# Patient Record
Sex: FEMALE | Race: WHITE | NOT HISPANIC OR LATINO | Employment: OTHER | URBAN - METROPOLITAN AREA
[De-identification: names, ages, dates, MRNs, and addresses within clinical notes are randomized per-mention and may not be internally consistent; named-entity substitution may affect disease eponyms.]

---

## 2017-07-06 RX ORDER — AZELASTINE 1 MG/ML
SPRAY, METERED NASAL
COMMUNITY
Start: 2017-02-17 | End: 2019-04-29 | Stop reason: SDUPTHER

## 2017-07-06 RX ORDER — ASCORBIC ACID 500 MG
TABLET ORAL
COMMUNITY
End: 2019-05-20

## 2017-07-06 RX ORDER — ALBUTEROL SULFATE 90 UG/1
2 AEROSOL, METERED RESPIRATORY (INHALATION)
COMMUNITY
Start: 2017-03-14 | End: 2019-04-29 | Stop reason: SDUPTHER

## 2017-07-06 RX ORDER — MULTIVITAMIN
TABLET ORAL
COMMUNITY
End: 2019-05-20

## 2017-07-06 RX ORDER — ELECTROLYTES/DEXTROSE
SOLUTION, ORAL ORAL
COMMUNITY
End: 2020-01-13

## 2017-07-06 RX ORDER — MONTELUKAST SODIUM 10 MG/1
TABLET ORAL
COMMUNITY
Start: 2017-03-14 | End: 2017-07-12 | Stop reason: SDUPTHER

## 2017-07-07 ENCOUNTER — OFFICE VISIT (OUTPATIENT)
Dept: ALLERGY | Facility: CLINIC | Age: 57
End: 2017-07-07
Payer: COMMERCIAL

## 2017-07-07 VITALS — HEIGHT: 65 IN | WEIGHT: 192 LBS | BODY MASS INDEX: 31.99 KG/M2 | OXYGEN SATURATION: 98 %

## 2017-07-07 DIAGNOSIS — J98.4 SMALL AIRWAYS DISEASE: ICD-10-CM

## 2017-07-07 DIAGNOSIS — J30.1 NON-SEASONAL ALLERGIC RHINITIS DUE TO POLLEN: ICD-10-CM

## 2017-07-07 DIAGNOSIS — R05.3 CHRONIC COUGH: Primary | ICD-10-CM

## 2017-07-07 PROCEDURE — S8110 PEAK EXPIRATORY FLOW RATE (P: HCPCS | Mod: ,,, | Performed by: ALLERGY & IMMUNOLOGY

## 2017-07-07 PROCEDURE — 99213 OFFICE O/P EST LOW 20 MIN: CPT | Mod: ,,, | Performed by: ALLERGY & IMMUNOLOGY

## 2017-07-07 NOTE — PROGRESS NOTES
"Subjective:       Patient ID: Kaia Nelson is a 56 y.o. female.    Chief Complaint: Allergic Rhinitis  (3 month follow up on allergic rhinitis and chronic cough)    HPI     Pt presents from March 28th for chronic cough.   Since her last visit, she has not required her rescue inhaler.  Nocturnal cough: none  Sx: cough has resolved.  Tx: asmanex 2 puffs BID, montelukast 10 mg po daily.     Rhintiis:  Condition: controlled  Sx: none   Tx: fluticasone- 1 SEN BID  Testing: allergic     Review of Systems      General: neg unexpected weight changes, fevers, chills, night sweats, malaise  HEENT: see hpi, Neg eye pain, vision changes, ear drainage, nose bleeds, throat tightness, sores in the mouth  CV: Neg chest pain, palpitations, swelling  Resp: see hpi, neg shortness of breath, hemoptysis, cough  GI: see hpi, neg dysphagia, night abdominal pain, reflux, chronic diarrhea, chronic constipation  Derm: See Hpi, neg new rash, neg flushing  Mu/sk: Neg joint pain, joint swelling   Psych: Neg anxiety  neuro: neg chronic headaches, muscle weakness  Endo: neg heat/cold intolerance, chronic fatigue    Objective:       Vitals:    07/07/17 0936   SpO2: 98%   Weight: 87.1 kg (192 lb)   Height: 5' 5" (1.651 m)       Physical Exam      General: no acute distress, well developed well nourished   HEENT:   Head:normocephalic atraumatic  Eyes: RAYSHAWN, EOMI, Neg injection, scleral icterus, or conjunctival papillary hypertrophy.  Ears: tm clear bilaterally, normal canal  Nose: 2+ inferior turbinates pink, neg nasal polyps            Mucosa: mild dryness            Septal irritation: distal tip on septum.   OP: mucus membranes moist, - cobblestoning, - PND, neg erythema or lesions  Neck: supple, Full range of motion, neg lymphadenopathy  Chest: full respiratory excursion no abnormal chest abnormality  Resp: clear to ascultation bilaterally  CV: RRR, neg MRG, brisk capillary refill  Abdomen: BS+, non tender, non distended.  Ext:  Neg clubbing, " cyanosis, pitting edema  Skin: Neg rashes or lesions  Lymph: neg supraclavicular, axillary     Assessment:     Chronic cough - resolved    Small airway obstruction- controlled  - step down 1 puffs Twice per day.   - after 3 months if still controlled, stop asmanex.  - continue montelukast.     Allergic rhinitis-  - continue fluticasone and titrate to lowest effective dose.   - continue saline  - start vaseline on the septum to help with irritation.     Follow up in 6 months.     Diana Corado M.D.  Allergy/Immunology  [facility]   [phone] phone  [phone] fax

## 2017-07-12 RX ORDER — MONTELUKAST SODIUM 10 MG/1
TABLET ORAL
Qty: 30 TABLET | Refills: 6 | Status: SHIPPED | OUTPATIENT
Start: 2017-07-12 | End: 2019-01-09 | Stop reason: SDUPTHER

## 2018-01-09 ENCOUNTER — OFFICE VISIT (OUTPATIENT)
Dept: ALLERGY | Facility: CLINIC | Age: 58
End: 2018-01-09
Payer: COMMERCIAL

## 2018-01-09 VITALS
DIASTOLIC BLOOD PRESSURE: 78 MMHG | BODY MASS INDEX: 32.26 KG/M2 | WEIGHT: 193.63 LBS | SYSTOLIC BLOOD PRESSURE: 130 MMHG | HEIGHT: 65 IN

## 2018-01-09 DIAGNOSIS — R05.3 CHRONIC COUGH: ICD-10-CM

## 2018-01-09 DIAGNOSIS — J30.89 CHRONIC NONSEASONAL ALLERGIC RHINITIS DUE TO POLLEN: ICD-10-CM

## 2018-01-09 DIAGNOSIS — J98.4 SMALL AIRWAYS DISEASE: Primary | ICD-10-CM

## 2018-01-09 PROCEDURE — 99214 OFFICE O/P EST MOD 30 MIN: CPT | Mod: ,,, | Performed by: ALLERGY & IMMUNOLOGY

## 2018-01-09 NOTE — PATIENT INSTRUCTIONS
Nose:  When having increased symptoms:    Increase flonase to 2 sprays twice per day for 2 weeks.   Then slowly go back to to your current dose.     Azelastine 1 spray twice per day- may use as needed. Max dose is 2 sprays twice per day.     May take montelukast for bronchospasm and congestion in the sinuses.     The inhaler is for inflammation in the lower airways or the lungs. Will take about 2 weeks before fully in effect.     May wean off when better at any time.     December around Christmas, start flonase 1 spray twice per day- if symptoms are getting worse increase. Start azelastine 1 spray twice per day as these two both work better together.  If the lungs are affected start and stop after 3 months.   Increase saline.   Montelukast may take about a week before fully effective. May stop this at any time.

## 2018-01-09 NOTE — PROGRESS NOTES
"Subjective:       Patient ID: Kaia Nelson is a 57 y.o. female.    Chief Complaint: Follow-up (F/U for cough - has gotten better since last visit )    HPI     Pt presents from July of 2017.  Condition: improved  Sx: fluid in the ears.   Tx: she was stepped down from asmanex to 1 puff BID.   We discussed completely stopping asmanex if she was controlled.   She was able to wean herself off the asmanex without issue.   She is also off her montelukast.   She has had some cough in the recent past but now resolved.   She is still taking flonase- 1 sen once daily.  Saline: once per day.   Off azelastine.         Review of Systems      General: neg unexpected weight changes, fevers, chills, night sweats, malaise  HEENT: see hpi, Neg eye pain, vision changes, ear drainage, nose bleeds, throat tightness, sores in the mouth  CV: Neg chest pain, palpitations, swelling  Resp: see hpi, neg shortness of breath, hemoptysis, cough  GI: see hpi, neg dysphagia, night abdominal pain, reflux, chronic diarrhea, chronic constipation  Derm: See Hpi, neg new rash, neg flushing  Mu/sk: Neg joint pain, joint swelling   Psych: Neg anxiety  neuro: neg chronic headaches, muscle weakness  Endo: neg heat/cold intolerance, chronic fatigue    Objective:       Vitals:    01/09/18 0845   BP: 130/78   Weight: 87.8 kg (193 lb 9.6 oz)   Height: 5' 5" (1.651 m)   PF: 390 L/min       Physical Exam      General: no acute distress, well developed well nourished   HEENT:   Head:normocephalic atraumatic  Eyes: RAYSHAWN, EOMI, Neg injection, scleral icterus, or conjunctival papillary hypertrophy.  Ears: tm clear bilaterally, normal canal  Nose: 2-3+ inferior turbinates pink, neg nasal polyps            Mucosa: mild dryness            Septal irritation: left and infr turb  OP: mucus membranes moist, - cobblestoning, - PND, neg erythema or lesions  Neck: supple, Full range of motion, neg lymphadenopathy  Chest: full respiratory excursion no abnormal chest " abnormality  Resp: clear to ascultation bilaterally  CV: RRR, neg MRG, brisk capillary refill  Abdomen: BS+, non tender, non distended  Ext:  Neg clubbing, cyanosis, pitting edema  Skin: Neg rashes or lesions  Lymph: neg supraclavicular, axillary     Assessment:       1. Small airways disease    2. Chronic nonseasonal allergic rhinitis due to pollen    3. Chronic cough        Plan:       Small airways disease    Chronic nonseasonal allergic rhinitis due to pollen    Chronic cough      Pt and I discuss face to face > 35 mins > 50% about prophylactic regimen for January in future regimens in the event of increasing symptoms.        Follow up in 12 months.    Pt to continue saline, for irritation stop flonase for 3 days then resume.   Plan given to patient written. See AVS.             Diana Corado M.D.  Allergy/Immunology  [facility]   [phone] phone  [phone] fax

## 2019-01-09 ENCOUNTER — OFFICE VISIT (OUTPATIENT)
Dept: ALLERGY | Facility: CLINIC | Age: 59
End: 2019-01-09
Payer: COMMERCIAL

## 2019-01-09 VITALS — BODY MASS INDEX: 33.32 KG/M2 | HEART RATE: 82 BPM | OXYGEN SATURATION: 97 % | HEIGHT: 65 IN | WEIGHT: 200 LBS

## 2019-01-09 DIAGNOSIS — J98.4 SMALL AIRWAYS DISEASE: ICD-10-CM

## 2019-01-09 DIAGNOSIS — J30.1 NON-SEASONAL ALLERGIC RHINITIS DUE TO POLLEN: Primary | ICD-10-CM

## 2019-01-09 PROCEDURE — 99213 OFFICE O/P EST LOW 20 MIN: CPT | Mod: ,,, | Performed by: ALLERGY & IMMUNOLOGY

## 2019-01-09 PROCEDURE — 3008F PR BODY MASS INDEX (BMI) DOCUMENTED: ICD-10-PCS | Mod: ,,, | Performed by: ALLERGY & IMMUNOLOGY

## 2019-01-09 PROCEDURE — 3008F BODY MASS INDEX DOCD: CPT | Mod: ,,, | Performed by: ALLERGY & IMMUNOLOGY

## 2019-01-09 PROCEDURE — 99213 PR OFFICE/OUTPT VISIT, EST, LEVL III, 20-29 MIN: ICD-10-PCS | Mod: ,,, | Performed by: ALLERGY & IMMUNOLOGY

## 2019-01-09 RX ORDER — MONTELUKAST SODIUM 10 MG/1
10 TABLET ORAL DAILY
Qty: 30 TABLET | Refills: 6 | Status: SHIPPED | OUTPATIENT
Start: 2019-01-09 | End: 2019-08-04 | Stop reason: SDUPTHER

## 2019-01-09 RX ORDER — FLUTICASONE PROPIONATE 50 MCG
2 SPRAY, SUSPENSION (ML) NASAL 2 TIMES DAILY
COMMUNITY
End: 2021-09-28 | Stop reason: SDUPTHER

## 2019-01-09 NOTE — PROGRESS NOTES
"Subjective:       Patient ID: Kaia Nelson is a 58 y.o. female.    Chief Complaint: Cough    HPI     Pt presents for cough  Condition: improved  Sx: left ear and PND.    Tx: able to be off asmanex. Fluticasone 2 sen bid. Stopped montelukast. - using as needed.    We discussed completely stopping asmanex if she was controlled.   She was able to wean herself off the asmanex without issue.   She is also off her montelukast.   She has had some cough in the recent past but now resolved.   Saline: once per day.   Off azelastine.     No allergy episodes.         Review of Systems      General: neg unexpected weight changes, fevers, chills, night sweats, malaise  HEENT: see hpi, Neg eye pain, vision changes, ear drainage, nose bleeds, throat tightness, sores in the mouth  CV: Neg chest pain, palpitations, swelling  Resp: see hpi, neg shortness of breath, hemoptysis, cough  GI: see hpi, neg dysphagia, night abdominal pain, reflux, chronic diarrhea, chronic constipation  Derm: See Hpi, neg new rash, neg flushing  Mu/sk: Neg joint pain, joint swelling   Psych: Neg anxiety  neuro: neg chronic headaches, muscle weakness  Endo: neg heat/cold intolerance, chronic fatigue    Objective:       Vitals:    01/09/19 0837   Pulse: 82   SpO2: 97%   Weight: 90.7 kg (200 lb)   Height: 5' 5" (1.651 m)       Physical Exam      General: no acute distress, well developed well nourished   HEENT:   Head:normocephalic atraumatic  Eyes: RAYSHAWN, EOMI, Neg injection, scleral icterus, or conjunctival papillary hypertrophy.  Ears: tm clear bilaterally, normal canal  Nose: 2+ inferior turbinates pink, neg nasal polyps            Mucosa: mild dryness            Septal irritation: neg   OP: mucus membranes moist, - cobblestoning, - PND, neg erythema or lesions  Neck: supple, Full range of motion, neg lymphadenopathy  Chest: full respiratory excursion no abnormal chest abnormality  Resp: clear to ascultation bilaterally  CV: RRR, neg MRG, brisk capillary " refill  Abdomen: BS+, non tender, non distended  Ext:  Neg clubbing, cyanosis, pitting edema  Skin: Neg rashes or lesions  Lymph: neg supraclavicular, axillary     Assessment:       1. Non-seasonal allergic rhinitis due to pollen    2. Small airways disease        Plan:       Non-seasonal allergic rhinitis due to pollen  -     montelukast (SINGULAIR) 10 mg tablet; Take 1 tablet (10 mg total) by mouth once daily.  Dispense: 30 tablet; Refill: 6    Small airways disease        Improved   Continue fluticasone at higher dose when sx then back to baseline.   Continue montelukast po qday 10 mg.     F/u 12 months. Sooner if needed.                 Diana Corado M.D.  Allergy/Immunology  [facility]   [phone] phone  [phone] fax

## 2019-04-29 ENCOUNTER — OFFICE VISIT (OUTPATIENT)
Dept: ALLERGY | Facility: CLINIC | Age: 59
End: 2019-04-29
Payer: COMMERCIAL

## 2019-04-29 ENCOUNTER — TELEPHONE (OUTPATIENT)
Dept: ALLERGY | Facility: CLINIC | Age: 59
End: 2019-04-29

## 2019-04-29 VITALS
DIASTOLIC BLOOD PRESSURE: 80 MMHG | BODY MASS INDEX: 33.32 KG/M2 | WEIGHT: 200 LBS | HEIGHT: 65 IN | SYSTOLIC BLOOD PRESSURE: 140 MMHG

## 2019-04-29 DIAGNOSIS — J30.1 NON-SEASONAL ALLERGIC RHINITIS DUE TO POLLEN: ICD-10-CM

## 2019-04-29 DIAGNOSIS — J98.4 SMALL AIRWAYS DISEASE: Primary | ICD-10-CM

## 2019-04-29 PROCEDURE — 99214 OFFICE O/P EST MOD 30 MIN: CPT | Mod: ,,, | Performed by: ALLERGY & IMMUNOLOGY

## 2019-04-29 PROCEDURE — 3008F PR BODY MASS INDEX (BMI) DOCUMENTED: ICD-10-PCS | Mod: ,,, | Performed by: ALLERGY & IMMUNOLOGY

## 2019-04-29 PROCEDURE — 99214 PR OFFICE/OUTPT VISIT, EST, LEVL IV, 30-39 MIN: ICD-10-PCS | Mod: ,,, | Performed by: ALLERGY & IMMUNOLOGY

## 2019-04-29 PROCEDURE — 3008F BODY MASS INDEX DOCD: CPT | Mod: ,,, | Performed by: ALLERGY & IMMUNOLOGY

## 2019-04-29 RX ORDER — ALBUTEROL SULFATE 90 UG/1
2 AEROSOL, METERED RESPIRATORY (INHALATION) EVERY 6 HOURS PRN
Qty: 18 G | Refills: 3 | Status: SHIPPED | OUTPATIENT
Start: 2019-04-29 | End: 2021-09-28 | Stop reason: SDUPTHER

## 2019-04-29 RX ORDER — PREDNISONE 20 MG/1
40 TABLET ORAL DAILY
Qty: 6 TABLET | Refills: 0 | Status: SHIPPED | OUTPATIENT
Start: 2019-04-29 | End: 2019-05-02

## 2019-04-29 RX ORDER — AZELASTINE 1 MG/ML
1 SPRAY, METERED NASAL 2 TIMES DAILY
Qty: 30 ML | Refills: 3 | Status: SHIPPED | OUTPATIENT
Start: 2019-04-29 | End: 2019-08-15 | Stop reason: SDUPTHER

## 2019-04-29 NOTE — PROGRESS NOTES
"Subjective:       Patient ID: Kaia Nelson is a 58 y.o. female.    Chief Complaint: Cough (started last wednesday)    HPI     Pt presents for cough  Condition: exacerbated last Wednesday   Sx: cough dry, congestion   Tx: asmanex 2 bid on Saturday through today.   Rescue inhaler: hasn't used more than Saturday. Didn't help with cough but helped with heaviness.   Fluticasone 2 sen bid. Started montelukast. - using as needed.    Saline: once per day at night.   Off- azelastine       Review of Systems      General: neg unexpected weight changes, fevers, chills, night sweats, malaise  HEENT: see hpi, Neg eye pain, vision changes, ear drainage, nose bleeds, throat tightness, sores in the mouth  CV: Neg chest pain, palpitations, swelling  Resp: see hpi, neg shortness of breath, hemoptysis, cough  GI: see hpi, neg dysphagia, night abdominal pain, reflux, chronic diarrhea, chronic constipation  Derm: See Hpi, neg new rash, neg flushing  Mu/sk: Neg joint pain, joint swelling   Psych: Neg anxiety  neuro: neg chronic headaches, muscle weakness  Endo: neg heat/cold intolerance, chronic fatigue     Objective:       Vitals:    04/29/19 1614   BP: (!) 140/80   Weight: 90.7 kg (200 lb)   Height: 5' 5" (1.651 m)   PF: 300 L/min       Physical Exam      General: no acute distress, well developed well nourished   HEENT:   Head:normocephalic atraumatic  Eyes: RAYSHAWN, EOMI, Neg injection, scleral icterus, or conjunctival papillary hypertrophy.  Ears: tm clear bilaterally, normal canal  Nose: 2+ inferior turbinates pink, neg nasal polyps            Mucosa: moist             Septal irritation: neg   OP: mucus membranes moist, - cobblestoning, - PND, neg erythema or lesions  Neck: supple, Full range of motion, neg lymphadenopathy  Chest: full respiratory excursion no abnormal chest abnormality  Resp: course in all lung fields   CV: RRR, neg MRG, brisk capillary refill  Abdomen: BS+, non tender, non distended  Ext:  Neg clubbing, cyanosis, " pitting edema  Skin: Neg rashes or lesions  Lymph: neg supraclavicular, axillary     Assessment:       1. Small airways disease    2. Non-seasonal allergic rhinitis due to pollen        Plan:       Small airways disease  -     predniSONE (DELTASONE) 20 MG tablet; Take 2 tablets (40 mg total) by mouth once daily. for 3 days  Dispense: 6 tablet; Refill: 0  -     mometasone (ASMANEX HFA) 200 mcg/actuation HFAA; Inhale 2 puffs into the lungs 2 (two) times daily.  Dispense: 13 g; Refill: 3  -     albuterol (PROAIR HFA) 90 mcg/actuation inhaler; Inhale 2 puffs into the lungs every 6 (six) hours as needed for Wheezing or Shortness of Breath (COUGH).  Dispense: 18 g; Refill: 3    Non-seasonal allergic rhinitis due to pollen  -     azelastine (ASTELIN) 137 mcg (0.1 %) nasal spray; 1 spray (137 mcg total) by Nasal route 2 (two) times daily.  Dispense: 30 mL; Refill: 3          Small airway exacerbated    F/u 4-6 weeks/if better 3 months. Sooner if needed.                 Diana Corado M.D.  Allergy/Immunology  [facility]   [phone] phone  [phone] fax

## 2019-04-29 NOTE — TELEPHONE ENCOUNTER
----- Message from Vanessa Colón sent at 4/29/2019  9:18 AM CDT -----  Contact: patient  Patient is asking for predisone for her cough. Says what she is taking is not woring that well.   Patient uses [facility] on [address]

## 2019-04-29 NOTE — PATIENT INSTRUCTIONS
Nose:  Saline increase to twice per day   Continue fluticasone 2 sprays per nare twice per day for the next two weeks   Find the azelastine and use as needed up to 4 times per day   Continue montelukast 1 pill once per day     Take allegra as needed for any symptoms.     Lung:  Pro air 4-6 puffs every 6 hours for the next 3 days.   Then wean every 3 days.     asmanex 4 puffs twice per day for the next two weeks     Prednisone 40 mg x 3 days. Take in the morning with food.     Follow up if needed in 4 weeks if better 3 months.     Once better use the asmanex 2 puffs twice per day for three months then wean back off.

## 2019-04-29 NOTE — TELEPHONE ENCOUNTER
Discussed with Dr. Corado and advised for patient to have a visit.  Appt booked for today at 3:40

## 2019-04-30 ENCOUNTER — PATIENT MESSAGE (OUTPATIENT)
Dept: ALLERGY | Facility: CLINIC | Age: 59
End: 2019-04-30

## 2019-05-06 DIAGNOSIS — J98.4 SMALL AIRWAYS DISEASE: Primary | ICD-10-CM

## 2019-05-06 RX ORDER — FLUTICASONE PROPIONATE 220 UG/1
2 AEROSOL, METERED RESPIRATORY (INHALATION) 2 TIMES DAILY
Qty: 12 G | Refills: 2 | Status: SHIPPED | OUTPATIENT
Start: 2019-05-06 | End: 2019-06-18

## 2019-05-06 NOTE — TELEPHONE ENCOUNTER
Insurance is refusing to approve Asmanex due to no failures of Flovent, Qvar, or Arnuity.  I spoke with the patient and she does not feel like she can fully take a deep enough breath to pull the qvar into her lungs.  Per Dr. Corado: Flovent 220 2 puffs bid sent to [facility].  Patient states understanding.

## 2019-05-20 ENCOUNTER — OFFICE VISIT (OUTPATIENT)
Dept: ALLERGY | Facility: CLINIC | Age: 59
End: 2019-05-20
Payer: COMMERCIAL

## 2019-05-20 VITALS
WEIGHT: 197 LBS | TEMPERATURE: 99 F | BODY MASS INDEX: 32.78 KG/M2 | RESPIRATION RATE: 18 BRPM | HEART RATE: 81 BPM | SYSTOLIC BLOOD PRESSURE: 128 MMHG | DIASTOLIC BLOOD PRESSURE: 72 MMHG | OXYGEN SATURATION: 98 %

## 2019-05-20 DIAGNOSIS — J98.4 SMALL AIRWAYS DISEASE: ICD-10-CM

## 2019-05-20 DIAGNOSIS — R05.3 CHRONIC COUGH: Primary | ICD-10-CM

## 2019-05-20 PROCEDURE — 99214 OFFICE O/P EST MOD 30 MIN: CPT | Mod: ,,, | Performed by: ALLERGY & IMMUNOLOGY

## 2019-05-20 PROCEDURE — 3008F PR BODY MASS INDEX (BMI) DOCUMENTED: ICD-10-PCS | Mod: ,,, | Performed by: ALLERGY & IMMUNOLOGY

## 2019-05-20 PROCEDURE — 99214 PR OFFICE/OUTPT VISIT, EST, LEVL IV, 30-39 MIN: ICD-10-PCS | Mod: ,,, | Performed by: ALLERGY & IMMUNOLOGY

## 2019-05-20 PROCEDURE — 3008F BODY MASS INDEX DOCD: CPT | Mod: ,,, | Performed by: ALLERGY & IMMUNOLOGY

## 2019-05-20 RX ORDER — AZITHROMYCIN 250 MG/1
TABLET, FILM COATED ORAL
Qty: 11 TABLET | Refills: 0 | Status: SHIPPED | OUTPATIENT
Start: 2019-05-20 | End: 2020-01-09

## 2019-05-20 RX ORDER — CHOLECALCIFEROL (VITAMIN D3) 25 MCG
2000 TABLET ORAL DAILY
COMMUNITY

## 2019-05-20 RX ORDER — MINOXIDIL 10 MG/1
2.5 TABLET ORAL DAILY
COMMUNITY
End: 2023-04-30

## 2019-05-20 RX ORDER — FINASTERIDE 1 MG/1
1 TABLET, FILM COATED ORAL DAILY
COMMUNITY
End: 2021-04-07

## 2019-05-20 NOTE — PROGRESS NOTES
Subjective:       Patient ID: Kaia Nelson is a 58 y.o. female.    Chief Complaint: Cough (worse than before)    HPI     Pt presents for cough  Condition: exacerbated- worse after getting better started on Friday- 4 days ago.   1 week after ICS BID getting better.   After prednisone, no change until a week later when on the inhaler.   She had mood changes on the prednisone.   Sx: cough dry, congestion   Tx: asmanex 2 bid was changed to flovent 220 2 puffs BID.   Rescue inhaler: has not helped,.   Fluticasone 2 sen bid. Started montelukast  Saline: once per day at night.   1 sen  BID- azelastine         General: neg unexpected weight changes, fevers, chills, night sweats, malaise  HEENT: see hpi, Neg eye pain, vision changes, ear drainage, nose bleeds, throat tightness, sores in the mouth  CV: Neg chest pain, palpitations, swelling  Resp: see hpi, neg shortness of breath, hemoptysis, cough  GI: see hpi, neg dysphagia, night abdominal pain, reflux, chronic diarrhea, chronic constipation  Derm: See Hpi, neg new rash, neg flushing  Mu/sk: Neg joint pain, joint swelling   Psych: Neg anxiety  neuro: neg chronic headaches, muscle weakness  Endo: neg heat/cold intolerance, chronic fatigue     Objective:       Vitals:    05/20/19 1129   BP: 128/72   Pulse: 81   Resp: 18   Temp: 98.9 °F (37.2 °C)   TempSrc: Oral   SpO2: 98%   Weight: 89.4 kg (197 lb)   PF: 300 L/min       Physical Exam      General: no acute distress, well developed well nourished   HEENT:   Head:normocephalic atraumatic  Eyes: RAYSHAWN, EOMI, Neg injection, scleral icterus, or conjunctival papillary hypertrophy.  Ears: tm clear bilaterally, normal canal  Nose: 2+ inferior turbinates pink, neg nasal polyps            Mucosa: dry on the left             Septal irritation: left    OP: mucus membranes moist, - cobblestoning, - PND, neg erythema or lesions  Neck: supple, Full range of motion, neg lymphadenopathy  Chest: full respiratory excursion no abnormal chest  abnormality  Resp: course in all lung fields   CV: RRR, neg MRG, brisk capillary refill  Abdomen: BS+, non tender, non distended  Ext:  Neg clubbing, cyanosis, pitting edema  Skin: Neg rashes or lesions  Lymph: neg supraclavicular, axillary     Assessment:       1. Chronic cough    2. Small airways disease        Plan:       Chronic cough  -     azithromycin (Z-ANA) 250 MG tablet; Day 1 take 2 pills then  Dispense: 11 tablet; Refill: 0  -     X-Ray Chest PA And Lateral; Future; Expected date: 05/20/2019  -     (Magic mouthwash) 1:1:1 Benadryl 12.5mg/5ml liq, aluminum & magnesium hydroxide-simehticone (Maalox), lidocaine viscous 2%; Swish and spit 3 mLs every 4 (four) hours as needed (cough). for mouth sores  Dispense: 300 mL; Refill: 3    Small airways disease  -     azithromycin (Z-ANA) 250 MG tablet; Day 1 take 2 pills then  Dispense: 11 tablet; Refill: 0  -     X-Ray Chest PA And Lateral; Future; Expected date: 05/20/2019          Small airway exacerbated  Worry about double illness and atypical.   10 day course azithro.   Not improved on steroids.   Continue flovent 2 puffs bid  Start magic mouthwash prn q 3-6 hours.   Chest radiograph.     Sample of symbicort 80 2 puff BID x1 week, then 1 BID    F/u 4-6 weeks/if better 3 months. Sooner if needed.                 Diana Corado M.D.  Allergy/Immunology  [facility]   [phone] phone  [phone] fax

## 2019-05-20 NOTE — PATIENT INSTRUCTIONS
Cough:  symbicort 80 1 puff twice per day, or if you want 2 puffs twice per day x 1 week then 1 puff twice per day.     Magic mouth wash 3-5 mL as needed every 3-6 hours     Chest xray to evaluate for atypical infection.     Continue flovent 2 puffs twice per day    Continue montelukast 1 pill once per day     Continue to wean albuterol.     10 day course of azithromycin   2 pills day 1 then 1 pill once per day , day 2-10.      600 mg motrin as needed with food every 6 hours.

## 2019-05-22 ENCOUNTER — PATIENT MESSAGE (OUTPATIENT)
Dept: ALLERGY | Facility: CLINIC | Age: 59
End: 2019-05-22

## 2019-06-05 ENCOUNTER — OFFICE VISIT (OUTPATIENT)
Dept: ALLERGY | Facility: CLINIC | Age: 59
End: 2019-06-05
Payer: COMMERCIAL

## 2019-06-05 ENCOUNTER — TELEPHONE (OUTPATIENT)
Dept: ALLERGY | Facility: CLINIC | Age: 59
End: 2019-06-05

## 2019-06-05 VITALS
BODY MASS INDEX: 32.15 KG/M2 | SYSTOLIC BLOOD PRESSURE: 118 MMHG | TEMPERATURE: 99 F | DIASTOLIC BLOOD PRESSURE: 78 MMHG | HEIGHT: 65 IN | WEIGHT: 193 LBS

## 2019-06-05 DIAGNOSIS — J30.1 NON-SEASONAL ALLERGIC RHINITIS DUE TO POLLEN: ICD-10-CM

## 2019-06-05 DIAGNOSIS — R68.83 CHILLS WITHOUT FEVER: ICD-10-CM

## 2019-06-05 DIAGNOSIS — B35.4 TINEA CORPORIS: ICD-10-CM

## 2019-06-05 DIAGNOSIS — R53.82 CHRONIC FATIGUE: ICD-10-CM

## 2019-06-05 DIAGNOSIS — J98.4 SMALL AIRWAYS DISEASE: Primary | ICD-10-CM

## 2019-06-05 DIAGNOSIS — J32.0 LEFT MAXILLARY SINUSITIS: ICD-10-CM

## 2019-06-05 DIAGNOSIS — R05.3 CHRONIC COUGH: ICD-10-CM

## 2019-06-05 PROCEDURE — 3008F PR BODY MASS INDEX (BMI) DOCUMENTED: ICD-10-PCS | Mod: ,,, | Performed by: ALLERGY & IMMUNOLOGY

## 2019-06-05 PROCEDURE — 99214 PR OFFICE/OUTPT VISIT, EST, LEVL IV, 30-39 MIN: ICD-10-PCS | Mod: ,,, | Performed by: ALLERGY & IMMUNOLOGY

## 2019-06-05 PROCEDURE — 99214 OFFICE O/P EST MOD 30 MIN: CPT | Mod: ,,, | Performed by: ALLERGY & IMMUNOLOGY

## 2019-06-05 PROCEDURE — 3008F BODY MASS INDEX DOCD: CPT | Mod: ,,, | Performed by: ALLERGY & IMMUNOLOGY

## 2019-06-05 RX ORDER — OSELTAMIVIR PHOSPHATE 75 MG/1
75 CAPSULE ORAL 2 TIMES DAILY
Qty: 10 CAPSULE | Refills: 0 | Status: SHIPPED | OUTPATIENT
Start: 2019-06-05 | End: 2019-06-10

## 2019-06-05 RX ORDER — CLOTRIMAZOLE 1 %
CREAM (GRAM) TOPICAL
Qty: 45 G | Refills: 3 | Status: SHIPPED | OUTPATIENT
Start: 2019-06-05 | End: 2020-02-24

## 2019-06-05 RX ORDER — AMOXICILLIN AND CLAVULANATE POTASSIUM 875; 125 MG/1; MG/1
1 TABLET, FILM COATED ORAL EVERY 12 HOURS
Qty: 28 TABLET | Refills: 0 | Status: SHIPPED | OUTPATIENT
Start: 2019-06-05 | End: 2019-06-19

## 2019-06-05 RX ORDER — PREDNISONE 10 MG/1
10 TABLET ORAL DAILY
Qty: 5 TABLET | Refills: 0 | Status: SHIPPED | OUTPATIENT
Start: 2019-06-05 | End: 2019-06-10

## 2019-06-05 NOTE — TELEPHONE ENCOUNTER
called stating that Mrs. Marti isn't feeling better besides a slight improvement in her cough. She completed her Z-Genaro but is still having severe sinus pressure, low grade fever, and fatigue. Mr. Hammond states that he required an urgent care visit where they diagnosed him with walking pneumonia and took antibiotics. He is concerned that she may need additional antibiotics. Please advise. Thanks.

## 2019-06-05 NOTE — PROGRESS NOTES
"Subjective:       Patient ID: Kaia Nelson is a 58 y.o. female.    Chief Complaint: Sinus Problem (pain in left side of face and neck)      Pt presents for cough  Condition: exacerbated- worse after getting better started on Friday- 4 days ago.   1 week after ICS BID getting better.   After prednisone, no change until a week later when on the inhaler.   She had mood changes on the prednisone.   She was given azithromycin 10 day course and chest xray showed streakiness and concern for atypical mycoplasma and was tx x 10 days.   Now the cough may be slightly better but she is still symptomatic.   Sx: cough dry, congestion   Tx: asmanex 2 bid was changed to flovent 220 2 puffs BID. symbicort sample 80 2 puffs bid given.   Rescue inhaler: has not helped per report.   Fluticasone 2 sen bid, montelukast 10 mg po qday   Started magic mouth wash   Saline: once per day at night.   1 sen  BID- azelastine         General: neg unexpected weight changes, fevers, chills, night sweats, malaise  HEENT: see hpi, Neg eye pain, vision changes, ear drainage, nose bleeds, throat tightness, sores in the mouth  CV: Neg chest pain, palpitations, swelling  Resp: see hpi, neg shortness of breath, hemoptysis, cough  GI: see hpi, neg dysphagia, night abdominal pain, reflux, chronic diarrhea, chronic constipation  Derm: See Hpi, neg new rash, neg flushing  Mu/sk: Neg joint pain, joint swelling   Psych: Neg anxiety  neuro: neg chronic headaches, muscle weakness  Endo: neg heat/cold intolerance, chronic fatigue     Objective:       Vitals:    06/05/19 1419   BP: 118/78   Temp: 98.6 °F (37 °C)   Weight: 87.5 kg (193 lb)   Height: 5' 5" (1.651 m)   PF: 450 L/min       Physical Exam      General: no acute distress, well developed well nourished , pallor of the face    HEENT:   Head:normocephalic atraumatic, left sided facial swelling and tenderness in maxillary area with neck swelling.   Eyes: RAYSHAWN, EOMI, Neg injection, scleral icterus, or " conjunctival papillary hypertrophy.  Neck: supple, Full range of motion, neg lymphadenopathy, left swelling.   Skin: Neg rashes or lesions  Lymph: neg supraclavicular, axillary     Assessment:       1. Small airways disease    2. Chronic cough    3. Non-seasonal allergic rhinitis due to pollen    4. Left maxillary sinusitis    5. Chills without fever    6. Chronic fatigue    7. Tinea corporis        Plan:       Small airways disease    Chronic cough    Non-seasonal allergic rhinitis due to pollen    Left maxillary sinusitis  -     amoxicillin-clavulanate 875-125mg (AUGMENTIN) 875-125 mg per tablet; Take 1 tablet by mouth every 12 (twelve) hours. Take with food. for 14 days  Dispense: 28 tablet; Refill: 0  -     predniSONE (DELTASONE) 10 MG tablet; Take 1 tablet (10 mg total) by mouth once daily. for 5 days  Dispense: 5 tablet; Refill: 0    Chills without fever  -     oseltamivir (TAMIFLU) 75 MG capsule; Take 1 capsule (75 mg total) by mouth 2 (two) times daily. for 5 days  Dispense: 10 capsule; Refill: 0    Chronic fatigue  -     oseltamivir (TAMIFLU) 75 MG capsule; Take 1 capsule (75 mg total) by mouth 2 (two) times daily. for 5 days  Dispense: 10 capsule; Refill: 0    Tinea corporis  -     clotrimazole (LOTRIMIN) 1 % cream; Apply topically three times daily with food. Until gone. for 3 doses  Dispense: 45 g; Refill: 3          Small airway exacerbated  Worried about double illness and atypical infection and s/p 10 day course of azithro.   Not improved on steroids.   Continue flovent 2 puffs bid, symbicort 80 2 puffs bid did not help.   magic mouthwash prn q 3-6 hours.   Chest radiograph showed streakiness and tx for atypical.     Face is significantly swollen on the left- augmentin 875 mg BID, due to chills and fever feeling and how pale she is today will treat for flu like symptoms.         F/u 4-6 weeks/if better 3 months. Sooner if needed.                 Diana Corado M.D.  Allergy/Immunology  [place]   [phone] phone  [phone] fax

## 2019-06-05 NOTE — PATIENT INSTRUCTIONS
Nose:  Continue as normal    Prednisone 10 mg take with food 1 pill in the morning only for 5 days. This is to help with energy and inflammation.     Augumentin 875 mg 1 pill twice per day take with food, may cause diarrhea. Take until whole bottle is done.    Because of your symptoms, I am starting tamiflu 1 pill twice per day.     Only for 5 days.       If things get worse call. We can order imaging.     Continue montelukast    Albuterol as needed for cough wheeze shortness of breath.   Finish the symbicort   Once that is done, then we can just use flovent.     Stay home from work :o) please :o)

## 2019-06-18 DIAGNOSIS — R05.3 CHRONIC COUGH: Primary | ICD-10-CM

## 2019-08-01 RX ORDER — FLUTICASONE PROPIONATE 220 UG/1
AEROSOL, METERED RESPIRATORY (INHALATION)
Qty: 36 G | Refills: 1 | Status: SHIPPED | OUTPATIENT
Start: 2019-08-01 | End: 2019-12-30 | Stop reason: SDUPTHER

## 2019-08-04 DIAGNOSIS — J30.1 NON-SEASONAL ALLERGIC RHINITIS DUE TO POLLEN: ICD-10-CM

## 2019-08-05 RX ORDER — MONTELUKAST SODIUM 10 MG/1
TABLET ORAL
Qty: 90 TABLET | Refills: 2 | Status: SHIPPED | OUTPATIENT
Start: 2019-08-05 | End: 2020-04-21

## 2019-08-15 DIAGNOSIS — J30.1 NON-SEASONAL ALLERGIC RHINITIS DUE TO POLLEN: ICD-10-CM

## 2019-08-15 RX ORDER — AZELASTINE 1 MG/ML
SPRAY, METERED NASAL
Qty: 90 ML | Refills: 3 | Status: SHIPPED | OUTPATIENT
Start: 2019-08-15 | End: 2021-06-24

## 2019-12-30 ENCOUNTER — OFFICE VISIT (OUTPATIENT)
Dept: ALLERGY | Facility: CLINIC | Age: 59
End: 2019-12-30
Payer: COMMERCIAL

## 2019-12-30 VITALS
WEIGHT: 201 LBS | DIASTOLIC BLOOD PRESSURE: 84 MMHG | HEART RATE: 88 BPM | SYSTOLIC BLOOD PRESSURE: 130 MMHG | BODY MASS INDEX: 32.3 KG/M2 | HEIGHT: 66 IN | OXYGEN SATURATION: 99 %

## 2019-12-30 DIAGNOSIS — B37.0 THRUSH: ICD-10-CM

## 2019-12-30 DIAGNOSIS — J98.4 SMALL AIRWAYS DISEASE: Primary | ICD-10-CM

## 2019-12-30 PROCEDURE — 99214 OFFICE O/P EST MOD 30 MIN: CPT | Mod: S$GLB,,, | Performed by: ALLERGY & IMMUNOLOGY

## 2019-12-30 PROCEDURE — 3008F PR BODY MASS INDEX (BMI) DOCUMENTED: ICD-10-PCS | Mod: S$GLB,,, | Performed by: ALLERGY & IMMUNOLOGY

## 2019-12-30 PROCEDURE — 99214 PR OFFICE/OUTPT VISIT, EST, LEVL IV, 30-39 MIN: ICD-10-PCS | Mod: S$GLB,,, | Performed by: ALLERGY & IMMUNOLOGY

## 2019-12-30 PROCEDURE — 3008F BODY MASS INDEX DOCD: CPT | Mod: S$GLB,,, | Performed by: ALLERGY & IMMUNOLOGY

## 2019-12-30 RX ORDER — FLUTICASONE PROPIONATE 220 UG/1
AEROSOL, METERED RESPIRATORY (INHALATION)
Qty: 36 G | Refills: 2 | Status: SHIPPED | OUTPATIENT
Start: 2019-12-30 | End: 2021-03-08

## 2019-12-30 RX ORDER — NYSTATIN 100000 [USP'U]/ML
6 SUSPENSION ORAL 4 TIMES DAILY
Qty: 240 ML | Refills: 1 | Status: SHIPPED | OUTPATIENT
Start: 2019-12-30 | End: 2020-01-09

## 2019-12-30 NOTE — PATIENT INSTRUCTIONS
Nose:  Continue as current     Lung:   Increase flovent to 4 puffs twice per day x 2 weeks then back down to twice per day.     Use with aerochamber. Wash aerochamber after use.     Use nystatin swish and spit or swallow four times per day x 10 days to get rid of potential thrush in the back.     Will try to get spiriva approved 2 puffs daily. May use as needed.     Follow up in 3 months, sooner if needed.

## 2019-12-30 NOTE — PROGRESS NOTES
"Subjective:       Patient ID: Kaia Nelson is a 59 y.o. female.    Chief Complaint: Allergic Rhinitis  (6 month follow up-the hoarseness having now is constant)      Pt presents for cough    augmentin- 6/2019   Steroids 6/2019  Condition: improved, but still coughing.   After prednisone, no change until a week later when on the inhaler.   She had mood changes on the prednisone.   She was given azithromycin 10 day course and chest xray showed streakiness and concern for atypical mycoplasma and was tx x 10 days.   Now the cough may be slightly better but she is still symptomatic.   Sx: cough dry, congestion  Tx: asmanex 2 bid was changed to flovent 220 2 puffs BID. symbicort sample 80 2 puffs bid given. Not much difference with the symbicort 2 puffs BID 80 mcg.   Rescue inhaler: has not helped per report in the past.   briana frequ: not had to use.   Fluticasone 2 sen bid, montelukast 10 mg po qday   magic mouth wash - didn't help.   Saline: once per day at night.   1 sen  BID- azelastine         General: neg unexpected weight changes, fevers, chills, night sweats, malaise  HEENT: see hpi, Neg eye pain, vision changes, ear drainage, nose bleeds, throat tightness, sores in the mouth  CV: Neg chest pain, palpitations, swelling  Resp: see hpi, neg shortness of breath, hemoptysis  GI: see hpi, neg dysphagia, night abdominal pain, reflux, chronic diarrhea, chronic constipation  Derm: See Hpi, neg new rash, neg flushing  Mu/sk: Neg joint pain, joint swelling   Psych: Neg anxiety  neuro: neg chronic headaches, muscle weakness  Endo: neg heat/cold intolerance, chronic fatigue     Objective:       Vitals:    12/30/19 1010   BP: 130/84   Pulse: 88   SpO2: 99%   Weight: 91.2 kg (201 lb)   Height: 5' 6" (1.676 m)   PF: 290 L/min       Physical Exam      General: no acute distress, well developed well nourished   HEENT:   Head:normocephalic atraumatic  Eyes: RAYSHAWN, EOMI, Neg injection, scleral icterus, or conjunctival papillary " hypertrophy.  Ears: tm clear bilaterally, normal canal  Nose:2+ inferior turbinates pink, neg nasal polyps            Mucosa: moist            Septal irritation: none   OP: mucus membranes moist, - cobblestoning, - PND, + erythema on the pharyngeal palate and white spot.   Neck: supple, Full range of motion, neg lymphadenopathy  Chest: full respiratory excursion no abnormal chest abnormality  Resp: clear to ascultation bilaterally  CV: RRR, neg MRG, brisk capillary refill  Abdomen: BS+, non tender, non distended, neg hepatosplenomegaly.   Ext:  Neg clubbing, cyanosis, pitting edema  Skin: Neg rashes or lesions  Lymph: neg supraclavicular, axillary       Assessment:       1. Small airways disease    2. Thrush        Plan:       Small airways disease  -     inhalation spacing device (AEROCHAMBER PLUS Z STAT); Use as directed for inhalation.  Dispense: 1 Device; Refill: 3  -     fluticasone propionate (FLOVENT HFA) 220 mcg/actuation inhaler; INHALE 2 PUFFS INTO THE LUNGS 2 (TWO) TIMES DAILY. CONTROLLER  Dispense: 36 g; Refill: 2  -     tiotropium bromide (SPIRIVA RESPIMAT) 1.25 mcg/actuation Mist; Inhale 2 puffs into the lungs once daily. Controller  Dispense: 12 g; Refill: 3    Thrush  -     nystatin (MYCOSTATIN) 100,000 unit/mL suspension; Take 6 mLs (600,000 Units total) by mouth 4 (four) times daily. for 10 days  Dispense: 240 mL; Refill: 1          Small airway exacerbated  Worried about double illness and atypical infection and s/p 10 day course of azithro.   Not improved on steroids.   Continue flovent 2 puffs bid- for now peak flow down, increase to 4 puffs bid x 2 weeks then back down, symbicort 80 2 puffs bid did not help.   magic mouthwash prn q 3-6 hours. Not helpful.   Chest radiograph showed streakiness and tx for atypical. 6/2019 6/2019- Face was significantly swollen on the left- augmentin 875 mg BID, due to chills and fever feeling and how pale she is today will treat for flu like symptoms - 6/2019.      Follow up in 3 months, sooner if needed.                     Diana Corado M.D.  Allergy/Immunology  [facility]   [phone] phone  [phone] fax

## 2019-12-31 ENCOUNTER — HOSPITAL ENCOUNTER (EMERGENCY)
Facility: HOSPITAL | Age: 59
Discharge: HOME | End: 2019-12-31
Attending: EMERGENCY MEDICINE
Payer: COMMERCIAL

## 2019-12-31 VITALS
RESPIRATION RATE: 20 BRPM | HEART RATE: 100 BPM | BODY MASS INDEX: 32.82 KG/M2 | OXYGEN SATURATION: 96 % | SYSTOLIC BLOOD PRESSURE: 141 MMHG | TEMPERATURE: 99 F | DIASTOLIC BLOOD PRESSURE: 75 MMHG | HEIGHT: 65 IN | WEIGHT: 197 LBS

## 2019-12-31 DIAGNOSIS — R19.7 VOMITING AND DIARRHEA: Primary | ICD-10-CM

## 2019-12-31 DIAGNOSIS — J02.9 SORE THROAT: ICD-10-CM

## 2019-12-31 DIAGNOSIS — R11.10 VOMITING AND DIARRHEA: Primary | ICD-10-CM

## 2019-12-31 LAB
ALBUMIN SERPL BCP-MCNC: 4.2 G/DL (ref 3.5–5.2)
ALP SERPL-CCNC: 50 U/L (ref 55–135)
ALT SERPL W/O P-5'-P-CCNC: 17 U/L (ref 10–44)
ANION GAP SERPL CALC-SCNC: 13 MMOL/L (ref 8–16)
AST SERPL-CCNC: 19 U/L (ref 10–40)
BASOPHILS # BLD AUTO: 0.04 K/UL (ref 0–0.2)
BASOPHILS NFR BLD: 0.3 % (ref 0–1.9)
BILIRUB SERPL-MCNC: 0.7 MG/DL (ref 0.1–1)
BNP SERPL-MCNC: 21 PG/ML (ref 0–99)
BUN SERPL-MCNC: 16 MG/DL (ref 6–20)
CALCIUM SERPL-MCNC: 8.6 MG/DL (ref 8.7–10.5)
CHLORIDE SERPL-SCNC: 101 MMOL/L (ref 95–110)
CO2 SERPL-SCNC: 24 MMOL/L (ref 23–29)
CREAT SERPL-MCNC: 0.8 MG/DL (ref 0.5–1.4)
DEPRECATED S PYO AG THROAT QL EIA: NEGATIVE
DIFFERENTIAL METHOD: ABNORMAL
EOSINOPHIL # BLD AUTO: 0.1 K/UL (ref 0–0.5)
EOSINOPHIL NFR BLD: 1 % (ref 0–8)
ERYTHROCYTE [DISTWIDTH] IN BLOOD BY AUTOMATED COUNT: 13.5 % (ref 11.5–14.5)
EST. GFR  (AFRICAN AMERICAN): >60 ML/MIN/1.73 M^2
EST. GFR  (NON AFRICAN AMERICAN): >60 ML/MIN/1.73 M^2
GLUCOSE SERPL-MCNC: 143 MG/DL (ref 70–110)
HCT VFR BLD AUTO: 39.4 % (ref 37–48.5)
HGB BLD-MCNC: 12.7 G/DL (ref 12–16)
IMM GRANULOCYTES # BLD AUTO: 0.05 K/UL (ref 0–0.04)
IMM GRANULOCYTES NFR BLD AUTO: 0.4 % (ref 0–0.5)
INFLUENZA A, MOLECULAR: NEGATIVE
INFLUENZA B, MOLECULAR: NEGATIVE
LIPASE SERPL-CCNC: 23 U/L (ref 4–60)
LYMPHOCYTES # BLD AUTO: 0.5 K/UL (ref 1–4.8)
LYMPHOCYTES NFR BLD: 4.1 % (ref 18–48)
MCH RBC QN AUTO: 27.5 PG (ref 27–31)
MCHC RBC AUTO-ENTMCNC: 32.2 G/DL (ref 32–36)
MCV RBC AUTO: 85 FL (ref 82–98)
MONOCYTES # BLD AUTO: 0.4 K/UL (ref 0.3–1)
MONOCYTES NFR BLD: 3.6 % (ref 4–15)
NEUTROPHILS # BLD AUTO: 11 K/UL (ref 1.8–7.7)
NEUTROPHILS NFR BLD: 90.6 % (ref 38–73)
NRBC BLD-RTO: 0 /100 WBC
PLATELET # BLD AUTO: 252 K/UL (ref 150–350)
PMV BLD AUTO: 9.4 FL (ref 9.2–12.9)
POTASSIUM SERPL-SCNC: 3.9 MMOL/L (ref 3.5–5.1)
PROT SERPL-MCNC: 7.3 G/DL (ref 6–8.4)
RBC # BLD AUTO: 4.62 M/UL (ref 4–5.4)
SODIUM SERPL-SCNC: 138 MMOL/L (ref 136–145)
SPECIMEN SOURCE: NORMAL
WBC # BLD AUTO: 12.09 K/UL (ref 3.9–12.7)

## 2019-12-31 PROCEDURE — 80053 COMPREHEN METABOLIC PANEL: CPT

## 2019-12-31 PROCEDURE — 87081 CULTURE SCREEN ONLY: CPT

## 2019-12-31 PROCEDURE — 63600175 PHARM REV CODE 636 W HCPCS: Performed by: EMERGENCY MEDICINE

## 2019-12-31 PROCEDURE — 36415 COLL VENOUS BLD VENIPUNCTURE: CPT

## 2019-12-31 PROCEDURE — 87880 STREP A ASSAY W/OPTIC: CPT

## 2019-12-31 PROCEDURE — 87502 INFLUENZA DNA AMP PROBE: CPT

## 2019-12-31 PROCEDURE — 96374 THER/PROPH/DIAG INJ IV PUSH: CPT

## 2019-12-31 PROCEDURE — 96361 HYDRATE IV INFUSION ADD-ON: CPT

## 2019-12-31 PROCEDURE — 83690 ASSAY OF LIPASE: CPT

## 2019-12-31 PROCEDURE — 99284 EMERGENCY DEPT VISIT MOD MDM: CPT | Mod: 25

## 2019-12-31 PROCEDURE — 85025 COMPLETE CBC W/AUTO DIFF WBC: CPT

## 2019-12-31 PROCEDURE — 83880 ASSAY OF NATRIURETIC PEPTIDE: CPT

## 2019-12-31 RX ORDER — ONDANSETRON 4 MG/1
4 TABLET, ORALLY DISINTEGRATING ORAL EVERY 8 HOURS PRN
Qty: 12 TABLET | Refills: 0 | Status: SHIPPED | OUTPATIENT
Start: 2019-12-31 | End: 2020-02-24

## 2019-12-31 RX ORDER — ONDANSETRON 2 MG/ML
4 INJECTION INTRAMUSCULAR; INTRAVENOUS
Status: COMPLETED | OUTPATIENT
Start: 2019-12-31 | End: 2019-12-31

## 2019-12-31 RX ADMIN — ONDANSETRON 4 MG: 2 INJECTION INTRAMUSCULAR; INTRAVENOUS at 05:12

## 2019-12-31 RX ADMIN — SODIUM CHLORIDE 1000 ML: 0.9 INJECTION, SOLUTION INTRAVENOUS at 05:12

## 2019-12-31 NOTE — ED PROVIDER NOTES
Encounter Date: 12/31/2019       History     Chief Complaint   Patient presents with    Emesis     HPI  Review of patient's allergies indicates:   Allergen Reactions    Benzonatate Other (See Comments)     dizziness     Past Medical History:   Diagnosis Date    Acute allergic rhinitis due to pollen     Allergy to dust     Chronic cough     Chronic rhinitis     Other pulmonary insufficiency, not elsewhere classified      No past surgical history on file.  Family History   Problem Relation Age of Onset    Asthma Mother     Emphysema Mother     Hypertension Mother     Diabetes Father     Kidney disease Father     Heart failure Maternal Grandmother     Hypertension Maternal Grandmother     Diabetes Maternal Grandfather     Heart failure Maternal Grandfather     Cancer Paternal Grandmother         ovarian     Social History     Tobacco Use    Smoking status: Never Smoker    Smokeless tobacco: Never Used   Substance Use Topics    Alcohol use: No    Drug use: No     Review of Systems    Physical Exam     Initial Vitals [12/31/19 0424]   BP Pulse Resp Temp SpO2   (!) 158/76 100 20 98.5 °F (36.9 °C) 98 %      MAP       --         Physical Exam    ED Course   Procedures  Labs Reviewed   CBC W/ AUTO DIFFERENTIAL - Abnormal; Notable for the following components:       Result Value    Gran # (ANC) 11.0 (*)     Immature Grans (Abs) 0.05 (*)     Lymph # 0.5 (*)     Gran% 90.6 (*)     Lymph% 4.1 (*)     Mono% 3.6 (*)     All other components within normal limits   COMPREHENSIVE METABOLIC PANEL - Abnormal; Notable for the following components:    Glucose 143 (*)     Calcium 8.6 (*)     Alkaline Phosphatase 50 (*)     All other components within normal limits   THROAT SCREEN, RAPID   CULTURE, STREP A,  THROAT   B-TYPE NATRIURETIC PEPTIDE   LIPASE   INFLUENZA A AND B ANTIGEN    Narrative:     Specimen Source->Nasopharyngeal Swab   COMPREHENSIVE METABOLIC PANEL          Imaging Results    None                                           Clinical Impression:       ICD-10-CM ICD-9-CM   1. Vomiting and diarrhea R11.10 787.03    R19.7 787.91   2. Sore throat J02.9 462                             Dino Rodgers Jr., MD  12/31/19 0719

## 2019-12-31 NOTE — ED PROVIDER NOTES
Encounter Date: 12/31/2019       History     Chief Complaint   Patient presents with    Emesis     59-year-old female with a history of severe allergies presents to the ER with nausea and vomiting.  Patient states that around 1130 last night, she developed some nausea.  Around 1:00 a.m., she began vomiting.  Emesis was nonbloody and nonbilious.  Associated loose, brown stool.  Endorses some sore throat, pain with swallowing, and feeling like her throat was closing up.  Denies fever, chest pain, shortness of breath, abdominal pain, or changes in bladder function.  Denies sick contacts or suspicious foods.  Denies any new exposures, including medications, foods, detergents, creams, etc.  Of note, patient saw her allergist earlier today.  She was diagnosed with thrush, but she has not taken any medications yet.        Review of patient's allergies indicates:   Allergen Reactions    Benzonatate Other (See Comments)     dizziness     Past Medical History:   Diagnosis Date    Acute allergic rhinitis due to pollen     Allergy to dust     Chronic cough     Chronic rhinitis     Other pulmonary insufficiency, not elsewhere classified      No past surgical history on file.  Family History   Problem Relation Age of Onset    Asthma Mother     Emphysema Mother     Hypertension Mother     Diabetes Father     Kidney disease Father     Heart failure Maternal Grandmother     Hypertension Maternal Grandmother     Diabetes Maternal Grandfather     Heart failure Maternal Grandfather     Cancer Paternal Grandmother         ovarian     Social History     Tobacco Use    Smoking status: Never Smoker    Smokeless tobacco: Never Used   Substance Use Topics    Alcohol use: No    Drug use: No     Review of Systems   Constitutional: Negative for fever.   HENT: Positive for sore throat and trouble swallowing. Negative for rhinorrhea.    Respiratory: Negative for shortness of breath.    Cardiovascular: Negative for chest  pain.   Gastrointestinal: Positive for diarrhea, nausea and vomiting. Negative for abdominal pain.   Genitourinary: Negative for dysuria.   Musculoskeletal: Negative for back pain.   Skin: Negative for rash.   Neurological: Negative for weakness.   Hematological: Does not bruise/bleed easily.   All other systems reviewed and are negative.      Physical Exam     Initial Vitals [12/31/19 0424]   BP Pulse Resp Temp SpO2   (!) 158/76 100 20 98.5 °F (36.9 °C) 98 %      MAP       --         Physical Exam    Constitutional: She appears well-developed and well-nourished. No distress.   HENT:   Head: Normocephalic and atraumatic.   Nose: No mucosal edema or rhinorrhea.   Mouth/Throat: Mucous membranes are normal. No uvula swelling. Posterior oropharyngeal erythema present. No oropharyngeal exudate, posterior oropharyngeal edema or tonsillar abscesses.   Mild posterior or pharyngeal erythema.  No swelling or uvular deviation.  Airways patent, tolerating secretions.   Eyes: Conjunctivae and EOM are normal. Pupils are equal, round, and reactive to light.   Neck: Normal range of motion.   Cardiovascular: Normal rate, regular rhythm, normal heart sounds and intact distal pulses.   No murmur heard.  Pulmonary/Chest: Breath sounds normal. No respiratory distress. She has no wheezes. She has no rhonchi. She has no rales.   Abdominal: Soft. Bowel sounds are normal. She exhibits no distension. There is no tenderness. There is no rigidity, no rebound, no guarding, no CVA tenderness, no tenderness at McBurney's point and negative Sotelo's sign.   Musculoskeletal: Normal range of motion. She exhibits no edema or tenderness.   Neurological: She is alert.   Skin: Skin is warm and dry.   Psychiatric: She has a normal mood and affect. Thought content normal.         ED Course   Procedures  Labs Reviewed   THROAT SCREEN, RAPID   CBC W/ AUTO DIFFERENTIAL   B-TYPE NATRIURETIC PEPTIDE   LIPASE   INFLUENZA A AND B ANTIGEN          Imaging Results     None          Medical Decision Making:   Initial Assessment:   59-year-old female with a history of severe allergies presents to the ER with nausea and vomiting, sore throat.  ED Management:  Plan:  Afebrile, vital signs stable.  Labs, lipase.  Rapid flu and strep.  I do not appreciate any thrush on my exam.  She has no evidence of RPA or PTA.     Reassessed.  Patient lying in bed in no acute distress. States that she was feeling better.  Tolerating p.o..  No emesis here.  Still complaining of pain with swallowing.  Her note from Dr. Corado notes that this is an ongoing issue that she is managing.  Lab showed WBC 12.1.  BUN and creatinine pending.  Lipase 23.  Flu and strep negative. Suspect that her acute symptoms of nausea, vomiting, diarrhea is likely gastroenteritis.  She has no abdominal tenderness and abdomen is benign on my exam.  Low suspicion for bacterial infection.  If renal function is normal, patient can be discharged to outpatient follow-up with Zofran.  Discussed with patient and given return precautions.  She understands the plan.                                 Clinical Impression:       ICD-10-CM ICD-9-CM   1. Vomiting and diarrhea R11.10 787.03    R19.7 787.91   2. Sore throat J02.9 462                             Yovany Hogue MD  12/31/19 0657

## 2019-12-31 NOTE — ED NOTES
Bed rails are up and call light is within patient reach.  at bedside.     APPEARANCE: Pt appears uncomfortable. Lying on left side under blanket.   NEURO: AAO x 3. Cooperative.    PERIPHERAL VASCULAR: Radial pulses present and regular. Cap refill less than 3 seconds.  RESPIRATORY: Respirations are equal and unlabored.   ABDOMEN: Soft, non-tender, non-distended. Pt c/o vomiting and feels like her throat is closing up.   SKIN: Cool, dry, and pale. Slightly sluggish turgor. Mucous membranes sticky/ slightly dry.

## 2020-01-02 LAB — BACTERIA THROAT CULT: NORMAL

## 2020-01-09 ENCOUNTER — OFFICE VISIT (OUTPATIENT)
Dept: FAMILY MEDICINE | Facility: CLINIC | Age: 60
End: 2020-01-09
Payer: COMMERCIAL

## 2020-01-09 VITALS
DIASTOLIC BLOOD PRESSURE: 82 MMHG | HEIGHT: 65 IN | OXYGEN SATURATION: 99 % | SYSTOLIC BLOOD PRESSURE: 120 MMHG | BODY MASS INDEX: 32.4 KG/M2 | RESPIRATION RATE: 18 BRPM | HEART RATE: 87 BPM | WEIGHT: 194.44 LBS

## 2020-01-09 DIAGNOSIS — Z12.31 BREAST CANCER SCREENING BY MAMMOGRAM: ICD-10-CM

## 2020-01-09 DIAGNOSIS — K21.9 GASTROESOPHAGEAL REFLUX DISEASE, ESOPHAGITIS PRESENCE NOT SPECIFIED: Primary | ICD-10-CM

## 2020-01-09 DIAGNOSIS — R13.19 ESOPHAGEAL DYSPHAGIA: ICD-10-CM

## 2020-01-09 DIAGNOSIS — Z12.11 ENCOUNTER FOR SCREENING FOR COLORECTAL MALIGNANT NEOPLASM: ICD-10-CM

## 2020-01-09 DIAGNOSIS — Z13.220 SCREENING, LIPID: ICD-10-CM

## 2020-01-09 DIAGNOSIS — Z00.00 ANNUAL PHYSICAL EXAM: ICD-10-CM

## 2020-01-09 DIAGNOSIS — Z13.1 SCREENING FOR DIABETES MELLITUS: ICD-10-CM

## 2020-01-09 DIAGNOSIS — Z23 NEED FOR SHINGLES VACCINE: ICD-10-CM

## 2020-01-09 DIAGNOSIS — Z12.12 ENCOUNTER FOR SCREENING FOR COLORECTAL MALIGNANT NEOPLASM: ICD-10-CM

## 2020-01-09 DIAGNOSIS — Z11.59 ENCOUNTER FOR HEPATITIS C SCREENING TEST FOR LOW RISK PATIENT: ICD-10-CM

## 2020-01-09 DIAGNOSIS — Z23 NEED FOR DIPHTHERIA-TETANUS-PERTUSSIS (TDAP) VACCINE: ICD-10-CM

## 2020-01-09 PROBLEM — B37.0 THRUSH: Status: RESOLVED | Noted: 2019-12-30 | Resolved: 2020-01-09

## 2020-01-09 PROCEDURE — 99204 PR OFFICE/OUTPT VISIT, NEW, LEVL IV, 45-59 MIN: ICD-10-PCS | Mod: 25,S$GLB,, | Performed by: INTERNAL MEDICINE

## 2020-01-09 PROCEDURE — 99204 OFFICE O/P NEW MOD 45 MIN: CPT | Mod: 25,S$GLB,, | Performed by: INTERNAL MEDICINE

## 2020-01-09 PROCEDURE — 90471 TDAP VACCINE GREATER THAN OR EQUAL TO 7YO IM: ICD-10-PCS | Mod: S$GLB,,, | Performed by: INTERNAL MEDICINE

## 2020-01-09 PROCEDURE — 3008F BODY MASS INDEX DOCD: CPT | Mod: S$GLB,,, | Performed by: INTERNAL MEDICINE

## 2020-01-09 PROCEDURE — 3008F PR BODY MASS INDEX (BMI) DOCUMENTED: ICD-10-PCS | Mod: S$GLB,,, | Performed by: INTERNAL MEDICINE

## 2020-01-09 PROCEDURE — 90471 IMMUNIZATION ADMIN: CPT | Mod: S$GLB,,, | Performed by: INTERNAL MEDICINE

## 2020-01-09 PROCEDURE — 90715 TDAP VACCINE 7 YRS/> IM: CPT | Mod: S$GLB,,, | Performed by: INTERNAL MEDICINE

## 2020-01-09 PROCEDURE — 90715 TDAP VACCINE GREATER THAN OR EQUAL TO 7YO IM: ICD-10-PCS | Mod: S$GLB,,, | Performed by: INTERNAL MEDICINE

## 2020-01-09 RX ORDER — SUCRALFATE 1 G/1
1 TABLET ORAL
Qty: 60 TABLET | Refills: 1 | Status: SHIPPED | OUTPATIENT
Start: 2020-01-09 | End: 2020-02-24

## 2020-01-09 RX ORDER — HYDROGEN PEROXIDE 3 %
20 SOLUTION, NON-ORAL MISCELLANEOUS
Qty: 14 CAPSULE | Refills: 1 | Status: SHIPPED | OUTPATIENT
Start: 2020-01-09 | End: 2020-02-24

## 2020-01-09 NOTE — ASSESSMENT & PLAN NOTE
I suspect that mild esophageal damage from forceful vomiting of stomach contents was causing patient's symptoms.  Will treat with 2 weeks of PPI with Carafate as needed.  If not improving, will consider GI referral.

## 2020-01-09 NOTE — PROGRESS NOTES
NEW ADULT PATIENT NOTE    Subjective:     Chief Complaint:  Establish Care      History of Present Illness:   Kaia Nelson is a 59 y.o. female here to establish care with me.  Patient has history of asthma and allergic rhinitis for which she is followed by Dr. Corado.  She has not had a primary care doctor in several years, and is here to establish care and get updated on health maintenance.  She also has an acute complaint today.  About 10 days ago, patient started noticing a very mild sore throat.  She was seen by her allergist that day and noted to have some pharyngeal erythema.  There was concern for thrush and patient was given a prescription for nystatin.  Patient had not yet picked up the nystatin that night when she started having repeated episodes of non bilious/nonbloody emesis.  She spent most of the night throwing up.  After several hours of throwing up, patient started to experience feeling of her throat closing up, making it more difficult to swallow.  She denies sensation of difficulty breathing.  Despite the sensation of difficulty swallowing and pain with swallowing, she was able to swallow her saliva.  The feeling of her throat closing was scary to patient's so she presented to the emergency room for evaluation.  In the ER, patient was evaluated clinically, also had basic labs and flu swab which were all within normal limits.  She received some IV fluids to help her recover from her gastroenteritis.  When she was able to tolerate p.o. she was discharged home.  Patient reports that since that time, she continues to feel a mildly painful sensation deep in her throat, almost at the level of her clavicles.  It only occurs with swallowing, and has been improving over time.  It seems to be worse with certain foods, especially cold foods.  She also thought she noticed some mild swelling in that same area which she feels has improved since then.  She denies  any abdominal pain, heartburn, chest pain, postnasal drip, difficulty swallowing, choking.      Past Medical History:   Diagnosis Date    Acute allergic rhinitis due to pollen     Allergy to dust     Chronic cough     Chronic rhinitis     Other pulmonary insufficiency, not elsewhere classified        Family History   Problem Relation Age of Onset    Asthma Mother     Emphysema Mother     Hypertension Mother     Diabetes Father     Kidney disease Father     Heart failure Maternal Grandmother     Hypertension Maternal Grandmother     Diabetes Maternal Grandfather     Heart failure Maternal Grandfather     Cancer Paternal Grandmother         ovarian       Social History     Socioeconomic History    Marital status:      Spouse name: Not on file    Number of children: Not on file    Years of education: Not on file    Highest education level: Not on file   Occupational History    Not on file   Social Needs    Financial resource strain: Not on file    Food insecurity:     Worry: Not on file     Inability: Not on file    Transportation needs:     Medical: Not on file     Non-medical: Not on file   Tobacco Use    Smoking status: Never Smoker    Smokeless tobacco: Never Used   Substance and Sexual Activity    Alcohol use: No    Drug use: No    Sexual activity: Not on file   Lifestyle    Physical activity:     Days per week: Not on file     Minutes per session: Not on file    Stress: Not on file   Relationships    Social connections:     Talks on phone: Not on file     Gets together: Not on file     Attends [employer] service: Not on file     Active member of club or organization: Not on file     Attends meetings of clubs or organizations: Not on file     Relationship status: Not on file   Other Topics Concern    Not on file   Social History Narrative    Not on file       ROS:  Review of Systems   Constitutional: Negative for activity change, appetite change, fatigue and unexpected weight  change.   HENT: Positive for sore throat and trouble swallowing. Negative for congestion, ear pain, rhinorrhea, sinus pressure and sinus pain.    Eyes: Negative for pain, redness and visual disturbance.   Respiratory: Positive for cough. Negative for chest tightness, shortness of breath and wheezing.    Cardiovascular: Negative for chest pain and palpitations.   Gastrointestinal: Positive for vomiting. Negative for abdominal pain, constipation, diarrhea and nausea.   Endocrine: Negative for cold intolerance, heat intolerance, polydipsia and polyuria.   Genitourinary: Negative for difficulty urinating, dysuria, flank pain, frequency, pelvic pain, vaginal bleeding and vaginal discharge.   Musculoskeletal: Negative for arthralgias and joint swelling.   Skin: Negative for rash.   Allergic/Immunologic: Negative for environmental allergies and food allergies.   Neurological: Negative for dizziness, seizures, syncope, weakness and headaches.   Hematological: Negative for adenopathy.   Psychiatric/Behavioral: Negative for confusion, dysphoric mood and suicidal ideas. The patient is not nervous/anxious.           Current Outpatient Medications:     albuterol (PROAIR HFA) 90 mcg/actuation inhaler, Inhale 2 puffs into the lungs every 6 (six) hours as needed for Wheezing or Shortness of Breath (COUGH)., Disp: 18 g, Rfl: 3    azelastine (ASTELIN) 137 mcg (0.1 %) nasal spray, INSTILL ONE SPRAY INTO EACH NOSTRIL TWICE DAILY, Disp: 90 mL, Rfl: 3    finasteride (PROPECIA) 1 mg tablet, Take 1 mg by mouth once daily., Disp: , Rfl:     fluticasone (FLONASE) 50 mcg/actuation nasal spray, 2 sprays by Each Nare route 2 (two) times daily., Disp: , Rfl:     fluticasone propionate (FLOVENT HFA) 220 mcg/actuation inhaler, INHALE 2 PUFFS INTO THE LUNGS 2 (TWO) TIMES DAILY. CONTROLLER, Disp: 36 g, Rfl: 2    inhalation spacing device (AEROCHAMBER PLUS Z STAT), Use as directed for inhalation., Disp: 1 Device, Rfl: 3    minoxidil (LONITEN)  "10 MG Tab, Take 2.5 mg by mouth once daily., Disp: , Rfl:     montelukast (SINGULAIR) 10 mg tablet, TAKE 1 TABLET BY MOUTH EVERY DAY, Disp: 90 tablet, Rfl: 2    nystatin (MYCOSTATIN) 100,000 unit/mL suspension, Take 6 mLs (600,000 Units total) by mouth 4 (four) times daily. for 10 days, Disp: 240 mL, Rfl: 1    tiotropium bromide (SPIRIVA RESPIMAT) 1.25 mcg/actuation Mist, Inhale 2 puffs into the lungs once daily. Controller, Disp: 12 g, Rfl: 3    vitamin D (VITAMIN D3) 1000 units Tab, Take 1,000 Units by mouth once daily., Disp: , Rfl:     biotin 5 mg Cap, Take by mouth., Disp: , Rfl:     clotrimazole (LOTRIMIN) 1 % cream, Apply topically three times daily with food. Until gone. for 3 doses, Disp: 45 g, Rfl: 3    esomeprazole (NEXIUM) 20 MG capsule, Take 1 capsule (20 mg total) by mouth before breakfast. for 14 days, Disp: 14 capsule, Rfl: 1    ondansetron (ZOFRAN-ODT) 4 MG TbDL, Take 1 tablet (4 mg total) by mouth every 8 (eight) hours as needed., Disp: 12 tablet, Rfl: 0    sucralfate (CARAFATE) 1 gram tablet, Take 1 tablet (1 g total) by mouth 4 (four) times daily before meals and nightly. As needed for pain, Disp: 60 tablet, Rfl: 1    varicella-zoster gE-AS01B, PF, (SHINGRIX, PF,) 50 mcg/0.5 mL injection, Inject 0.5 mLs into the muscle once. for 1 dose, Disp: 0.5 mL, Rfl: 0    varicella-zoster gE-AS01B, PF, (SHINGRIX, PF,) 50 mcg/0.5 mL injection, Inject 0.5 mLs into the muscle once. for 1 dose, Disp: 0.5 mL, Rfl: 0        Objective:       Physical Examination:     /82   Pulse 87   Resp 18   Ht 5' 5" (1.651 m)   Wt 88.2 kg (194 lb 7 oz)   SpO2 99%   BMI 32.36 kg/m²     Physical Exam   Constitutional: She is oriented to person, place, and time. She appears well-developed and well-nourished. No distress.   HENT:   Head: Normocephalic and atraumatic.   Right Ear: Tympanic membrane and external ear normal.   Left Ear: Tympanic membrane and external ear normal.   Nose: Nose normal. No mucosal " edema. Right sinus exhibits no maxillary sinus tenderness and no frontal sinus tenderness. Left sinus exhibits no maxillary sinus tenderness and no frontal sinus tenderness.   Mouth/Throat: Oropharynx is clear and moist and mucous membranes are normal. No oropharyngeal exudate or posterior oropharyngeal erythema.   Eyes: Pupils are equal, round, and reactive to light. Conjunctivae and EOM are normal. Right eye exhibits no discharge. Left eye exhibits no discharge.   Neck: Normal range of motion. Neck supple. No thyromegaly present.   Cardiovascular: Normal rate, regular rhythm, normal heart sounds and intact distal pulses.   No murmur heard.  Pulmonary/Chest: Effort normal and breath sounds normal. No respiratory distress. She has no wheezes. She has no rales.   Abdominal: Soft. Bowel sounds are normal. She exhibits no distension and no mass. There is no tenderness. There is no guarding.   Musculoskeletal: She exhibits no edema.   Lymphadenopathy:     She has no cervical adenopathy.   Neurological: She is alert and oriented to person, place, and time.   Skin: Skin is warm and dry. She is not diaphoretic.         Assessment/Plan:   Kaia is a 59 y.o. female here to establish care.  Health maintenance ordered including screening labs, mammogram, colon cancer screening, Tdap, shingles vaccine.    Problem List Items Addressed This Visit        GI    Esophageal dysphagia    Current Assessment & Plan     I suspect that mild esophageal damage from forceful vomiting of stomach contents was causing patient's symptoms.  Will treat with 2 weeks of PPI with Carafate as needed.  If not improving, will consider GI referral.         Relevant Medications    esomeprazole (NEXIUM) 20 MG capsule    sucralfate (CARAFATE) 1 gram tablet      Other Visit Diagnoses     Gastroesophageal reflux disease, esophagitis presence not specified    -  Primary    Relevant Medications    esomeprazole (NEXIUM) 20 MG capsule    Encounter for screening  for colorectal malignant neoplasm        Relevant Orders    Cologuard Screening (Multitarget Stool DNA)    Breast cancer screening by mammogram        Relevant Orders    Mammo Digital Screening Bilat    Need for diphtheria-tetanus-pertussis (Tdap) vaccine        Relevant Orders    Tdap Vaccine (Completed)    Encounter for hepatitis C screening test for low risk patient        Relevant Orders    Hepatitis C antibody    Annual physical exam        Relevant Orders    Lipid panel    Mammo Digital Screening Bilat    Screening, lipid        Relevant Orders    Lipid panel    Screening for diabetes mellitus        Need for shingles vaccine        Relevant Medications    varicella-zoster gE-AS01B, PF, (SHINGRIX, PF,) 50 mcg/0.5 mL injection    varicella-zoster gE-AS01B, PF, (SHINGRIX, PF,) 50 mcg/0.5 mL injection          Health Maintenance   Topic Date Due    Hepatitis C Screening  1960    Lipid Panel  1960    TETANUS VACCINE  09/28/1980    Pneumococcal Vaccine (Medium Risk) (1 of 1 - PPSV23) 09/28/1981    Pap Smear with HPV Cotest  09/28/1983    Mammogram  09/28/2002    Colonoscopy  09/28/2012       Discussion:     Follow up in about 1 month (around 2/9/2020) for pap and follow-up.    Goals    None         Electronically signed by Demetra Kiran

## 2020-01-12 ENCOUNTER — PATIENT MESSAGE (OUTPATIENT)
Dept: FAMILY MEDICINE | Facility: CLINIC | Age: 60
End: 2020-01-12

## 2020-01-13 ENCOUNTER — PATIENT MESSAGE (OUTPATIENT)
Dept: FAMILY MEDICINE | Facility: CLINIC | Age: 60
End: 2020-01-13

## 2020-01-17 ENCOUNTER — LAB VISIT (OUTPATIENT)
Dept: LAB | Facility: HOSPITAL | Age: 60
End: 2020-01-17
Attending: INTERNAL MEDICINE
Payer: COMMERCIAL

## 2020-01-17 ENCOUNTER — HOSPITAL ENCOUNTER (OUTPATIENT)
Dept: RADIOLOGY | Facility: HOSPITAL | Age: 60
Discharge: HOME | End: 2020-01-17
Attending: INTERNAL MEDICINE
Payer: COMMERCIAL

## 2020-01-17 VITALS — BODY MASS INDEX: 32.4 KG/M2 | WEIGHT: 194.44 LBS | HEIGHT: 65 IN

## 2020-01-17 DIAGNOSIS — Z12.31 BREAST CANCER SCREENING BY MAMMOGRAM: ICD-10-CM

## 2020-01-17 DIAGNOSIS — Z00.00 ANNUAL PHYSICAL EXAM: ICD-10-CM

## 2020-01-17 DIAGNOSIS — Z13.220 SCREENING, LIPID: ICD-10-CM

## 2020-01-17 DIAGNOSIS — Z11.59 ENCOUNTER FOR HEPATITIS C SCREENING TEST FOR LOW RISK PATIENT: ICD-10-CM

## 2020-01-17 LAB
CHOLEST SERPL-MCNC: 188 MG/DL (ref 120–199)
CHOLEST/HDLC SERPL: 3.1 {RATIO} (ref 2–5)
HDLC SERPL-MCNC: 60 MG/DL (ref 40–75)
HDLC SERPL: 31.9 % (ref 20–50)
LDLC SERPL CALC-MCNC: 118.4 MG/DL (ref 63–159)
NONHDLC SERPL-MCNC: 128 MG/DL
TRIGL SERPL-MCNC: 48 MG/DL (ref 30–150)

## 2020-01-17 PROCEDURE — 80061 LIPID PANEL: CPT

## 2020-01-17 PROCEDURE — 77067 SCR MAMMO BI INCL CAD: CPT | Mod: TC,PO

## 2020-01-17 PROCEDURE — 86803 HEPATITIS C AB TEST: CPT

## 2020-01-18 LAB — HCV AB S/CO SERPL IA: 0.1 S/CO RATIO (ref 0–0.9)

## 2020-02-24 ENCOUNTER — HOSPITAL ENCOUNTER (OUTPATIENT)
Dept: RADIOLOGY | Facility: HOSPITAL | Age: 60
Discharge: HOME | End: 2020-02-24
Attending: INTERNAL MEDICINE
Payer: COMMERCIAL

## 2020-02-24 ENCOUNTER — OFFICE VISIT (OUTPATIENT)
Dept: FAMILY MEDICINE | Facility: CLINIC | Age: 60
End: 2020-02-24
Payer: COMMERCIAL

## 2020-02-24 ENCOUNTER — LAB VISIT (OUTPATIENT)
Dept: LAB | Facility: HOSPITAL | Age: 60
End: 2020-02-24
Attending: INTERNAL MEDICINE
Payer: COMMERCIAL

## 2020-02-24 VITALS
WEIGHT: 199 LBS | DIASTOLIC BLOOD PRESSURE: 80 MMHG | SYSTOLIC BLOOD PRESSURE: 130 MMHG | BODY MASS INDEX: 33.12 KG/M2 | RESPIRATION RATE: 16 BRPM | TEMPERATURE: 99 F

## 2020-02-24 DIAGNOSIS — R79.9 ABNORMAL BLOOD CHEMISTRY: ICD-10-CM

## 2020-02-24 DIAGNOSIS — R92.8 ABNORMAL MAMMOGRAM OF RIGHT BREAST: ICD-10-CM

## 2020-02-24 DIAGNOSIS — R92.2 INCONCLUSIVE MAMMOGRAM: ICD-10-CM

## 2020-02-24 DIAGNOSIS — Z12.4 PAP SMEAR FOR CERVICAL CANCER SCREENING: Primary | ICD-10-CM

## 2020-02-24 DIAGNOSIS — R13.19 ESOPHAGEAL DYSPHAGIA: ICD-10-CM

## 2020-02-24 PROCEDURE — 99214 OFFICE O/P EST MOD 30 MIN: CPT | Mod: S$GLB,,, | Performed by: INTERNAL MEDICINE

## 2020-02-24 PROCEDURE — 3008F PR BODY MASS INDEX (BMI) DOCUMENTED: ICD-10-PCS | Mod: S$GLB,,, | Performed by: INTERNAL MEDICINE

## 2020-02-24 PROCEDURE — 76642 ULTRASOUND BREAST LIMITED: CPT | Mod: TC,50,PO

## 2020-02-24 PROCEDURE — 83036 HEMOGLOBIN GLYCOSYLATED A1C: CPT

## 2020-02-24 PROCEDURE — 3008F BODY MASS INDEX DOCD: CPT | Mod: S$GLB,,, | Performed by: INTERNAL MEDICINE

## 2020-02-24 PROCEDURE — 77062 BREAST TOMOSYNTHESIS BI: CPT | Mod: TC,PO

## 2020-02-24 PROCEDURE — 99214 PR OFFICE/OUTPT VISIT, EST, LEVL IV, 30-39 MIN: ICD-10-PCS | Mod: S$GLB,,, | Performed by: INTERNAL MEDICINE

## 2020-02-24 PROCEDURE — 36415 COLL VENOUS BLD VENIPUNCTURE: CPT

## 2020-02-24 RX ORDER — MINOXIDIL 2.5 MG/1
1.25 TABLET ORAL DAILY
COMMUNITY
Start: 2020-01-09 | End: 2021-04-07

## 2020-02-24 RX ORDER — FINASTERIDE 5 MG/1
5 TABLET, FILM COATED ORAL DAILY
COMMUNITY
Start: 2020-01-23 | End: 2021-04-21

## 2020-02-24 NOTE — PROGRESS NOTES
Well Woman Pap Smear    Chief Complaint   Patient presents with    Follow-up     gerd, esophageal dysphagia       59-year-old woman here for follow-up as well as for Pap smear.  Patient was seen last month for a new patient visit, at that time she was complaining of some mild esophageal dysphagia following what was likely a viral illness with vomiting.  Patient did a 2 week course of a PPI and had complete resolution of symptoms.  I reviewed with patient her recent screening labs which were all within normal limits other than elevated fasting glucose.  Patient also had her mammogram done which was BI-RADS 0.  Patient is scheduled for diagnostic mammogram and ultrasound later today.  She is here today for her Pap smear.  She thinks her last Pap smear was more than 10 years ago.  She denies any vaginal bleeding, discharge, dyspareunia.       There is no history of significant gyn problems or procedures., Denies, dysuria, hematuria, urinary incontinence, vaginal discharge, abnormal vaginal bleeding, pelvic pain, dyspareunia    Past medical, social and family history reviewed and there are no pertinent changes.       Current Outpatient Medications:     azelastine (ASTELIN) 137 mcg (0.1 %) nasal spray, INSTILL ONE SPRAY INTO EACH NOSTRIL TWICE DAILY, Disp: 90 mL, Rfl: 3    finasteride (PROPECIA) 1 mg tablet, Take 1 mg by mouth once daily., Disp: , Rfl:     finasteride (PROSCAR) 5 mg tablet, Take 5 mg by mouth once daily., Disp: , Rfl:     fluticasone (FLONASE) 50 mcg/actuation nasal spray, 2 sprays by Each Nare route 2 (two) times daily., Disp: , Rfl:     fluticasone propionate (FLOVENT HFA) 220 mcg/actuation inhaler, INHALE 2 PUFFS INTO THE LUNGS 2 (TWO) TIMES DAILY. CONTROLLER, Disp: 36 g, Rfl: 2    inhalation spacing device (AEROCHAMBER PLUS Z STAT), Use as directed for inhalation., Disp: 1 Device, Rfl: 3    minoxidil (LONITEN) 2.5 MG tablet, Take 1.25 mg by mouth once daily., Disp: , Rfl:      montelukast (SINGULAIR) 10 mg tablet, TAKE 1 TABLET BY MOUTH EVERY DAY, Disp: 90 tablet, Rfl: 2    tiotropium bromide (SPIRIVA RESPIMAT) 1.25 mcg/actuation Mist, Inhale 2 puffs into the lungs once daily. Controller, Disp: 12 g, Rfl: 3    vitamin D (VITAMIN D3) 1000 units Tab, Take 1,000 Units by mouth once daily., Disp: , Rfl:     albuterol (PROAIR HFA) 90 mcg/actuation inhaler, Inhale 2 puffs into the lungs every 6 (six) hours as needed for Wheezing or Shortness of Breath (COUGH). (Patient not taking: Reported on 2/24/2020), Disp: 18 g, Rfl: 3    minoxidil (LONITEN) 10 MG Tab, Take 2.5 mg by mouth once daily., Disp: , Rfl:     Vitals:    02/24/20 0852   BP: 130/80   BP Location: Left arm   Patient Position: Sitting   BP Method: Large (Manual)   Resp: 16   Temp: 98.6 °F (37 °C)   TempSrc: Axillary   Weight: 90.3 kg (199 lb)       Physical Exam   Constitutional: She appears well-developed and well-nourished. No distress.   Cardiovascular: Normal rate, regular rhythm and normal heart sounds.   Pulmonary/Chest: Effort normal and breath sounds normal. No respiratory distress.   Genitourinary: Rectum normal, vagina normal and uterus normal. There is no rash, tenderness, lesion or injury on the right labia. There is no rash, tenderness, lesion or injury on the left labia. Cervix exhibits friability. Cervix exhibits no motion tenderness and no discharge. Right adnexum displays no mass, no tenderness and no fullness. Left adnexum displays no mass, no tenderness and no fullness.       Asessment/Plan:  Kaia is a 59 y.o. female here for pap smear.     Problem List Items Addressed This Visit        Renal/    Abnormal mammogram of right breast    Current Assessment & Plan     Scheduled for dx mammo and u/s today.             GI    Esophageal dysphagia    Current Assessment & Plan     Resoved after PPI treatment.             Other    Abnormal blood chemistry    Current Assessment & Plan     A1c ordered to f/u on  elevated fasting glucose.          Relevant Orders    Hemoglobin A1c      Other Visit Diagnoses     Pap smear for cervical cancer screening    -  Primary    Relevant Orders    Pap Test - Chlamydia/Gonococcus/Trichomonas, BRANDEN & HPV

## 2020-02-25 LAB
ESTIMATED AVG GLUCOSE: 117 MG/DL (ref 68–131)
HBA1C MFR BLD HPLC: 5.7 % (ref 4.5–6.2)

## 2020-02-26 ENCOUNTER — PATIENT MESSAGE (OUTPATIENT)
Dept: FAMILY MEDICINE | Facility: CLINIC | Age: 60
End: 2020-02-26

## 2020-02-27 LAB
C TRACH RRNA CVX QL NAA+PROBE: NEGATIVE
CYTOLOGIST CVX/VAG CYTO: NORMAL
CYTOLOGY CVX/VAG DOC CYTO: NORMAL
DX ICD CODE: NORMAL
HPV I/H RISK 1 DNA CVX QL PROBE+SIG AMP: NEGATIVE
Lab: NORMAL
N GONORRHOEA RRNA CVX QL NAA+PROBE: NEGATIVE
OTHER STN SPEC: NORMAL
STAT OF ADQ CVX/VAG CYTO-IMP: NORMAL
T VAGINALIS RRNA SPEC QL NAA+PROBE: NEGATIVE

## 2020-03-08 ENCOUNTER — PATIENT MESSAGE (OUTPATIENT)
Dept: FAMILY MEDICINE | Facility: CLINIC | Age: 60
End: 2020-03-08

## 2020-03-09 ENCOUNTER — TELEPHONE (OUTPATIENT)
Dept: FAMILY MEDICINE | Facility: CLINIC | Age: 60
End: 2020-03-09

## 2020-03-09 ENCOUNTER — PATIENT MESSAGE (OUTPATIENT)
Dept: FAMILY MEDICINE | Facility: CLINIC | Age: 60
End: 2020-03-09

## 2020-03-09 RX ORDER — ALPRAZOLAM 0.25 MG/1
0.25 TABLET ORAL 3 TIMES DAILY PRN
Qty: 30 TABLET | Refills: 0 | Status: SHIPPED | OUTPATIENT
Start: 2020-03-09 | End: 2021-04-07

## 2020-03-20 ENCOUNTER — HOSPITAL ENCOUNTER (OUTPATIENT)
Dept: RADIOLOGY | Facility: HOSPITAL | Age: 60
Discharge: HOME | End: 2020-03-20
Attending: INTERNAL MEDICINE
Payer: COMMERCIAL

## 2020-03-20 ENCOUNTER — PATIENT MESSAGE (OUTPATIENT)
Dept: FAMILY MEDICINE | Facility: CLINIC | Age: 60
End: 2020-03-20

## 2020-03-20 VITALS — HEIGHT: 65 IN | WEIGHT: 198.44 LBS | BODY MASS INDEX: 33.06 KG/M2

## 2020-03-20 DIAGNOSIS — R92.8 ABNORMAL MAMMOGRAM: ICD-10-CM

## 2020-03-20 RX ORDER — GADOBUTROL 604.72 MG/ML
9 INJECTION INTRAVENOUS
Status: DISCONTINUED | OUTPATIENT
Start: 2020-03-20 | End: 2020-03-21 | Stop reason: HOSPADM

## 2020-03-23 NOTE — PROGRESS NOTES
Subjective:       Patient ID: Kaia Nelson is a 59 y.o. female.    Chief Complaint: Cough and Rhinitis    The patient location is: home   The chief complaint leading to consultation is: cough   Visit type: Virtual visit with synchronous audio and video    Each patient to whom he or she provides medical services by telemedicine is:  (1) informed of the relationship between the physician and patient and the respective role of any other health care provider with respect to management of the patient; and (2) notified that he or she may decline to receive medical services by telemedicine and may withdraw from such care at any time.    Pt has small airway disease now with cough that is improved   Pt states cough is better with spiriva   Tx: at her last visit, started on flovent, spiriva   Using 2 puffs of spiriva in the morning, montelukast 10 mg po qday- no change in mental status.   tx: asmanex 2 bid was changed to flovent 220 2 puffs qhs . symbicort sample 80 2 puffs bid given. Not much difference with the symbicort 2 puffs BID 80 mcg.   Rescue inhaler: ventolin   briana frequ: not had to use.   Azelastine 1 spray per nare qam magic mouth wash - didn't help.   Saline: once per day at night.     Concern that going outside and start with her cough as her last exacerbation occurred while riding a bike.     Prior tx:   augmentin- 6/2019   Steroids 6/2019  azithromycin 10 day course and chest xray showed streakiness and concern for atypical mycoplasma and was tx x 10 days.         General: neg unexpected weight changes, fevers, chills, night sweats, malaise  HEENT: see hpi, Neg eye pain, vision changes, ear drainage, nose bleeds, throat tightness, sores in the mouth  CV: Neg chest pain, palpitations, swelling  Resp: see hpi, neg hemoptysis  GI: see hpi, neg dysphagia, night abdominal pain, reflux, chronic diarrhea, chronic constipation  Derm: See Hpi, neg new rash, neg flushing  Mu/sk: Neg joint pain, joint swelling   Psych:  Neg anxiety  neuro: neg chronic headaches, muscle weakness  Endo: neg heat/cold intolerance, chronic fatigue     Objective:       There were no vitals filed for this visit.  Due to telemed     Physical Exam      General: no acute distress, well developed well nourished       Assessment:       1. Small airways disease    2. Non-seasonal allergic rhinitis due to pollen    3. Esophageal dysphagia        Plan:       Small airways disease    Non-seasonal allergic rhinitis due to pollen    Esophageal dysphagia          Small airway disease   - improved on current regimen  - continue flovent 2 puffs qhs.   - Continue spiriva 2 puffs daily   s/p 10 day course of azithro. - 12/2019  Not improved on steroids. 12/2019  flovent 2 puffs bid- in illness: if peak flow down, increase to 4 puffs bid x 2 weeks then back down to baseline dosing.   symbicort 80 2 puffs bid did not help.   magic mouthwash prn q 3-6 hours. Not helpful.   Chest radiograph showed streakiness and tx for atypical. 6/2019    Allergic rhinitis:  Discussed allergy shots and risk and benefit  Continue fluticasone, consider changing flonase to xhance , vs xlear, vs budesonide in saline rinse  Pt will let me know  For out side increase frequency of meds.     6/2019- Face was significantly swollen on the left- augmentin 875 mg BID, due to chills and fever feeling and how pale she is today will treat for flu like symptoms - 6/2019.     Follow up in 6 months, sooner if needed.     Total time spent with patient: 25 mins   > 50% in counseling and coordinating care.                   Diana Corado M.D.  Allergy/Immunology  [facility]   [phone] phone  [phone] fax

## 2020-03-24 ENCOUNTER — OFFICE VISIT (OUTPATIENT)
Dept: ALLERGY | Facility: CLINIC | Age: 60
End: 2020-03-24
Payer: COMMERCIAL

## 2020-03-24 DIAGNOSIS — J98.4 SMALL AIRWAYS DISEASE: Primary | ICD-10-CM

## 2020-03-24 DIAGNOSIS — R13.19 ESOPHAGEAL DYSPHAGIA: ICD-10-CM

## 2020-03-24 DIAGNOSIS — J30.1 NON-SEASONAL ALLERGIC RHINITIS DUE TO POLLEN: ICD-10-CM

## 2020-03-24 PROCEDURE — 99214 OFFICE O/P EST MOD 30 MIN: CPT | Mod: 95,,, | Performed by: ALLERGY & IMMUNOLOGY

## 2020-03-24 PROCEDURE — 99214 PR OFFICE/OUTPT VISIT, EST, LEVL IV, 30-39 MIN: ICD-10-PCS | Mod: 95,,, | Performed by: ALLERGY & IMMUNOLOGY

## 2020-03-24 NOTE — PATIENT INSTRUCTIONS
Nose:  Saline first     Options in saline are regular payam med salt packets or xlear which has xylitol in it. Most of my patients like the xylitol.   - this is for rinsing    Arm and hammer mist can be good for when you venture outside.     I like your current regimen, but for outside let's increase your frequency to every 6 hours if needed. 1 spray of fluticasone and azelastine after saline.     Lung:   I like the current regimen you have with spiriva 2 puffs daily and flovent 2 puffs nightly     If cough increases, I want you to use flovent 4 puffs twice daily x 2- 4 weeks      Things to consider :  Allergy injections    Changing to xhance that is like blowing and puffing flonase in the nose rather than a liquid    Putting budesonide, which is like flonase , in the sinus rinse to see if distribution of medication covers more surface area.     Since you are doing well, f/u in 6 months, sooner if needed of course.     Stay safe during the COVID outbreak. [place] is a major hotspot.

## 2020-04-19 DIAGNOSIS — J30.1 NON-SEASONAL ALLERGIC RHINITIS DUE TO POLLEN: ICD-10-CM

## 2020-04-21 RX ORDER — MONTELUKAST SODIUM 10 MG/1
TABLET ORAL
Qty: 90 TABLET | Refills: 2 | Status: SHIPPED | OUTPATIENT
Start: 2020-04-21 | End: 2021-01-18

## 2020-07-27 ENCOUNTER — LAB VISIT (OUTPATIENT)
Dept: LAB | Facility: HOSPITAL | Age: 60
End: 2020-07-27
Attending: DERMATOLOGY
Payer: COMMERCIAL

## 2020-07-27 DIAGNOSIS — Z79.899 ENCOUNTER FOR LONG-TERM (CURRENT) USE OF OTHER MEDICATIONS: ICD-10-CM

## 2020-07-27 DIAGNOSIS — L64.9 MALE PATTERN ALOPECIA: Primary | ICD-10-CM

## 2020-07-27 LAB
ALBUMIN SERPL BCP-MCNC: 4.3 G/DL (ref 3.5–5.2)
ALP SERPL-CCNC: 52 U/L (ref 55–135)
ALT SERPL W/O P-5'-P-CCNC: 17 U/L (ref 10–44)
ANION GAP SERPL CALC-SCNC: 12 MMOL/L (ref 8–16)
AST SERPL-CCNC: 16 U/L (ref 10–40)
BILIRUB SERPL-MCNC: 0.7 MG/DL (ref 0.1–1)
BUN SERPL-MCNC: 17 MG/DL (ref 6–20)
CALCIUM SERPL-MCNC: 9.2 MG/DL (ref 8.7–10.5)
CHLORIDE SERPL-SCNC: 102 MMOL/L (ref 95–110)
CO2 SERPL-SCNC: 26 MMOL/L (ref 23–29)
CREAT SERPL-MCNC: 0.9 MG/DL (ref 0.5–1.4)
EST. GFR  (AFRICAN AMERICAN): >60 ML/MIN/1.73 M^2
EST. GFR  (NON AFRICAN AMERICAN): >60 ML/MIN/1.73 M^2
GLUCOSE SERPL-MCNC: 102 MG/DL (ref 70–110)
POTASSIUM SERPL-SCNC: 4.2 MMOL/L (ref 3.5–5.1)
PROT SERPL-MCNC: 7.4 G/DL (ref 6–8.4)
SODIUM SERPL-SCNC: 140 MMOL/L (ref 136–145)

## 2020-07-27 PROCEDURE — 80053 COMPREHEN METABOLIC PANEL: CPT

## 2020-07-27 PROCEDURE — 36415 COLL VENOUS BLD VENIPUNCTURE: CPT

## 2020-09-25 ENCOUNTER — OFFICE VISIT (OUTPATIENT)
Dept: ALLERGY | Facility: CLINIC | Age: 60
End: 2020-09-25
Payer: COMMERCIAL

## 2020-09-25 VITALS
HEART RATE: 82 BPM | OXYGEN SATURATION: 97 % | HEIGHT: 65 IN | WEIGHT: 183 LBS | SYSTOLIC BLOOD PRESSURE: 127 MMHG | BODY MASS INDEX: 30.49 KG/M2 | TEMPERATURE: 97 F | DIASTOLIC BLOOD PRESSURE: 86 MMHG

## 2020-09-25 DIAGNOSIS — J30.1 NON-SEASONAL ALLERGIC RHINITIS DUE TO POLLEN: ICD-10-CM

## 2020-09-25 DIAGNOSIS — R05.3 CHRONIC COUGH: ICD-10-CM

## 2020-09-25 DIAGNOSIS — J98.4 SMALL AIRWAYS DISEASE: Primary | ICD-10-CM

## 2020-09-25 PROCEDURE — 3008F PR BODY MASS INDEX (BMI) DOCUMENTED: ICD-10-PCS | Mod: S$GLB,,, | Performed by: ALLERGY & IMMUNOLOGY

## 2020-09-25 PROCEDURE — 99213 PR OFFICE/OUTPT VISIT, EST, LEVL III, 20-29 MIN: ICD-10-PCS | Mod: S$GLB,,, | Performed by: ALLERGY & IMMUNOLOGY

## 2020-09-25 PROCEDURE — 99213 OFFICE O/P EST LOW 20 MIN: CPT | Mod: S$GLB,,, | Performed by: ALLERGY & IMMUNOLOGY

## 2020-09-25 PROCEDURE — 3008F BODY MASS INDEX DOCD: CPT | Mod: S$GLB,,, | Performed by: ALLERGY & IMMUNOLOGY

## 2020-09-25 NOTE — PROGRESS NOTES
"Subjective:       Patient ID: Kaia Nelson is a 59 y.o. female.    Chief Complaint: Wheezing (follow up from last telemed visit for small airway dis. Doing well. Still using spiriva and flovent but only once daily.)    Pt has small airway disease now with cough that is improved   Pt states cough is better with spiriva   Tx: at her last visit, started on flovent, spiriva   Using 2 puffs of spiriva in the morning, montelukast 10 mg po qday- no change in mental status.   tx: asmanex 2 bid was changed to flovent 220 2 puffs qhs . symbicort sample 80 2 puffs bid given. Not much difference with the symbicort 2 puffs BID 80 mcg.   Rescue inhaler: ventolin   briana frequ: not had to use.   Azelastine 1 spray per nare qam magic mouth wash - didn't help.   Saline: once per day at night.     Concern that going outside and start with her cough as her last exacerbation occurred while riding a bike.     Prior tx:   augmentin- 6/2019   Steroids 6/2019  azithromycin 10 day course and chest xray showed streakiness and concern for atypical mycoplasma and was tx x 10 days.         General: neg unexpected weight changes, fevers, chills, night sweats, malaise  HEENT: see hpi, Neg eye pain, vision changes, ear drainage, nose bleeds, throat tightness, sores in the mouth  CV: Neg chest pain, palpitations, swelling  Resp: see hpi, neg hemoptysis  GI: see hpi, neg dysphagia, night abdominal pain, reflux, chronic diarrhea, chronic constipation  Derm: See Hpi, neg new rash, neg flushing  Mu/sk: Neg joint pain, joint swelling   Psych: Neg anxiety  neuro: neg chronic headaches, muscle weakness  Endo: neg heat/cold intolerance, chronic fatigue     Objective:       Vitals:    09/25/20 0915   BP: 127/86   Pulse: 82   Temp: 97.2 °F (36.2 °C)   TempSrc: Temporal   SpO2: 97%   Weight: 83 kg (183 lb)   Height: 5' 5" (1.651 m)     Physical Exam      General: no acute distress, well developed well nourished   HEENT:   Head:normocephalic atraumatic  Eyes: " RAYSHAWN, EOMI, Neg injection, scleral icterus, or conjunctival papillary hypertrophy.  Ears: tm clear bilaterally, normal canal  Nose: 2-3+ inferior turbinates pink, neg nasal polyps            Mucosa: dryness            Septal irritation: dryness  OP: mucus membranes moist, - cobblestoning, - PND, neg erythema or lesions  Neck: supple, Full range of motion, neg lymphadenopathy  Chest: full respiratory excursion no abnormal chest abnormality  Resp: clear to ascultation bilaterally  CV: RRR, neg MRG, brisk capillary refill  Abdomen: BS+, non tender, non distended  Ext:  Neg clubbing, cyanosis, pitting edema  Skin: Neg rashes or lesions  Lymph: neg supraclavicular, axillary       Assessment:       1. Small airways disease    2. Non-seasonal allergic rhinitis due to pollen    3. Chronic cough        Plan:       Small airways disease    Non-seasonal allergic rhinitis due to pollen    Chronic cough          Small airway disease   - improved on current regimen  - continue flovent 2 puffs qhs.   - Continue spiriva 2 puffs daily   s/p 10 day course of azithro. - 12/2019  Not improved on steroids. 12/2019  flovent 2 puffs bid- in illness: if peak flow down, increase to 4 puffs bid x 2 weeks then back down to baseline dosing.   symbicort 80 2 puffs bid did not help.   magic mouthwash prn q 3-6 hours. Not helpful.   Chest radiograph showed streakiness and tx for atypical. 6/2019    Allergic rhinitis:  Discussed allergy shots and risk and benefit  Continue fluticasone, consider changing flonase to xhance , vs xlear, vs budesonide in saline rinse  Pt will let me know  For out side increase frequency of meds.     6/2019- Face was significantly swollen on the left- augmentin 875 mg BID, due to chills and fever feeling and how pale she is today will treat for flu like symptoms - 6/2019.     Follow up in 6-12 months, sooner if needed.                 Diana Corado M.D.  Allergy/Immunology  [facility]    [phone] phone  [phone] fax

## 2020-09-25 NOTE — PATIENT INSTRUCTIONS
Continue as current.     If flaring, start scheduled albuterol    Double flovent 4 weeks then back off    spiriva 2 puffs daily is already max.       About April 1st if doing well, try to stop the spiriva, or use 1 puff spiriva and then stop after 4 weeks.     May use spiriva as needed. flovent doesn't work that way. Must be consistent.     Continue montelukast daily.

## 2021-04-07 ENCOUNTER — OFFICE VISIT (OUTPATIENT)
Dept: FAMILY MEDICINE | Facility: CLINIC | Age: 61
End: 2021-04-07
Payer: COMMERCIAL

## 2021-04-07 VITALS
HEIGHT: 65 IN | BODY MASS INDEX: 28.87 KG/M2 | DIASTOLIC BLOOD PRESSURE: 62 MMHG | RESPIRATION RATE: 18 BRPM | WEIGHT: 173.31 LBS | OXYGEN SATURATION: 99 % | HEART RATE: 93 BPM | SYSTOLIC BLOOD PRESSURE: 118 MMHG

## 2021-04-07 DIAGNOSIS — J30.1 NON-SEASONAL ALLERGIC RHINITIS DUE TO POLLEN: ICD-10-CM

## 2021-04-07 DIAGNOSIS — R92.8 ABNORMAL MAMMOGRAM OF RIGHT BREAST: ICD-10-CM

## 2021-04-07 DIAGNOSIS — R73.01 IMPAIRED FASTING GLUCOSE: ICD-10-CM

## 2021-04-07 DIAGNOSIS — Z13.220 SCREENING, LIPID: ICD-10-CM

## 2021-04-07 DIAGNOSIS — Z00.00 ANNUAL PHYSICAL EXAM: Primary | ICD-10-CM

## 2021-04-07 PROCEDURE — 3008F BODY MASS INDEX DOCD: CPT | Mod: S$GLB,,, | Performed by: INTERNAL MEDICINE

## 2021-04-07 PROCEDURE — 3008F PR BODY MASS INDEX (BMI) DOCUMENTED: ICD-10-PCS | Mod: S$GLB,,, | Performed by: INTERNAL MEDICINE

## 2021-04-07 PROCEDURE — 99396 PR PREVENTIVE VISIT,EST,40-64: ICD-10-PCS | Mod: S$GLB,,, | Performed by: INTERNAL MEDICINE

## 2021-04-07 PROCEDURE — 99396 PREV VISIT EST AGE 40-64: CPT | Mod: S$GLB,,, | Performed by: INTERNAL MEDICINE

## 2021-04-07 RX ORDER — SPIRONOLACTONE 50 MG/1
TABLET, FILM COATED ORAL
COMMUNITY
Start: 2021-01-07 | End: 2023-04-30

## 2021-04-09 ENCOUNTER — LAB VISIT (OUTPATIENT)
Dept: LAB | Facility: HOSPITAL | Age: 61
End: 2021-04-09
Attending: INTERNAL MEDICINE
Payer: COMMERCIAL

## 2021-04-09 DIAGNOSIS — R73.01 IMPAIRED FASTING GLUCOSE: ICD-10-CM

## 2021-04-09 DIAGNOSIS — Z00.00 ANNUAL PHYSICAL EXAM: ICD-10-CM

## 2021-04-09 DIAGNOSIS — L64.9 MALE PATTERN ALOPECIA: Primary | ICD-10-CM

## 2021-04-09 DIAGNOSIS — Z79.899 ENCOUNTER FOR LONG-TERM (CURRENT) USE OF OTHER MEDICATIONS: ICD-10-CM

## 2021-04-09 DIAGNOSIS — Z13.220 SCREENING, LIPID: ICD-10-CM

## 2021-04-09 LAB
ALBUMIN SERPL BCP-MCNC: 4.2 G/DL (ref 3.5–5.2)
ALBUMIN SERPL BCP-MCNC: 4.2 G/DL (ref 3.5–5.2)
ALP SERPL-CCNC: 49 U/L (ref 55–135)
ALP SERPL-CCNC: 49 U/L (ref 55–135)
ALT SERPL W/O P-5'-P-CCNC: 12 U/L (ref 10–44)
ALT SERPL W/O P-5'-P-CCNC: 12 U/L (ref 10–44)
ANION GAP SERPL CALC-SCNC: 2 MMOL/L (ref 8–16)
ANION GAP SERPL CALC-SCNC: 2 MMOL/L (ref 8–16)
AST SERPL-CCNC: 13 U/L (ref 10–40)
AST SERPL-CCNC: 13 U/L (ref 10–40)
BILIRUB SERPL-MCNC: 0.8 MG/DL (ref 0.1–1)
BILIRUB SERPL-MCNC: 0.8 MG/DL (ref 0.1–1)
BUN SERPL-MCNC: 15 MG/DL (ref 6–20)
BUN SERPL-MCNC: 15 MG/DL (ref 6–20)
CALCIUM SERPL-MCNC: 8.9 MG/DL (ref 8.7–10.5)
CALCIUM SERPL-MCNC: 8.9 MG/DL (ref 8.7–10.5)
CHLORIDE SERPL-SCNC: 108 MMOL/L (ref 95–110)
CHLORIDE SERPL-SCNC: 108 MMOL/L (ref 95–110)
CHOLEST SERPL-MCNC: 213 MG/DL (ref 120–199)
CHOLEST/HDLC SERPL: 4.8 {RATIO} (ref 2–5)
CO2 SERPL-SCNC: 28 MMOL/L (ref 23–29)
CO2 SERPL-SCNC: 28 MMOL/L (ref 23–29)
CREAT SERPL-MCNC: 0.7 MG/DL (ref 0.5–1.4)
CREAT SERPL-MCNC: 0.7 MG/DL (ref 0.5–1.4)
EST. GFR  (AFRICAN AMERICAN): >60 ML/MIN/1.73 M^2
EST. GFR  (AFRICAN AMERICAN): >60 ML/MIN/1.73 M^2
EST. GFR  (NON AFRICAN AMERICAN): >60 ML/MIN/1.73 M^2
EST. GFR  (NON AFRICAN AMERICAN): >60 ML/MIN/1.73 M^2
ESTIMATED AVG GLUCOSE: 123 MG/DL (ref 68–131)
GLUCOSE SERPL-MCNC: 100 MG/DL (ref 70–110)
GLUCOSE SERPL-MCNC: 100 MG/DL (ref 70–110)
HBA1C MFR BLD: 5.9 % (ref 4.5–6.2)
HDLC SERPL-MCNC: 44 MG/DL (ref 40–75)
HDLC SERPL: 20.7 % (ref 20–50)
LDLC SERPL CALC-MCNC: 148.8 MG/DL (ref 63–159)
NONHDLC SERPL-MCNC: 169 MG/DL
POTASSIUM SERPL-SCNC: 4.4 MMOL/L (ref 3.5–5.1)
POTASSIUM SERPL-SCNC: 4.4 MMOL/L (ref 3.5–5.1)
PROT SERPL-MCNC: 7 G/DL (ref 6–8.4)
PROT SERPL-MCNC: 7 G/DL (ref 6–8.4)
SODIUM SERPL-SCNC: 138 MMOL/L (ref 136–145)
SODIUM SERPL-SCNC: 138 MMOL/L (ref 136–145)
TRIGL SERPL-MCNC: 101 MG/DL (ref 30–150)

## 2021-04-09 PROCEDURE — 36415 COLL VENOUS BLD VENIPUNCTURE: CPT | Performed by: INTERNAL MEDICINE

## 2021-04-09 PROCEDURE — 83036 HEMOGLOBIN GLYCOSYLATED A1C: CPT | Performed by: INTERNAL MEDICINE

## 2021-04-09 PROCEDURE — 80061 LIPID PANEL: CPT | Performed by: INTERNAL MEDICINE

## 2021-04-09 PROCEDURE — 80053 COMPREHEN METABOLIC PANEL: CPT | Performed by: INTERNAL MEDICINE

## 2021-04-21 ENCOUNTER — OFFICE VISIT (OUTPATIENT)
Dept: OPTOMETRY | Facility: CLINIC | Age: 61
End: 2021-04-21
Payer: COMMERCIAL

## 2021-04-21 DIAGNOSIS — H52.7 REFRACTIVE ERROR: ICD-10-CM

## 2021-04-21 DIAGNOSIS — H25.13 NUCLEAR SCLEROSIS, BILATERAL: Primary | ICD-10-CM

## 2021-04-21 DIAGNOSIS — H26.9 CORTICAL CATARACT: ICD-10-CM

## 2021-04-21 PROCEDURE — 1126F PR PAIN SEVERITY QUANTIFIED, NO PAIN PRESENT: ICD-10-PCS | Mod: S$GLB,,, | Performed by: OPTOMETRIST

## 2021-04-21 PROCEDURE — 92004 COMPRE OPH EXAM NEW PT 1/>: CPT | Mod: S$GLB,,, | Performed by: OPTOMETRIST

## 2021-04-21 PROCEDURE — 92004 PR EYE EXAM, NEW PATIENT,COMPREHESV: ICD-10-PCS | Mod: S$GLB,,, | Performed by: OPTOMETRIST

## 2021-04-21 PROCEDURE — 99999 PR PBB SHADOW E&M-EST. PATIENT-LVL III: CPT | Mod: PBBFAC,,, | Performed by: OPTOMETRIST

## 2021-04-21 PROCEDURE — 99999 PR PBB SHADOW E&M-EST. PATIENT-LVL III: ICD-10-PCS | Mod: PBBFAC,,, | Performed by: OPTOMETRIST

## 2021-04-21 PROCEDURE — 1126F AMNT PAIN NOTED NONE PRSNT: CPT | Mod: S$GLB,,, | Performed by: OPTOMETRIST

## 2021-04-21 RX ORDER — DUTASTERIDE 0.5 MG/1
0.5 CAPSULE, LIQUID FILLED ORAL DAILY
COMMUNITY
Start: 2021-04-08

## 2021-05-21 ENCOUNTER — HOSPITAL ENCOUNTER (OUTPATIENT)
Dept: RADIOLOGY | Facility: HOSPITAL | Age: 61
Discharge: HOME | End: 2021-05-21
Attending: INTERNAL MEDICINE
Payer: COMMERCIAL

## 2021-05-21 DIAGNOSIS — R92.8 ABNORMAL MAMMOGRAM OF RIGHT BREAST: ICD-10-CM

## 2021-05-21 PROCEDURE — 77066 DX MAMMO INCL CAD BI: CPT | Mod: TC,PO

## 2021-05-21 PROCEDURE — 76642 ULTRASOUND BREAST LIMITED: CPT | Mod: TC,PO,LT

## 2021-06-03 ENCOUNTER — HOSPITAL ENCOUNTER (OUTPATIENT)
Dept: RADIOLOGY | Facility: HOSPITAL | Age: 61
Discharge: HOME | End: 2021-06-03
Attending: INTERNAL MEDICINE
Payer: COMMERCIAL

## 2021-06-03 ENCOUNTER — OFFICE VISIT (OUTPATIENT)
Dept: SURGERY | Facility: CLINIC | Age: 61
End: 2021-06-03
Payer: COMMERCIAL

## 2021-06-03 VITALS
HEART RATE: 63 BPM | SYSTOLIC BLOOD PRESSURE: 129 MMHG | WEIGHT: 170 LBS | DIASTOLIC BLOOD PRESSURE: 79 MMHG | BODY MASS INDEX: 28.32 KG/M2 | HEIGHT: 65 IN | TEMPERATURE: 97 F

## 2021-06-03 DIAGNOSIS — R92.8 ABNORMAL MAMMOGRAM OF LEFT BREAST: Primary | ICD-10-CM

## 2021-06-03 DIAGNOSIS — R92.8 ABNORMAL MAMMOGRAM: ICD-10-CM

## 2021-06-03 PROCEDURE — 1126F PR PAIN SEVERITY QUANTIFIED, NO PAIN PRESENT: ICD-10-PCS | Mod: S$GLB,,, | Performed by: PHYSICIAN ASSISTANT

## 2021-06-03 PROCEDURE — 1126F AMNT PAIN NOTED NONE PRSNT: CPT | Mod: S$GLB,,, | Performed by: PHYSICIAN ASSISTANT

## 2021-06-03 PROCEDURE — 3008F BODY MASS INDEX DOCD: CPT | Mod: CPTII,S$GLB,, | Performed by: PHYSICIAN ASSISTANT

## 2021-06-03 PROCEDURE — 99203 PR OFFICE/OUTPT VISIT, NEW, LEVL III, 30-44 MIN: ICD-10-PCS | Mod: S$GLB,,, | Performed by: PHYSICIAN ASSISTANT

## 2021-06-03 PROCEDURE — 3008F PR BODY MASS INDEX (BMI) DOCUMENTED: ICD-10-PCS | Mod: CPTII,S$GLB,, | Performed by: PHYSICIAN ASSISTANT

## 2021-06-03 PROCEDURE — 99203 OFFICE O/P NEW LOW 30 MIN: CPT | Mod: S$GLB,,, | Performed by: PHYSICIAN ASSISTANT

## 2021-06-03 PROCEDURE — A4215 STERILE NEEDLE: HCPCS | Mod: PO

## 2021-09-28 ENCOUNTER — OFFICE VISIT (OUTPATIENT)
Dept: ALLERGY | Facility: CLINIC | Age: 61
End: 2021-09-28
Payer: COMMERCIAL

## 2021-09-28 VITALS
BODY MASS INDEX: 27.83 KG/M2 | HEIGHT: 66 IN | TEMPERATURE: 97 F | SYSTOLIC BLOOD PRESSURE: 126 MMHG | DIASTOLIC BLOOD PRESSURE: 82 MMHG | WEIGHT: 173.19 LBS | OXYGEN SATURATION: 98 % | HEART RATE: 78 BPM

## 2021-09-28 DIAGNOSIS — J30.1 NON-SEASONAL ALLERGIC RHINITIS DUE TO POLLEN: ICD-10-CM

## 2021-09-28 DIAGNOSIS — J98.4 SMALL AIRWAYS DISEASE: ICD-10-CM

## 2021-09-28 PROCEDURE — 1160F PR REVIEW ALL MEDS BY PRESCRIBER/CLIN PHARMACIST DOCUMENTED: ICD-10-PCS | Mod: S$GLB,,, | Performed by: ALLERGY & IMMUNOLOGY

## 2021-09-28 PROCEDURE — 99213 PR OFFICE/OUTPT VISIT, EST, LEVL III, 20-29 MIN: ICD-10-PCS | Mod: S$GLB,,, | Performed by: ALLERGY & IMMUNOLOGY

## 2021-09-28 PROCEDURE — 99213 OFFICE O/P EST LOW 20 MIN: CPT | Mod: S$GLB,,, | Performed by: ALLERGY & IMMUNOLOGY

## 2021-09-28 PROCEDURE — 3044F PR MOST RECENT HEMOGLOBIN A1C LEVEL <7.0%: ICD-10-PCS | Mod: S$GLB,,, | Performed by: ALLERGY & IMMUNOLOGY

## 2021-09-28 PROCEDURE — 3079F PR MOST RECENT DIASTOLIC BLOOD PRESSURE 80-89 MM HG: ICD-10-PCS | Mod: S$GLB,,, | Performed by: ALLERGY & IMMUNOLOGY

## 2021-09-28 PROCEDURE — 1160F RVW MEDS BY RX/DR IN RCRD: CPT | Mod: S$GLB,,, | Performed by: ALLERGY & IMMUNOLOGY

## 2021-09-28 PROCEDURE — 3074F PR MOST RECENT SYSTOLIC BLOOD PRESSURE < 130 MM HG: ICD-10-PCS | Mod: S$GLB,,, | Performed by: ALLERGY & IMMUNOLOGY

## 2021-09-28 PROCEDURE — 3008F BODY MASS INDEX DOCD: CPT | Mod: S$GLB,,, | Performed by: ALLERGY & IMMUNOLOGY

## 2021-09-28 PROCEDURE — 3044F HG A1C LEVEL LT 7.0%: CPT | Mod: S$GLB,,, | Performed by: ALLERGY & IMMUNOLOGY

## 2021-09-28 PROCEDURE — 3008F PR BODY MASS INDEX (BMI) DOCUMENTED: ICD-10-PCS | Mod: S$GLB,,, | Performed by: ALLERGY & IMMUNOLOGY

## 2021-09-28 PROCEDURE — 3074F SYST BP LT 130 MM HG: CPT | Mod: S$GLB,,, | Performed by: ALLERGY & IMMUNOLOGY

## 2021-09-28 PROCEDURE — 3079F DIAST BP 80-89 MM HG: CPT | Mod: S$GLB,,, | Performed by: ALLERGY & IMMUNOLOGY

## 2021-09-28 RX ORDER — MONTELUKAST SODIUM 10 MG/1
10 TABLET ORAL DAILY
Qty: 90 TABLET | Refills: 4 | Status: SHIPPED | OUTPATIENT
Start: 2021-09-28 | End: 2022-09-27 | Stop reason: SDUPTHER

## 2021-09-28 RX ORDER — ALBUTEROL SULFATE 90 UG/1
2 AEROSOL, METERED RESPIRATORY (INHALATION) EVERY 6 HOURS PRN
Qty: 18 G | Refills: 3 | Status: SHIPPED | OUTPATIENT
Start: 2021-09-28 | End: 2022-09-27 | Stop reason: SDUPTHER

## 2021-09-28 RX ORDER — FLUTICASONE PROPIONATE 220 UG/1
AEROSOL, METERED RESPIRATORY (INHALATION)
Qty: 36 G | Refills: 4 | Status: SHIPPED | OUTPATIENT
Start: 2021-09-28 | End: 2021-10-04

## 2021-09-28 RX ORDER — FLUTICASONE PROPIONATE 50 MCG
2 SPRAY, SUSPENSION (ML) NASAL 2 TIMES DAILY
Qty: 48 G | Refills: 4 | Status: SHIPPED | OUTPATIENT
Start: 2021-09-28 | End: 2022-09-27 | Stop reason: SDUPTHER

## 2021-09-28 RX ORDER — AZELASTINE 1 MG/ML
SPRAY, METERED NASAL
Qty: 90 ML | Refills: 4 | Status: SHIPPED | OUTPATIENT
Start: 2021-09-28 | End: 2022-09-27 | Stop reason: SDUPTHER

## 2022-02-06 ENCOUNTER — PATIENT MESSAGE (OUTPATIENT)
Dept: ALLERGY | Facility: CLINIC | Age: 62
End: 2022-02-06
Payer: COMMERCIAL

## 2022-02-06 DIAGNOSIS — J98.4 SMALL AIRWAYS DISEASE: ICD-10-CM

## 2022-02-07 RX ORDER — FLUTICASONE PROPIONATE 220 UG/1
AEROSOL, METERED RESPIRATORY (INHALATION)
Qty: 36 G | Refills: 2 | Status: SHIPPED | OUTPATIENT
Start: 2022-02-07 | End: 2022-09-27 | Stop reason: SDUPTHER

## 2022-09-27 ENCOUNTER — OFFICE VISIT (OUTPATIENT)
Dept: ALLERGY | Facility: CLINIC | Age: 62
End: 2022-09-27
Payer: COMMERCIAL

## 2022-09-27 VITALS
TEMPERATURE: 97 F | HEART RATE: 83 BPM | SYSTOLIC BLOOD PRESSURE: 120 MMHG | WEIGHT: 176 LBS | DIASTOLIC BLOOD PRESSURE: 80 MMHG | HEIGHT: 66 IN | OXYGEN SATURATION: 98 % | BODY MASS INDEX: 28.28 KG/M2

## 2022-09-27 DIAGNOSIS — J30.1 NON-SEASONAL ALLERGIC RHINITIS DUE TO POLLEN: ICD-10-CM

## 2022-09-27 DIAGNOSIS — B37.0 THRUSH: Primary | ICD-10-CM

## 2022-09-27 DIAGNOSIS — J98.4 SMALL AIRWAYS DISEASE: ICD-10-CM

## 2022-09-27 PROCEDURE — 3074F PR MOST RECENT SYSTOLIC BLOOD PRESSURE < 130 MM HG: ICD-10-PCS | Mod: CPTII,S$GLB,, | Performed by: ALLERGY & IMMUNOLOGY

## 2022-09-27 PROCEDURE — 1159F MED LIST DOCD IN RCRD: CPT | Mod: CPTII,S$GLB,, | Performed by: ALLERGY & IMMUNOLOGY

## 2022-09-27 PROCEDURE — 1160F RVW MEDS BY RX/DR IN RCRD: CPT | Mod: CPTII,S$GLB,, | Performed by: ALLERGY & IMMUNOLOGY

## 2022-09-27 PROCEDURE — 1160F PR REVIEW ALL MEDS BY PRESCRIBER/CLIN PHARMACIST DOCUMENTED: ICD-10-PCS | Mod: CPTII,S$GLB,, | Performed by: ALLERGY & IMMUNOLOGY

## 2022-09-27 PROCEDURE — 99213 PR OFFICE/OUTPT VISIT, EST, LEVL III, 20-29 MIN: ICD-10-PCS | Mod: S$GLB,,, | Performed by: ALLERGY & IMMUNOLOGY

## 2022-09-27 PROCEDURE — 99213 OFFICE O/P EST LOW 20 MIN: CPT | Mod: S$GLB,,, | Performed by: ALLERGY & IMMUNOLOGY

## 2022-09-27 PROCEDURE — 3079F DIAST BP 80-89 MM HG: CPT | Mod: CPTII,S$GLB,, | Performed by: ALLERGY & IMMUNOLOGY

## 2022-09-27 PROCEDURE — 3008F BODY MASS INDEX DOCD: CPT | Mod: CPTII,S$GLB,, | Performed by: ALLERGY & IMMUNOLOGY

## 2022-09-27 PROCEDURE — 3079F PR MOST RECENT DIASTOLIC BLOOD PRESSURE 80-89 MM HG: ICD-10-PCS | Mod: CPTII,S$GLB,, | Performed by: ALLERGY & IMMUNOLOGY

## 2022-09-27 PROCEDURE — 1159F PR MEDICATION LIST DOCUMENTED IN MEDICAL RECORD: ICD-10-PCS | Mod: CPTII,S$GLB,, | Performed by: ALLERGY & IMMUNOLOGY

## 2022-09-27 PROCEDURE — 3074F SYST BP LT 130 MM HG: CPT | Mod: CPTII,S$GLB,, | Performed by: ALLERGY & IMMUNOLOGY

## 2022-09-27 PROCEDURE — 3008F PR BODY MASS INDEX (BMI) DOCUMENTED: ICD-10-PCS | Mod: CPTII,S$GLB,, | Performed by: ALLERGY & IMMUNOLOGY

## 2022-09-27 RX ORDER — AZELASTINE 1 MG/ML
SPRAY, METERED NASAL
Qty: 90 ML | Refills: 4 | Status: SHIPPED | OUTPATIENT
Start: 2022-09-27 | End: 2023-09-27 | Stop reason: SDUPTHER

## 2022-09-27 RX ORDER — MONTELUKAST SODIUM 10 MG/1
10 TABLET ORAL DAILY
Qty: 90 TABLET | Refills: 4 | Status: SHIPPED | OUTPATIENT
Start: 2022-09-27 | End: 2023-09-27 | Stop reason: SDUPTHER

## 2022-09-27 RX ORDER — ALBUTEROL SULFATE 90 UG/1
2 AEROSOL, METERED RESPIRATORY (INHALATION) EVERY 6 HOURS PRN
Qty: 36 G | Refills: 3 | Status: SHIPPED | OUTPATIENT
Start: 2022-09-27 | End: 2023-06-02 | Stop reason: ALTCHOICE

## 2022-09-27 RX ORDER — FLUTICASONE PROPIONATE 50 MCG
2 SPRAY, SUSPENSION (ML) NASAL 2 TIMES DAILY
Qty: 48 G | Refills: 4 | Status: SHIPPED | OUTPATIENT
Start: 2022-09-27

## 2022-09-27 RX ORDER — NYSTATIN 100000 [USP'U]/ML
4 SUSPENSION ORAL 4 TIMES DAILY
Qty: 224 ML | Refills: 2 | Status: SHIPPED | OUTPATIENT
Start: 2022-09-27 | End: 2022-10-11

## 2022-09-27 RX ORDER — LIFITEGRAST 50 MG/ML
1 SOLUTION/ DROPS OPHTHALMIC 2 TIMES DAILY
COMMUNITY
Start: 2022-06-21 | End: 2023-04-30

## 2022-09-27 RX ORDER — FLUTICASONE PROPIONATE 220 UG/1
AEROSOL, METERED RESPIRATORY (INHALATION)
Qty: 36 G | Refills: 3 | Status: SHIPPED | OUTPATIENT
Start: 2022-09-27 | End: 2023-01-30 | Stop reason: SDUPTHER

## 2022-09-27 NOTE — PROGRESS NOTES
"Subjective:       Patient ID: Kaia Nelson is a 61 y.o. female.    Chief Complaint: Small airways disease    Pt has small airway disease now with cough that is improved     Her last visit 9/2021,  - proair, flovent 220 mcg, flonase, azelastine, montelkuast 10 mg po qday.       She has been well taking her medications.       Pt states cough is better with spiriva   Tx: at her last visit, started on flovent, spiriva   Using 2 puffs of spiriva in the morning, montelukast 10 mg po qday- no change in mental status.   tx: asmanex 2 bid was changed to flovent 220 2 puffs qhs . symbicort sample 80 2 puffs bid given. Not much difference with the symbicort 2 puffs BID 80 mcg.   Rescue inhaler: ventolin   briana frequ: not had to use.   Azelastine 1 spray per nare qam magic mouth wash - didn't help.   Saline: once per day at night.     Concern that going outside and start with her cough as her last exacerbation occurred while riding a bike.     Prior tx:   augmentin- 6/2019   Steroids 6/2019  azithromycin 10 day course and chest xray showed streakiness and concern for atypical mycoplasma and was tx x 10 days.         General: neg unexpected weight changes, fevers, chills, night sweats, malaise  HEENT: see hpi, Neg eye pain, vision changes, ear drainage, nose bleeds, throat tightness, sores in the mouth  CV: Neg chest pain, palpitations, swelling  Resp: see hpi, neg hemoptysis  GI: see hpi, neg dysphagia, night abdominal pain, reflux, chronic diarrhea, chronic constipation  Derm: See Hpi, neg new rash, neg flushing  Mu/sk: Neg joint pain, joint swelling   Psych: Neg anxiety  neuro: neg chronic headaches, muscle weakness  Endo: neg heat/cold intolerance, chronic fatigue     Objective:       Vitals:    09/27/22 0959   BP: 120/80   Pulse: 83   Temp: 97 °F (36.1 °C)   SpO2: 98%   Weight: 79.8 kg (176 lb)   Height: 5' 6" (1.676 m)     No peak flow due to pandemic     Physical Exam      General: no acute distress, well developed well " nourished   HEENT:   Head:normocephalic atraumatic  Eyes: RAYSHAWN, EOMI, Neg injection, scleral icterus, or conjunctival papillary hypertrophy.  Ears: tm clear bilaterally, normal canal  Nose: 2-3+ inferior turbinates pink, neg nasal polyps            Mucosa: dryness            Septal irritation: dryness  OP: mucus membranes moist, - cobblestoning, - PND, neg erythema, pin point thrush lesions on palatal arches.   Neck: supple, Full range of motion, neg lymphadenopathy  Chest: full respiratory excursion no abnormal chest abnormality  Resp: clear to ascultation bilaterally  CV: RRR, neg MRG, brisk capillary refill  Abdomen: BS+, non tender, non distended  Ext:  Neg clubbing, cyanosis, pitting edema  Skin: Neg rashes or lesions      Assessment:       1. Thrush    2. Non-seasonal allergic rhinitis due to pollen    3. Small airways disease          Plan:       Thrush  -     nystatin (MYCOSTATIN) 100,000 unit/mL suspension; Take 4 mLs (400,000 Units total) by mouth 4 (four) times daily. for 14 days  Dispense: 224 mL; Refill: 2    Non-seasonal allergic rhinitis due to pollen  -     azelastine (ASTELIN) 137 mcg (0.1 %) nasal spray; INSTILL ONE SPRAY INTO EACH NOSTRIL TWICE DAILY  Dispense: 90 mL; Refill: 4  -     montelukast (SINGULAIR) 10 mg tablet; Take 1 tablet (10 mg total) by mouth once daily.  Dispense: 90 tablet; Refill: 4  -     fluticasone propionate (FLONASE) 50 mcg/actuation nasal spray; 2 sprays (100 mcg total) by Each Nostril route 2 (two) times daily.  Dispense: 48 g; Refill: 4    Small airways disease  -     fluticasone propionate (FLOVENT HFA) 220 mcg/actuation inhaler; 2 puffs twice per day  Dispense: 36 g; Refill: 3  -     albuterol (PROAIR HFA) 90 mcg/actuation inhaler; Inhale 2 puffs into the lungs every 6 (six) hours as needed for Wheezing or Shortness of Breath (COUGH).  Dispense: 36 g; Refill: 3  -     fluticasone propionate (FLONASE) 50 mcg/actuation nasal spray; 2 sprays (100 mcg total) by Each  Nostril route 2 (two) times daily.  Dispense: 48 g; Refill: 4          Small airway disease   - improved on current regimen  - continue flovent 110 mcg 2 puffs qhs.   - Continue spiriva 2 puffs daily prn   No exacerbation in 12 months.     s/p 10 day course of azithro. - 12/2019  Not improved on steroids. 12/2019  flovent 2 puffs bid- in illness: if peak flow down, increase to 4 puffs bid x 2 weeks then back down to baseline dosing.   symbicort 80 2 puffs bid did not help.   magic mouthwash prn q 3-6 hours. Not helpful.   Chest radiograph showed streakiness and tx for atypical. 6/2019    Allergic rhinitis:  Continue flonase and azelastine prn   Discussed allergy shots and risk and benefit- declines at this time.   Continue fluticasone, consider changing flonase to xhance , vs xlear, vs budesonide in saline rinse  Pt will let me know  For out side increase frequency of meds.     6/2019- Face was significantly swollen on the left- augmentin 875 mg BID, due to chills and fever feeling and how pale she is today will treat for flu like symptoms - 6/2019.     Thursh   9/22:  Nystatin mouth qid   Use spacer.     Follow up in 12 months, sooner if needed.                 Diana Corado M.D.  Allergy/Immunology  [facility]   [phone] phone  [phone] fax

## 2022-09-27 NOTE — LETTER
September 27, 2022        Demetra Kiran MD  [address]  [place] [address]             [facility] - [facility]ergy  [address]  [address]  [place] [address]  Phone: [phone]  Fax: [phone]   Patient: Kaia Nelson   MR Number: [id number]   YOB: 1960   Date of Visit: 9/27/2022       Dear Dr. Kiran:    Thank you for referring Kaia Nelson to me for evaluation. Below are the relevant portions of my assessment and plan of care.            If you have questions, please do not hesitate to call me. I look forward to following Kaia along with you.    Sincerely,      Diana Corado MD           CC  No Recipients

## 2022-09-27 NOTE — PATIENT INSTRUCTIONS
Continue current plan.     Doing great !    Use spacer with inhaler.     Follow up in 12 months, sooner If needed.

## 2023-01-30 ENCOUNTER — PATIENT MESSAGE (OUTPATIENT)
Dept: ALLERGY | Facility: CLINIC | Age: 63
End: 2023-01-30

## 2023-01-30 DIAGNOSIS — J98.4 SMALL AIRWAYS DISEASE: ICD-10-CM

## 2023-01-30 RX ORDER — FLUTICASONE PROPIONATE 220 UG/1
AEROSOL, METERED RESPIRATORY (INHALATION)
Qty: 36 G | Refills: 3 | Status: SHIPPED | OUTPATIENT
Start: 2023-01-30

## 2023-04-30 ENCOUNTER — HOSPITAL ENCOUNTER (INPATIENT)
Facility: HOSPITAL | Age: 63
LOS: 2 days | Discharge: HOME | DRG: 440 | End: 2023-05-04
Attending: EMERGENCY MEDICINE | Admitting: INTERNAL MEDICINE
Payer: COMMERCIAL

## 2023-04-30 DIAGNOSIS — K85.90 ACUTE PANCREATITIS, UNSPECIFIED COMPLICATION STATUS, UNSPECIFIED PANCREATITIS TYPE: Primary | ICD-10-CM

## 2023-04-30 DIAGNOSIS — R07.9 CHEST PAIN: ICD-10-CM

## 2023-04-30 DIAGNOSIS — R10.13 EPIGASTRIC PAIN: ICD-10-CM

## 2023-04-30 LAB
ALBUMIN SERPL BCP-MCNC: 4.3 G/DL (ref 3.5–5.2)
ALP SERPL-CCNC: 59 U/L (ref 55–135)
ALT SERPL W/O P-5'-P-CCNC: 82 U/L (ref 10–44)
ANION GAP SERPL CALC-SCNC: 9 MMOL/L (ref 8–16)
AST SERPL-CCNC: 67 U/L (ref 10–40)
BASOPHILS # BLD AUTO: 0.03 K/UL (ref 0–0.2)
BASOPHILS NFR BLD: 0.2 % (ref 0–1.9)
BILIRUB SERPL-MCNC: 0.7 MG/DL (ref 0.1–1)
BILIRUB UR QL STRIP: NEGATIVE
BNP SERPL-MCNC: 55 PG/ML (ref 0–99)
BUN SERPL-MCNC: 15 MG/DL (ref 6–20)
CALCIUM SERPL-MCNC: 9.3 MG/DL (ref 8.7–10.5)
CHLORIDE SERPL-SCNC: 102 MMOL/L (ref 95–110)
CLARITY UR: CLEAR
CO2 SERPL-SCNC: 26 MMOL/L (ref 23–29)
COLOR UR: YELLOW
CREAT SERPL-MCNC: 0.6 MG/DL (ref 0.5–1.4)
DIFFERENTIAL METHOD: ABNORMAL
EOSINOPHIL # BLD AUTO: 0.1 K/UL (ref 0–0.5)
EOSINOPHIL NFR BLD: 0.5 % (ref 0–8)
ERYTHROCYTE [DISTWIDTH] IN BLOOD BY AUTOMATED COUNT: 13 % (ref 11.5–14.5)
EST. GFR  (NO RACE VARIABLE): >60 ML/MIN/1.73 M^2
GLUCOSE SERPL-MCNC: 111 MG/DL (ref 70–110)
GLUCOSE UR QL STRIP: NEGATIVE
HCT VFR BLD AUTO: 40.4 % (ref 37–48.5)
HGB BLD-MCNC: 13.6 G/DL (ref 12–16)
HGB UR QL STRIP: NEGATIVE
IMM GRANULOCYTES # BLD AUTO: 0.04 K/UL (ref 0–0.04)
IMM GRANULOCYTES NFR BLD AUTO: 0.3 % (ref 0–0.5)
INR PPP: 1 (ref 0.8–1.2)
KETONES UR QL STRIP: NEGATIVE
LEUKOCYTE ESTERASE UR QL STRIP: NEGATIVE
LIPASE SERPL-CCNC: 344 U/L (ref 4–60)
LYMPHOCYTES # BLD AUTO: 1.1 K/UL (ref 1–4.8)
LYMPHOCYTES NFR BLD: 8.4 % (ref 18–48)
MCH RBC QN AUTO: 29.3 PG (ref 27–31)
MCHC RBC AUTO-ENTMCNC: 33.7 G/DL (ref 32–36)
MCV RBC AUTO: 87 FL (ref 82–98)
MONOCYTES # BLD AUTO: 0.5 K/UL (ref 0.3–1)
MONOCYTES NFR BLD: 4.3 % (ref 4–15)
NEUTROPHILS # BLD AUTO: 10.9 K/UL (ref 1.8–7.7)
NEUTROPHILS NFR BLD: 86.3 % (ref 38–73)
NITRITE UR QL STRIP: NEGATIVE
NRBC BLD-RTO: 0 /100 WBC
PH UR STRIP: 8 [PH] (ref 5–8)
PLATELET # BLD AUTO: 286 K/UL (ref 150–450)
PMV BLD AUTO: 9.6 FL (ref 9.2–12.9)
POTASSIUM SERPL-SCNC: 3.8 MMOL/L (ref 3.5–5.1)
PROT SERPL-MCNC: 7.5 G/DL (ref 6–8.4)
PROT UR QL STRIP: NEGATIVE
PROTHROMBIN TIME: 10.8 SEC (ref 9–12.5)
RBC # BLD AUTO: 4.64 M/UL (ref 4–5.4)
SODIUM SERPL-SCNC: 137 MMOL/L (ref 136–145)
SP GR UR STRIP: 1.01 (ref 1–1.03)
TRIGL SERPL-MCNC: 60 MG/DL (ref 30–150)
TROPONIN I SERPL HS-MCNC: 2.7 PG/ML (ref 0–14.9)
TSH SERPL DL<=0.005 MIU/L-ACNC: 3.08 UIU/ML (ref 0.34–5.6)
URN SPEC COLLECT METH UR: NORMAL
UROBILINOGEN UR STRIP-ACNC: NEGATIVE EU/DL
WBC # BLD AUTO: 12.67 K/UL (ref 3.9–12.7)

## 2023-04-30 PROCEDURE — 80053 COMPREHEN METABOLIC PANEL: CPT | Performed by: EMERGENCY MEDICINE

## 2023-04-30 PROCEDURE — 93005 ELECTROCARDIOGRAM TRACING: CPT | Performed by: INTERNAL MEDICINE

## 2023-04-30 PROCEDURE — 85025 COMPLETE CBC W/AUTO DIFF WBC: CPT | Performed by: EMERGENCY MEDICINE

## 2023-04-30 PROCEDURE — G0378 HOSPITAL OBSERVATION PER HR: HCPCS

## 2023-04-30 PROCEDURE — 25000003 PHARM REV CODE 250: Performed by: EMERGENCY MEDICINE

## 2023-04-30 PROCEDURE — 63600175 PHARM REV CODE 636 W HCPCS: Performed by: NURSE PRACTITIONER

## 2023-04-30 PROCEDURE — 99285 EMERGENCY DEPT VISIT HI MDM: CPT | Mod: 25

## 2023-04-30 PROCEDURE — 83880 ASSAY OF NATRIURETIC PEPTIDE: CPT | Performed by: EMERGENCY MEDICINE

## 2023-04-30 PROCEDURE — 25000242 PHARM REV CODE 250 ALT 637 W/ HCPCS: Performed by: NURSE PRACTITIONER

## 2023-04-30 PROCEDURE — 96375 TX/PRO/DX INJ NEW DRUG ADDON: CPT

## 2023-04-30 PROCEDURE — 63600175 PHARM REV CODE 636 W HCPCS: Performed by: INTERNAL MEDICINE

## 2023-04-30 PROCEDURE — 25000003 PHARM REV CODE 250: Performed by: NURSE PRACTITIONER

## 2023-04-30 PROCEDURE — 96376 TX/PRO/DX INJ SAME DRUG ADON: CPT

## 2023-04-30 PROCEDURE — 96365 THER/PROPH/DIAG IV INF INIT: CPT

## 2023-04-30 PROCEDURE — 63600175 PHARM REV CODE 636 W HCPCS: Performed by: EMERGENCY MEDICINE

## 2023-04-30 PROCEDURE — 83690 ASSAY OF LIPASE: CPT | Performed by: EMERGENCY MEDICINE

## 2023-04-30 PROCEDURE — 85610 PROTHROMBIN TIME: CPT | Performed by: EMERGENCY MEDICINE

## 2023-04-30 PROCEDURE — 81003 URINALYSIS AUTO W/O SCOPE: CPT | Performed by: EMERGENCY MEDICINE

## 2023-04-30 PROCEDURE — 84478 ASSAY OF TRIGLYCERIDES: CPT | Performed by: EMERGENCY MEDICINE

## 2023-04-30 PROCEDURE — 93010 EKG 12-LEAD: ICD-10-PCS | Mod: ,,, | Performed by: INTERNAL MEDICINE

## 2023-04-30 PROCEDURE — 94761 N-INVAS EAR/PLS OXIMETRY MLT: CPT

## 2023-04-30 PROCEDURE — 84484 ASSAY OF TROPONIN QUANT: CPT | Performed by: EMERGENCY MEDICINE

## 2023-04-30 PROCEDURE — 36415 COLL VENOUS BLD VENIPUNCTURE: CPT | Performed by: EMERGENCY MEDICINE

## 2023-04-30 PROCEDURE — 25500020 PHARM REV CODE 255: Performed by: EMERGENCY MEDICINE

## 2023-04-30 PROCEDURE — 93010 ELECTROCARDIOGRAM REPORT: CPT | Mod: ,,, | Performed by: INTERNAL MEDICINE

## 2023-04-30 PROCEDURE — 96366 THER/PROPH/DIAG IV INF ADDON: CPT

## 2023-04-30 PROCEDURE — 63600175 PHARM REV CODE 636 W HCPCS: Performed by: HOSPITALIST

## 2023-04-30 PROCEDURE — 25000003 PHARM REV CODE 250: Performed by: HOSPITALIST

## 2023-04-30 PROCEDURE — 84443 ASSAY THYROID STIM HORMONE: CPT | Performed by: EMERGENCY MEDICINE

## 2023-04-30 RX ORDER — AZELASTINE 1 MG/ML
1 SPRAY, METERED NASAL 2 TIMES DAILY
Status: DISCONTINUED | OUTPATIENT
Start: 2023-04-30 | End: 2023-05-04 | Stop reason: HOSPADM

## 2023-04-30 RX ORDER — MONTELUKAST SODIUM 10 MG/1
10 TABLET ORAL DAILY
Status: DISCONTINUED | OUTPATIENT
Start: 2023-05-01 | End: 2023-04-30

## 2023-04-30 RX ORDER — ONDANSETRON 2 MG/ML
4 INJECTION INTRAMUSCULAR; INTRAVENOUS EVERY 6 HOURS PRN
Status: DISCONTINUED | OUTPATIENT
Start: 2023-04-30 | End: 2023-05-04 | Stop reason: HOSPADM

## 2023-04-30 RX ORDER — MORPHINE SULFATE 2 MG/ML
2 INJECTION, SOLUTION INTRAMUSCULAR; INTRAVENOUS
Status: DISCONTINUED | OUTPATIENT
Start: 2023-04-30 | End: 2023-04-30

## 2023-04-30 RX ORDER — TALC
6 POWDER (GRAM) TOPICAL NIGHTLY PRN
Status: DISCONTINUED | OUTPATIENT
Start: 2023-04-30 | End: 2023-05-04 | Stop reason: HOSPADM

## 2023-04-30 RX ORDER — MORPHINE SULFATE 2 MG/ML
2 INJECTION, SOLUTION INTRAMUSCULAR; INTRAVENOUS EVERY 4 HOURS PRN
Status: DISCONTINUED | OUTPATIENT
Start: 2023-04-30 | End: 2023-05-01

## 2023-04-30 RX ORDER — ACETAMINOPHEN 325 MG/1
650 TABLET ORAL EVERY 8 HOURS PRN
Status: DISCONTINUED | OUTPATIENT
Start: 2023-04-30 | End: 2023-05-04 | Stop reason: HOSPADM

## 2023-04-30 RX ORDER — CHOLECALCIFEROL (VITAMIN D3) 50 MCG
2000 TABLET ORAL DAILY
Status: DISCONTINUED | OUTPATIENT
Start: 2023-05-01 | End: 2023-05-04 | Stop reason: HOSPADM

## 2023-04-30 RX ORDER — FLUTICASONE PROPIONATE 50 MCG
2 SPRAY, SUSPENSION (ML) NASAL 2 TIMES DAILY
Status: DISCONTINUED | OUTPATIENT
Start: 2023-04-30 | End: 2023-05-04 | Stop reason: HOSPADM

## 2023-04-30 RX ORDER — SODIUM CHLORIDE, SODIUM LACTATE, POTASSIUM CHLORIDE, CALCIUM CHLORIDE 600; 310; 30; 20 MG/100ML; MG/100ML; MG/100ML; MG/100ML
INJECTION, SOLUTION INTRAVENOUS CONTINUOUS
Status: DISCONTINUED | OUTPATIENT
Start: 2023-04-30 | End: 2023-05-04 | Stop reason: HOSPADM

## 2023-04-30 RX ORDER — ALBUTEROL SULFATE 0.83 MG/ML
2.5 SOLUTION RESPIRATORY (INHALATION) EVERY 6 HOURS PRN
Status: DISCONTINUED | OUTPATIENT
Start: 2023-04-30 | End: 2023-05-04 | Stop reason: HOSPADM

## 2023-04-30 RX ORDER — DUTASTERIDE 0.5 MG/1
0.5 CAPSULE, LIQUID FILLED ORAL DAILY
Status: DISCONTINUED | OUTPATIENT
Start: 2023-05-01 | End: 2023-05-04 | Stop reason: HOSPADM

## 2023-04-30 RX ORDER — BUDESONIDE 0.5 MG/2ML
1 INHALANT ORAL EVERY 12 HOURS
Status: DISCONTINUED | OUTPATIENT
Start: 2023-04-30 | End: 2023-05-04 | Stop reason: HOSPADM

## 2023-04-30 RX ORDER — HYDROCODONE BITARTRATE AND ACETAMINOPHEN 5; 325 MG/1; MG/1
1 TABLET ORAL EVERY 4 HOURS PRN
Status: DISCONTINUED | OUTPATIENT
Start: 2023-04-30 | End: 2023-05-01

## 2023-04-30 RX ORDER — MINOXIDIL 2.5 MG/1
1.25 TABLET ORAL NIGHTLY
COMMUNITY
Start: 2023-03-23

## 2023-04-30 RX ORDER — SPIRONOLACTONE 100 MG/1
100 TABLET, FILM COATED ORAL DAILY
COMMUNITY
Start: 2023-04-27

## 2023-04-30 RX ORDER — SPIRONOLACTONE 50 MG/1
100 TABLET, FILM COATED ORAL DAILY
Status: DISCONTINUED | OUTPATIENT
Start: 2023-05-01 | End: 2023-05-04 | Stop reason: HOSPADM

## 2023-04-30 RX ORDER — ALBUTEROL SULFATE 90 UG/1
2 AEROSOL, METERED RESPIRATORY (INHALATION) EVERY 6 HOURS PRN
Status: DISCONTINUED | OUTPATIENT
Start: 2023-04-30 | End: 2023-04-30

## 2023-04-30 RX ORDER — MONTELUKAST SODIUM 10 MG/1
10 TABLET ORAL NIGHTLY
Status: DISCONTINUED | OUTPATIENT
Start: 2023-04-30 | End: 2023-05-04 | Stop reason: HOSPADM

## 2023-04-30 RX ORDER — POLYETHYLENE GLYCOL 3350 17 G/17G
17 POWDER, FOR SOLUTION ORAL DAILY
Status: DISCONTINUED | OUTPATIENT
Start: 2023-05-01 | End: 2023-05-04 | Stop reason: HOSPADM

## 2023-04-30 RX ORDER — SODIUM CHLORIDE 0.9 % (FLUSH) 0.9 %
10 SYRINGE (ML) INJECTION
Status: DISCONTINUED | OUTPATIENT
Start: 2023-04-30 | End: 2023-05-04 | Stop reason: HOSPADM

## 2023-04-30 RX ORDER — MINOXIDIL 2.5 MG/1
2.5 TABLET ORAL NIGHTLY
Status: DISCONTINUED | OUTPATIENT
Start: 2023-04-30 | End: 2023-05-04 | Stop reason: HOSPADM

## 2023-04-30 RX ORDER — MORPHINE SULFATE 2 MG/ML
2 INJECTION, SOLUTION INTRAMUSCULAR; INTRAVENOUS
Status: COMPLETED | OUTPATIENT
Start: 2023-04-30 | End: 2023-04-30

## 2023-04-30 RX ADMIN — SODIUM CHLORIDE 1000 ML: 0.9 INJECTION, SOLUTION INTRAVENOUS at 12:04

## 2023-04-30 RX ADMIN — FLUTICASONE PROPIONATE 100 MCG: 50 SPRAY, METERED NASAL at 09:04

## 2023-04-30 RX ADMIN — MORPHINE SULFATE 2 MG: 2 INJECTION, SOLUTION INTRAMUSCULAR; INTRAVENOUS at 04:04

## 2023-04-30 RX ADMIN — MINOXIDIL 2.5 MG: 2.5 TABLET ORAL at 09:04

## 2023-04-30 RX ADMIN — IOHEXOL 100 ML: 350 INJECTION, SOLUTION INTRAVENOUS at 11:04

## 2023-04-30 RX ADMIN — PIPERACILLIN AND TAZOBACTAM 4.5 G: 4; .5 INJECTION, POWDER, LYOPHILIZED, FOR SOLUTION INTRAVENOUS at 12:04

## 2023-04-30 RX ADMIN — MORPHINE SULFATE 2 MG: 2 INJECTION, SOLUTION INTRAMUSCULAR; INTRAVENOUS at 12:04

## 2023-04-30 RX ADMIN — PIPERACILLIN AND TAZOBACTAM 4.5 G: 4; .5 INJECTION, POWDER, LYOPHILIZED, FOR SOLUTION INTRAVENOUS at 09:04

## 2023-04-30 RX ADMIN — ONDANSETRON 4 MG: 2 INJECTION INTRAMUSCULAR; INTRAVENOUS at 07:04

## 2023-04-30 RX ADMIN — MONTELUKAST 10 MG: 10 TABLET, FILM COATED ORAL at 09:04

## 2023-04-30 RX ADMIN — SODIUM CHLORIDE 1000 ML: 0.9 INJECTION, SOLUTION INTRAVENOUS at 01:04

## 2023-04-30 RX ADMIN — SODIUM CHLORIDE, SODIUM LACTATE, POTASSIUM CHLORIDE, AND CALCIUM CHLORIDE: .6; .31; .03; .02 INJECTION, SOLUTION INTRAVENOUS at 07:04

## 2023-04-30 RX ADMIN — MORPHINE SULFATE 2 MG: 2 INJECTION, SOLUTION INTRAMUSCULAR; INTRAVENOUS at 09:04

## 2023-04-30 NOTE — ED PROVIDER NOTES
Encounter Date: 4/30/2023       History     Chief Complaint   Patient presents with    Chest Pain     Pt complains of sharp stabbing pain that started in the right side of her upper back radiating into her chest that started last night.      62-year-old female presents complaining of chest pain patient describes pain as sharp and stabbing patient reports that it started in her right shoulder blade and she also has epigastric discomfort patient reports pain is constant since this morning and experienced a little bit of it last night she also reports a similar episode a week ago which resolved spontaneously after going to bed, this did not resolve today.  Patient has a history of allergic rhinitis, patient reports otherwise she has no significant past medical history patient denies family history of coronary artery disease patient denies history of smoking or habitual alcohol use patient denies ever having any work done on her heart.  Patient has never had a stress test before.    Review of patient's allergies indicates:   Allergen Reactions    Benzonatate Other (See Comments)     dizziness     Past Medical History:   Diagnosis Date    Acute allergic rhinitis due to pollen     Allergy to dust     Chronic cough     Chronic rhinitis     Other pulmonary insufficiency, not elsewhere classified      No past surgical history on file.  Family History   Problem Relation Age of Onset    Asthma Mother     Emphysema Mother     Hypertension Mother     Diabetes Father     Kidney disease Father     Heart failure Maternal Grandmother     Hypertension Maternal Grandmother     Diabetes Maternal Grandfather     Heart failure Maternal Grandfather     Cancer Paternal Grandmother         ovarian     Social History     Tobacco Use    Smoking status: Never    Smokeless tobacco: Never   Substance Use Topics    Alcohol use: No    Drug use: No     Review of Systems   Constitutional:  Negative for fever.   HENT:  Negative for congestion,  rhinorrhea, sore throat and trouble swallowing.    Eyes:  Negative for visual disturbance.   Respiratory:  Negative for cough, chest tightness, shortness of breath and wheezing.    Cardiovascular:  Positive for chest pain. Negative for palpitations and leg swelling.   Gastrointestinal:  Positive for abdominal pain and nausea. Negative for abdominal distention, constipation, diarrhea and vomiting.   Genitourinary:  Negative for difficulty urinating, dysuria, flank pain and frequency.   Musculoskeletal:  Negative for arthralgias, back pain, joint swelling and neck pain.   Skin:  Negative for color change and rash.   Neurological:  Negative for dizziness, syncope, speech difficulty, weakness, numbness and headaches.   All other systems reviewed and are negative.    Physical Exam     Initial Vitals [04/30/23 1006]   BP Pulse Resp Temp SpO2   (!) 170/84 97 16 97.9 °F (36.6 °C) 97 %      MAP       --         Physical Exam    Nursing note and vitals reviewed.  Constitutional: She appears well-developed and well-nourished. She is not diaphoretic. No distress.   HENT:   Head: Normocephalic and atraumatic.   Right Ear: External ear normal.   Left Ear: External ear normal.   Nose: Nose normal.   Mouth/Throat: Oropharynx is clear and moist. No oropharyngeal exudate.   Eyes: Conjunctivae and EOM are normal. Pupils are equal, round, and reactive to light. Right eye exhibits no discharge. Left eye exhibits no discharge. No scleral icterus.   Neck: Neck supple. No thyromegaly present. No tracheal deviation present. No JVD present.   Normal range of motion.  Cardiovascular:  Normal rate, regular rhythm, normal heart sounds and intact distal pulses.     Exam reveals no gallop and no friction rub.       No murmur heard.  Pulmonary/Chest: Breath sounds normal. No stridor. No respiratory distress. She has no wheezes. She has no rhonchi. She has no rales. She exhibits no tenderness.   Abdominal: Abdomen is soft. Bowel sounds are normal.  She exhibits no distension and no mass. There is abdominal tenderness.   Mild epigastric tenderness There is no rebound and no guarding.   Musculoskeletal:         General: No tenderness or edema. Normal range of motion.      Cervical back: Normal range of motion and neck supple.     Lymphadenopathy:     She has no cervical adenopathy.   Neurological: She is alert and oriented to person, place, and time. She has normal strength. No cranial nerve deficit or sensory deficit.   Skin: Skin is warm and dry. No rash and no abscess noted. No erythema. No pallor.       ED Course   Procedures  Labs Reviewed   CBC W/ AUTO DIFFERENTIAL - Abnormal; Notable for the following components:       Result Value    Gran # (ANC) 10.9 (*)     Gran % 86.3 (*)     Lymph % 8.4 (*)     All other components within normal limits   COMPREHENSIVE METABOLIC PANEL - Abnormal; Notable for the following components:    Glucose 111 (*)     AST 67 (*)     ALT 82 (*)     All other components within normal limits   LIPASE - Abnormal; Notable for the following components:    Lipase 344 (*)     All other components within normal limits   B-TYPE NATRIURETIC PEPTIDE   TROPONIN I HIGH SENSITIVITY   TSH   PROTIME-INR   URINALYSIS, REFLEX TO URINE CULTURE    Narrative:     Specimen Source->Urine        ECG Results              EKG 12-lead (In process)  Result time 04/30/23 11:19:39      In process by Interface, Lab In [facility] (04/30/23 11:19:39)                   Narrative:    Test Reason : R07.9,    Vent. Rate : 076 BPM     Atrial Rate : 076 BPM     P-R Int : 176 ms          QRS Dur : 084 ms      QT Int : 372 ms       P-R-T Axes : 000 146 132 degrees     QTc Int : 418 ms    Normal sinus rhythm  Low voltage QRS  Left posterior fascicular block  Inferior infarct ,age undetermined  Abnormal ECG  No previous ECGs available    Referred By: [email or web address]   SELF           Confirmed By:                                   Imaging Results              CT Abdomen Pelvis With  Contrast (Final result)  Result time 04/30/23 12:27:39      Final result by Franco Chong Jr., MD (04/30/23 12:27:39)                   Narrative:    CT ABDOMEN PELVIS WITH IV CONTRAST:  4/30/2023 11:48 AM CDT    HISTORY:  Epigastric pain.    COMPARISON: Limited abdominal sonogram 4/30/2023    TECHNIQUE: Contrast-enhanced images of the abdomen and pelvis were obtained. Dose lowering techniques were utilized which include adjusting the mA and/or kV to protocol and/or patient size. The patient did not receive oral contrast. 100 mL of Omnipaque 350    ABDOMEN AND PELVIS:  LOWER CHEST: Cardiac chambers maintain normal size and overall configuration. No evidence of significant pericardial fluid.  LUNGS: Mild parenchymal scarring projecting over the diaphragmatic surfaces bilaterally appears more prominent towards the left. No evidence of definable pleural effusion. Small sliding-type hiatal hernia with small towards the stomach projecting above the level of the diaphragmatic hiatus.  LIVER:  Normal with no focal lesions.  No radiopaque calculi are seen in the gallbladder.  SPLEEN:  Normal.  PANCREAS:  Pancreas reveals significant fatty replacement. Marginal areas of peripancreatic stranding in the region of the pancreatic tail migrating downward along the region of the foramen of [place] and involving the second portion of the duodenum with minimal inflammatory stranding of the mesentery space at the root origin celiac artery. Findings are attributed towards uncomplicated pancreatitis with partial involvement reactive inflammatory changes of the second portion of duodenum.  ADRENAL GLANDS:  Normal.  KIDNEYS:  Normal.  GI TRACT:  Normal. The appendix is not seen.  AORTA / IVC:  The aorta and IVC are normal in caliber.  PROSTATE:  The prostate gland and seminal vesicles are unremarkable.  URINARY BLADDER:  Normal.  OTHER FINDINGS:  None.    LYMPH NODES:  Small benign-appearing retroperitoneal lymph nodes are  established. No evidence of global lymphadenopathy.    OSSEOUS STRUCTURES AND SOFT TISSUES:  Normal.    IMPRESSION:    1. Fatty atrophy of the pancreas evidence of fairly marginal zone of inflammatory stranding noted with the region of the pancreatic body and tail partial involvement of the second portion the duodenum with continued stranding in the central mesentery along the region origin the celiac plexus. There is no evidence of organized fluid collection or phlegmonous formations along the length of the inflammatory pancreas.  2. Dilated flaccid gallbladder without evidence of focal pathologic findings. Degree of polypoid focus along the peripheral aspect better defined on the accompanying sonographic evaluation.  3. Small sliding-type hiatus hernia.        Electronically signed by:  Franco Chong MD  4/30/2023 12:27 PM CDT Workstation: [id number]                                     US Abdomen Limited (Gallbladder) (Final result)  Result time 04/30/23 11:41:05      Final result by Franco Chong Jr., MD (04/30/23 11:41:05)                   Narrative:    Examination: Limited abdominal sonogram.    CLINICAL HISTORY: Abdominal pain.    COMPARISON: Correlation is made with patient's CT scan of the abdomen pelvis with contrast 4/30/2023.    TECHNIQUE: Real-time sonographic ablation of the patient's right upper quadrant was completed utilizing both gray scale and color flow imaging.    FINDINGS: Liver is normal in size measuring 15.7 cm in the midclavicular line maintaining uniform echogenicity pattern. There is no evidence of mass or cyst throughout the hepatic architecture. No distention of the intrahepatic biliary radicles. 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 Maximum wall thickness measures roughly 6 mm though apical margin measures at the apex measures 2 mm. There is no evidence of surrounding pericholecystic fluid. The right kidney measures 10.0 cm in thickness with normal outline. No evidence of hydronephrosis. No free fluid in the pelvis.    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 Further assessment based on the patient's clinical findings is advised. No evidence of acute cholecystitis by imaging inspection. Supplementary evaluation with nuclear medicine HIDA scan may provide additional diagnostic information.    Electronically signed by:  Franco Chong MD  4/30/2023 11:41 AM CDT Workstation: [id number]                                     X-Ray Chest AP Portable (Final result)  Result time 04/30/23 10:55:02      Final result by Franco Chong Jr., MD (04/30/23 10:55:02)                   Narrative:    CHEST X-RAY SINGLE VIEW    HISTORY: Chest pain    PREVIOUS STUDIES: None available    FINDINGS:  The heart size and pulmonary vascularity are within the range of normal.  There is no significant hilar nor mediastinal process.  The aerated lungs are well expanded and clear.  The right and left CP angles are rather sharp.  The osseous structures show nothing unusual.  There is  no detrimental change relative to previous.    IMPRESSION:   Negative chest. NO DETRIMENTAL CHANGE IN THE INTERVAL.    Electronically signed by:  Franco Chong MD  4/30/2023 10:55 AM CDT Workstation: [id number]                                     Medications   piperacillin-tazobactam 4.5 g in dextrose 5 % 100 mL IVPB (ready to mix system) (4.5 g Intravenous New Bag 4/30/23 1252)   sodium chloride 0.9% bolus 1,000 mL 1,000 mL (1,000 mLs Intravenous New Bag 4/30/23 1251)   sodium chloride 0.9% bolus 1,000 mL 1,000 mL (1,000 mLs  Intravenous New Bag 4/30/23 1321)   iohexoL (OMNIPAQUE 350) injection 100 mL (100 mLs Intravenous Given 4/30/23 1159)   morphine injection 2 mg (2 mg Intravenous Given 4/30/23 1252)     Medical Decision Making:   History:   Old Medical Records: I decided to obtain old medical records.  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    MDM    MDM    Patient presents for emergent evaluation of acute complaint that poses a possible threat to life and/or bodily function.    I may have ordered labs and personally reviewed them. If applicable, Labs significant for abnormalities noted above.    I may have ordered X-rays and personally reviewed them and reviewed the radiologist interpretation.  If applicable, Xray significant for findings noted above.    I may have ordered EKG and personally reviewed it.  If applicable, EKG significant for findings noted above.    I may have ordered CT scan and personally reviewed it and reviewed the radiologist interpretation.  If applicable, CT significant for findings noted above.      Admission MDM  I discussed the patient presentation labs, ekg, X-rays, CT findings (if applicable) with the consultant(s)   Patient was managed in the ED with IV labs, meds, fluids (if applicable).    The response to  treatment was noted with improved stabilization.    Patient required emergent consultation to Hospitalist for admission.    A dictation software program was used for this note.  Please expect some simple typographical  errors in this note.                      Clinical Impression:   Final diagnoses:  [R07.9] Chest pain  [R10.13] Epigastric pain  [K85.90] Acute pancreatitis, unspecified complication status, unspecified pancreatitis type (Primary)        ED Disposition Condition    Observation                 Dav Chadwick MD  04/30/23 [id number]

## 2023-04-30 NOTE — SUBJECTIVE & OBJECTIVE
Past Medical History:   Diagnosis Date    Acute allergic rhinitis due to pollen     Allergy to dust     Chronic cough     Chronic rhinitis     Other pulmonary insufficiency, not elsewhere classified        No past surgical history on file.    Review of patient's allergies indicates:   Allergen Reactions    Benzonatate Other (See Comments)     dizziness       No current facility-administered medications on file prior to encounter.     Current Outpatient Medications on File Prior to Encounter   Medication Sig    azelastine (ASTELIN) 137 mcg (0.1 %) nasal spray INSTILL ONE SPRAY INTO EACH NOSTRIL TWICE DAILY (Patient taking differently: 1 spray by Nasal route 2 (two) times daily. INSTILL ONE SPRAY INTO EACH NOSTRIL TWICE DAILY)    dutasteride (AVODART) 0.5 mg capsule Take 0.5 mg by mouth once daily.    fluticasone propionate (FLONASE) 50 mcg/actuation nasal spray 2 sprays (100 mcg total) by Each Nostril route 2 (two) times daily.    fluticasone propionate (FLOVENT HFA) 220 mcg/actuation inhaler 2 puffs twice per day (Patient taking differently: Inhale 2 puffs into the lungs 2 (two) times a day. 2 puffs twice per day)    minoxidiL (LONITEN) 2.5 MG tablet Take 1.25 mg by mouth every evening.    montelukast (SINGULAIR) 10 mg tablet Take 1 tablet (10 mg total) by mouth once daily.    spironolactone (ALDACTONE) 100 MG tablet Take 100 mg by mouth once daily.    vitamin D (VITAMIN D3) 1000 units Tab Take 2,000 Units by mouth once daily.    albuterol (PROAIR HFA) 90 mcg/actuation inhaler Inhale 2 puffs into the lungs every 6 (six) hours as needed for Wheezing or Shortness of Breath (COUGH).    [DISCONTINUED] inhalation spacing device (AEROCHAMBER PLUS Z STAT) Use as directed for inhalation. (Patient not taking: Reported on 9/27/2022)    [DISCONTINUED] minoxidil (LONITEN) 10 MG Tab Take 2.5 mg by mouth once daily.    [DISCONTINUED] spironolactone (ALDACTONE) 50 MG tablet TAKE 1 TABLET BY MOUTH DAILY FOR 2WEEKS THEN INCREASE TO  2 TABLETS DAILY AS TOLERATED    [DISCONTINUED] XIIDRA 5 % Dpet Place 1 drop into both eyes 2 (two) times daily.     Family History       Problem Relation (Age of Onset)    Asthma Mother    Cancer Paternal Grandmother    Diabetes Father, Maternal Grandfather    Emphysema Mother    Heart failure Maternal Grandmother, Maternal Grandfather    Hypertension Mother, Maternal Grandmother    Kidney disease Father          Tobacco Use    Smoking status: Never    Smokeless tobacco: Never   Substance and Sexual Activity    Alcohol use: No    Drug use: No    Sexual activity: Not on file     Review of Systems   HENT:  Negative for congestion and sore throat.    Eyes:  Negative for pain, discharge, redness and itching.   Respiratory:  Negative for cough, chest tightness, shortness of breath and wheezing.    Cardiovascular:  Negative for chest pain, palpitations and leg swelling.   Gastrointestinal:  Positive for abdominal pain and vomiting (2 episodes with severe pain). Negative for abdominal distention.   Genitourinary:  Negative for decreased urine volume, difficulty urinating, dysuria, flank pain, frequency, hematuria and urgency.   Musculoskeletal:  Negative for back pain, gait problem, joint swelling, myalgias and neck pain.        Pain radiates to right shoulder    Skin:  Negative for rash.   Neurological:  Negative for dizziness, syncope, speech difficulty, weakness, light-headedness, numbness and headaches.   Hematological:  Does not bruise/bleed easily.   Psychiatric/Behavioral:  Negative for agitation, confusion, hallucinations, self-injury, sleep disturbance and suicidal ideas. The patient is not nervous/anxious and is not hyperactive.    Objective:     Vital Signs (Most Recent):  Temp: 97.9 °F (36.6 °C) (04/30/23 1006)  Pulse: 67 (04/30/23 1100)  Resp: 16 (04/30/23 1252)  BP: 110/69 (04/30/23 1100)  SpO2: 99 % (04/30/23 1100) Vital Signs (24h Range):  Temp:  [97.9 °F (36.6 °C)] 97.9 °F (36.6 °C)  Pulse:  [67-97]  67  Resp:  [13-16] 16  SpO2:  [97 %-100 %] 99 %  BP: (110-170)/(69-84) 110/69     Weight: 80.7 kg (178 lb)  Body mass index is 28.73 kg/m².    Physical Exam  Vitals and nursing note reviewed.   Constitutional:       General: She is not in acute distress.     Appearance: Normal appearance. She is normal weight. She is not toxic-appearing.   HENT:      Head: Normocephalic.      Right Ear: Tympanic membrane, ear canal and external ear normal. There is no impacted cerumen.      Left Ear: Tympanic membrane, ear canal and external ear normal. There is no impacted cerumen.      Nose: Nose normal. No congestion or rhinorrhea.      Mouth/Throat:      Mouth: Mucous membranes are moist.      Pharynx: Oropharynx is clear. No oropharyngeal exudate or posterior oropharyngeal erythema.   Eyes:      General: No scleral icterus.        Right eye: No discharge.         Left eye: No discharge.      Extraocular Movements: Extraocular movements intact.      Conjunctiva/sclera: Conjunctivae normal.      Pupils: Pupils are equal, round, and reactive to light.   Neck:      Vascular: No carotid bruit.   Cardiovascular:      Rate and Rhythm: Normal rate and regular rhythm.      Pulses: Normal pulses.      Heart sounds: No murmur heard.    No friction rub. No gallop.   Pulmonary:      Effort: Pulmonary effort is normal. No respiratory distress.      Breath sounds: Normal breath sounds. No stridor. No wheezing, rhonchi or rales.   Chest:      Chest wall: No tenderness.   Abdominal:      General: Bowel sounds are normal. There is no distension.      Palpations: Abdomen is soft. There is no mass.      Tenderness: There is no abdominal tenderness. There is no right CVA tenderness, left CVA tenderness, guarding or rebound.      Hernia: No hernia is present.      Comments: No guarding or rebound tenderness but states the exam feels uncomfortable   Genitourinary:     Rectum: Normal.   Musculoskeletal:         General: No swelling, tenderness,  deformity or signs of injury. Normal range of motion.      Cervical back: Normal range of motion and neck supple. No rigidity or tenderness.      Right lower leg: No edema.      Left lower leg: No edema.   Lymphadenopathy:      Cervical: No cervical adenopathy.   Skin:     General: Skin is warm and dry.      Capillary Refill: Capillary refill takes 2 to 3 seconds.      Coloration: Skin is not jaundiced or pale.      Findings: No bruising, erythema, lesion or rash.   Neurological:      General: No focal deficit present.      Mental Status: She is alert and oriented to person, place, and time. Mental status is at baseline.      Cranial Nerves: No cranial nerve deficit.      Sensory: No sensory deficit.      Motor: No weakness.   Psychiatric:         Mood and Affect: Mood normal.         Behavior: Behavior normal.         Thought Content: Thought content normal.         Judgment: Judgment normal.         CRANIAL NERVES     CN III, IV, VI   Pupils are equal, round, and reactive to light.     Significant Labs: All pertinent labs within the past 24 hours have been reviewed.    Significant Imaging: I have reviewed all pertinent imaging results/findings within the past 24 hours.

## 2023-04-30 NOTE — H&P
Former[facility] - Emergency Dept  Hospital Medicine  History & Physical    Patient Name: Kaia Nelson  MRN: [id number]  Patient Class: OP- Observation  Admission Date: 4/30/2023  Attending Physician: Jason Rao MD   Primary Care Provider: Patricia Tse NP         Patient information was obtained from patient and ER records.     Subjective:     Principal Problem:Acute pancreatitis    Chief Complaint:   Chief Complaint   Patient presents with    Chest Pain     Pt complains of sharp stabbing pain that started in the right side of her upper back radiating into her chest that started last night.         HPI: 63 yo presents with c/o epigastric pain that radiates to right shoulder. Reports this pain presented 3 weeks ago while in [facility] after a book tour while walking to the car. Reports she started having sharp pain in the epigastric region and  radiated to the right shoulder. Reports the pain was severe but they drove back to [place] not wanting to go to a hospital out of state. Reports by the time they made it home the pain was not gone but slightly dull and she was able to go to bed and rest. Stating the pain has never completely gone away about a week ago the pain got sharp again but not as bas as the initial onset and lasted a couple of hours before getting dull again. The  works out of town and had been gone for 2 weeks and after getting home yesterday she still was not feeling well and he insisted they come to the hospital to be checked out. Reports 2 vomiting episodes with each sharp pain episode. Reports the severe pain has no correlations to food. Denies nausea and vomiting (other than the 2 episodes. No chest pain or shortness of breath and no positive cardiac history.        Past Medical History:   Diagnosis Date    Acute allergic rhinitis due to pollen     Allergy to dust     Chronic cough     Chronic rhinitis     Other pulmonary insufficiency, not elsewhere classified        No  past surgical history on file.    Review of patient's allergies indicates:   Allergen Reactions    Benzonatate Other (See Comments)     dizziness       No current facility-administered medications on file prior to encounter.     Current Outpatient Medications on File Prior to Encounter   Medication Sig    azelastine (ASTELIN) 137 mcg (0.1 %) nasal spray INSTILL ONE SPRAY INTO EACH NOSTRIL TWICE DAILY (Patient taking differently: 1 spray by Nasal route 2 (two) times daily. INSTILL ONE SPRAY INTO EACH NOSTRIL TWICE DAILY)    dutasteride (AVODART) 0.5 mg capsule Take 0.5 mg by mouth once daily.    fluticasone propionate (FLONASE) 50 mcg/actuation nasal spray 2 sprays (100 mcg total) by Each Nostril route 2 (two) times daily.    fluticasone propionate (FLOVENT HFA) 220 mcg/actuation inhaler 2 puffs twice per day (Patient taking differently: Inhale 2 puffs into the lungs 2 (two) times a day. 2 puffs twice per day)    minoxidiL (LONITEN) 2.5 MG tablet Take 1.25 mg by mouth every evening.    montelukast (SINGULAIR) 10 mg tablet Take 1 tablet (10 mg total) by mouth once daily.    spironolactone (ALDACTONE) 100 MG tablet Take 100 mg by mouth once daily.    vitamin D (VITAMIN D3) 1000 units Tab Take 2,000 Units by mouth once daily.    albuterol (PROAIR HFA) 90 mcg/actuation inhaler Inhale 2 puffs into the lungs every 6 (six) hours as needed for Wheezing or Shortness of Breath (COUGH).    [DISCONTINUED] inhalation spacing device (AEROCHAMBER PLUS Z STAT) Use as directed for inhalation. (Patient not taking: Reported on 9/27/2022)    [DISCONTINUED] minoxidil (LONITEN) 10 MG Tab Take 2.5 mg by mouth once daily.    [DISCONTINUED] spironolactone (ALDACTONE) 50 MG tablet TAKE 1 TABLET BY MOUTH DAILY FOR 2WEEKS THEN INCREASE TO 2 TABLETS DAILY AS TOLERATED    [DISCONTINUED] XIIDRA 5 % Dpet Place 1 drop into both eyes 2 (two) times daily.     Family History       Problem Relation (Age of Onset)    Asthma Mother    Cancer  Paternal Grandmother    Diabetes Father, Maternal Grandfather    Emphysema Mother    Heart failure Maternal Grandmother, Maternal Grandfather    Hypertension Mother, Maternal Grandmother    Kidney disease Father          Tobacco Use    Smoking status: Never    Smokeless tobacco: Never   Substance and Sexual Activity    Alcohol use: No    Drug use: No    Sexual activity: Not on file     Review of Systems   HENT:  Negative for congestion and sore throat.    Eyes:  Negative for pain, discharge, redness and itching.   Respiratory:  Negative for cough, chest tightness, shortness of breath and wheezing.    Cardiovascular:  Negative for chest pain, palpitations and leg swelling.   Gastrointestinal:  Positive for abdominal pain and vomiting (2 episodes with severe pain). Negative for abdominal distention.   Genitourinary:  Negative for decreased urine volume, difficulty urinating, dysuria, flank pain, frequency, hematuria and urgency.   Musculoskeletal:  Negative for back pain, gait problem, joint swelling, myalgias and neck pain.        Pain radiates to right shoulder    Skin:  Negative for rash.   Neurological:  Negative for dizziness, syncope, speech difficulty, weakness, light-headedness, numbness and headaches.   Hematological:  Does not bruise/bleed easily.   Psychiatric/Behavioral:  Negative for agitation, confusion, hallucinations, self-injury, sleep disturbance and suicidal ideas. The patient is not nervous/anxious and is not hyperactive.    Objective:     Vital Signs (Most Recent):  Temp: 97.9 °F (36.6 °C) (04/30/23 1006)  Pulse: 67 (04/30/23 1100)  Resp: 16 (04/30/23 1252)  BP: 110/69 (04/30/23 1100)  SpO2: 99 % (04/30/23 1100) Vital Signs (24h Range):  Temp:  [97.9 °F (36.6 °C)] 97.9 °F (36.6 °C)  Pulse:  [67-97] 67  Resp:  [13-16] 16  SpO2:  [97 %-100 %] 99 %  BP: (110-170)/(69-84) 110/69     Weight: 80.7 kg (178 lb)  Body mass index is 28.73 kg/m².    Physical Exam  Vitals and nursing note reviewed.    Constitutional:       General: She is not in acute distress.     Appearance: Normal appearance. She is normal weight. She is not toxic-appearing.   HENT:      Head: Normocephalic.      Right Ear: Tympanic membrane, ear canal and external ear normal. There is no impacted cerumen.      Left Ear: Tympanic membrane, ear canal and external ear normal. There is no impacted cerumen.      Nose: Nose normal. No congestion or rhinorrhea.      Mouth/Throat:      Mouth: Mucous membranes are moist.      Pharynx: Oropharynx is clear. No oropharyngeal exudate or posterior oropharyngeal erythema.   Eyes:      General: No scleral icterus.        Right eye: No discharge.         Left eye: No discharge.      Extraocular Movements: Extraocular movements intact.      Conjunctiva/sclera: Conjunctivae normal.      Pupils: Pupils are equal, round, and reactive to light.   Neck:      Vascular: No carotid bruit.   Cardiovascular:      Rate and Rhythm: Normal rate and regular rhythm.      Pulses: Normal pulses.      Heart sounds: No murmur heard.    No friction rub. No gallop.   Pulmonary:      Effort: Pulmonary effort is normal. No respiratory distress.      Breath sounds: Normal breath sounds. No stridor. No wheezing, rhonchi or rales.   Chest:      Chest wall: No tenderness.   Abdominal:      General: Bowel sounds are normal. There is no distension.      Palpations: Abdomen is soft. There is no mass.      Tenderness: There is no abdominal tenderness. There is no right CVA tenderness, left CVA tenderness, guarding or rebound.      Hernia: No hernia is present.      Comments: No guarding or rebound tenderness but states the exam feels uncomfortable   Genitourinary:     Rectum: Normal.   Musculoskeletal:         General: No swelling, tenderness, deformity or signs of injury. Normal range of motion.      Cervical back: Normal range of motion and neck supple. No rigidity or tenderness.      Right lower leg: No edema.      Left lower leg: No  edema.   Lymphadenopathy:      Cervical: No cervical adenopathy.   Skin:     General: Skin is warm and dry.      Capillary Refill: Capillary refill takes 2 to 3 seconds.      Coloration: Skin is not jaundiced or pale.      Findings: No bruising, erythema, lesion or rash.   Neurological:      General: No focal deficit present.      Mental Status: She is alert and oriented to person, place, and time. Mental status is at baseline.      Cranial Nerves: No cranial nerve deficit.      Sensory: No sensory deficit.      Motor: No weakness.   Psychiatric:         Mood and Affect: Mood normal.         Behavior: Behavior normal.         Thought Content: Thought content normal.         Judgment: Judgment normal.         CRANIAL NERVES     CN III, IV, VI   Pupils are equal, round, and reactive to light.     Significant Labs: All pertinent labs within the past 24 hours have been reviewed.    Significant Imaging: I have reviewed all pertinent imaging results/findings within the past 24 hours.    Assessment/Plan:     * Acute pancreatitis  Hydrate NS bolus X 2L  LR @125 continous drip  Pippercillin IV  Morphine for pain  GI consult          Epigastric pain  CT abdomen  CT chest  GB ultrasound  Lipase  CBC  CMP  Surgery consult        VTE Risk Mitigation (From admission, onward)    None               On 04/30/2023, patient should be placed in hospital observation services under my care in collaboration with Maira.      SEUN VILLEGAS  Department of Hospital Medicine  [facility] - Emergency Dept

## 2023-04-30 NOTE — ASSESSMENT & PLAN NOTE
Hydrate NS bolus X 2L  LR @125 continous drip  Pippercillin IV  Morphine for pain  GI consult

## 2023-04-30 NOTE — HPI
61 yo presents with c/o epigastric pain that radiates to right shoulder. Reports this pain presented 3 weeks ago while in [facility] after a book tour while walking to the car. Reports she started having sharp pain in the epigastric region and  radiated to the right shoulder. Reports the pain was severe but they drove back to [place] not wanting to go to a hospital out of state. Reports by the time they made it home the pain was not gone but slightly dull and she was able to go to bed and rest. Stating the pain has never completely gone away about a week ago the pain got sharp again but not as bas as the initial onset and lasted a couple of hours before getting dull again. The  works out of town and had been gone for 2 weeks and after getting home yesterday she still was not feeling well and he insisted they come to the hospital to be checked out. Reports 2 vomiting episodes with each sharp pain episode. Reports the severe pain has no correlations to food. Denies nausea and vomiting (other than the 2 episodes. No chest pain or shortness of breath and no positive cardiac history.

## 2023-04-30 NOTE — CONSULTS
GASTROENTEROLOGY INPATIENT CONSULT NOTE  Patient Name: Kaia Nelson  Patient MRN: [id number]  Patient : 1960    Admit Date: 2023  Service date: 2023    Reason for Consult: abdominal pain    PCP: Patricia Tse NP    Chief Complaint   Patient presents with    Chest Pain     Pt complains of sharp stabbing pain that started in the right side of her upper back radiating into her chest that started last night.        HPI: Patient is a 62 y.o. female admitted with acute on recurrent epigastric moderate sharp stabbing pain with radiation to right shoulder.  Patient reports symptoms off and on over the past several months with more significant episode 2 weeks ago.  Symptoms resolved after a few hours.  Yesterday patient developed return of symptoms and presents to the emergency department where CT imaging showed pancreatic atrophy and inflammation and abnormal gallbladder.  Lipase elevated..     CT abd    Pancreas reveals significant fatty replacement. Marginal areas of peripancreatic stranding in the region of the pancreatic tail migrating downward along the region of the foramen of Gilliam and involving the second portion of the duodenum with minimal inflammatory stranding of the mesentery space at the root origin celiac artery. Findings are attributed towards uncomplicated pancreatitis with partial involvement reactive inflammatory changes of the second portion of duodenum.    Past Medical History:  Past Medical History:   Diagnosis Date    Acute allergic rhinitis due to pollen     Allergy to dust     Chronic cough     Chronic rhinitis     Other pulmonary insufficiency, not elsewhere classified         Past Surgical History:  No past surgical history on file.     Home Medications:  (Not in a hospital admission)      Inpatient Medications:   piperacillin-tazobactam (ZOSYN) IVPB  4.5 g Intravenous Q8H    sodium chloride 0.9%  1,000 mL Intravenous ED 1 Time         Review of patient's allergies indicates:  "  Allergen Reactions    Benzonatate Other (See Comments)     dizziness       Social History:   Social History     Occupational History    Not on file   Tobacco Use    Smoking status: Never    Smokeless tobacco: Never   Substance and Sexual Activity    Alcohol use: No    Drug use: No    Sexual activity: Not on file       Family History:   Family History   Problem Relation Age of Onset    Asthma Mother     Emphysema Mother     Hypertension Mother     Diabetes Father     Kidney disease Father     Heart failure Maternal Grandmother     Hypertension Maternal Grandmother     Diabetes Maternal Grandfather     Heart failure Maternal Grandfather     Cancer Paternal Grandmother         ovarian       Review of Systems:  A 10 point review of systems was performed and was normal, except as mentioned in the HPI, including constitutional, HEENT, heme, lymph, cardiovascular, respiratory, gastrointestinal, genitourinary, neurologic, endocrine, psychiatric and musculoskeletal.      OBJECTIVE:    Physical Exam:  24 Hour Vital Sign Ranges: Temp:  [97.9 °F (36.6 °C)] 97.9 °F (36.6 °C)  Pulse:  [67-97] 67  Resp:  [13-16] 16  SpO2:  [97 %-100 %] 99 %  BP: (110-170)/(69-84) 110/69  Most recent vitals: /69   Pulse 67   Temp 97.9 °F (36.6 °C)   Resp 16   Ht 5' 6" (1.676 m)   Wt 80.7 kg (178 lb)   SpO2 99%   BMI 28.73 kg/m²    GEN: well-developed, well-nourished, awake and alert, non-toxic appearing adult  HEENT: PERRL, sclera anicteric, oral mucosa pink and moist without lesion  NECK: trachea midline; Good ROM  CV: regular rate and rhythm, no murmurs or gallops  RESP: clear to auscultation bilaterally, no wheezes, rhonci or rales  ABD: soft, tender to palpation in the epigastrium, non-distended, normal bowel sounds  EXT: no swelling or edema, 2+ pulses distally  SKIN: no rashes or jaundice  PSYCH: normal affect    Labs:   Recent Labs     04/30/23  1026   WBC 12.67   MCV 87        Recent Labs     04/30/23  1026    "   K 3.8      CO2 26   BUN 15   *     No results for input(s): ALB in the last 72 hours.    Invalid input(s): ALKP, SGOT, SGPT, TBIL, DBIL, TPRO  Recent Labs     04/30/23  1026   INR 1.0         Radiology Review:  CT Abdomen Pelvis With Contrast   Final Result      US Abdomen Limited (Gallbladder)   Final Result      X-Ray Chest AP Portable   Final Result            IMPRESSION / RECOMMENDATIONS:  Pancreatitis with abnormal appearance of the gallbladder on imaging  Polypoid appearance of the gallbladder  -IV fluids recommend 2-3 L bolus now followed by 150 cc of lactated Ringer's per hour overnight   -recommend cholecystectomy  -Check triglyceride level    Thank you for this consult.    Denis Miles  4/30/2023  1:55 PM

## 2023-05-01 PROBLEM — R10.13 EPIGASTRIC PAIN: Status: RESOLVED | Noted: 2023-04-30 | Resolved: 2023-05-01

## 2023-05-01 PROBLEM — R79.9 ABNORMAL BLOOD CHEMISTRY: Status: RESOLVED | Noted: 2020-02-24 | Resolved: 2023-05-01

## 2023-05-01 LAB
ALBUMIN SERPL BCP-MCNC: 3.8 G/DL (ref 3.5–5.2)
ALP SERPL-CCNC: 50 U/L (ref 55–135)
ALT SERPL W/O P-5'-P-CCNC: 48 U/L (ref 10–44)
ANION GAP SERPL CALC-SCNC: 7 MMOL/L (ref 8–16)
AST SERPL-CCNC: 28 U/L (ref 10–40)
BASOPHILS # BLD AUTO: 0.02 K/UL (ref 0–0.2)
BASOPHILS NFR BLD: 0.1 % (ref 0–1.9)
BILIRUB SERPL-MCNC: 0.8 MG/DL (ref 0.1–1)
BUN SERPL-MCNC: 11 MG/DL (ref 6–20)
CALCIUM SERPL-MCNC: 8.7 MG/DL (ref 8.7–10.5)
CHLORIDE SERPL-SCNC: 106 MMOL/L (ref 95–110)
CO2 SERPL-SCNC: 27 MMOL/L (ref 23–29)
CREAT SERPL-MCNC: 0.7 MG/DL (ref 0.5–1.4)
DIFFERENTIAL METHOD: ABNORMAL
EOSINOPHIL # BLD AUTO: 0.2 K/UL (ref 0–0.5)
EOSINOPHIL NFR BLD: 1 % (ref 0–8)
ERYTHROCYTE [DISTWIDTH] IN BLOOD BY AUTOMATED COUNT: 13.3 % (ref 11.5–14.5)
EST. GFR  (NO RACE VARIABLE): >60 ML/MIN/1.73 M^2
GLUCOSE SERPL-MCNC: 96 MG/DL (ref 70–110)
HCT VFR BLD AUTO: 37 % (ref 37–48.5)
HGB BLD-MCNC: 12.1 G/DL (ref 12–16)
IMM GRANULOCYTES # BLD AUTO: 0.05 K/UL (ref 0–0.04)
IMM GRANULOCYTES NFR BLD AUTO: 0.3 % (ref 0–0.5)
LIPASE SERPL-CCNC: 63 U/L (ref 4–60)
LYMPHOCYTES # BLD AUTO: 1.4 K/UL (ref 1–4.8)
LYMPHOCYTES NFR BLD: 9.2 % (ref 18–48)
MCH RBC QN AUTO: 28.8 PG (ref 27–31)
MCHC RBC AUTO-ENTMCNC: 32.7 G/DL (ref 32–36)
MCV RBC AUTO: 88 FL (ref 82–98)
MONOCYTES # BLD AUTO: 1.1 K/UL (ref 0.3–1)
MONOCYTES NFR BLD: 7.5 % (ref 4–15)
NEUTROPHILS # BLD AUTO: 12.1 K/UL (ref 1.8–7.7)
NEUTROPHILS NFR BLD: 81.9 % (ref 38–73)
NRBC BLD-RTO: 0 /100 WBC
PLATELET # BLD AUTO: 258 K/UL (ref 150–450)
PMV BLD AUTO: 9.4 FL (ref 9.2–12.9)
POTASSIUM SERPL-SCNC: 4 MMOL/L (ref 3.5–5.1)
PROT SERPL-MCNC: 6.6 G/DL (ref 6–8.4)
RBC # BLD AUTO: 4.2 M/UL (ref 4–5.4)
SODIUM SERPL-SCNC: 140 MMOL/L (ref 136–145)
WBC # BLD AUTO: 14.74 K/UL (ref 3.9–12.7)

## 2023-05-01 PROCEDURE — G0378 HOSPITAL OBSERVATION PER HR: HCPCS

## 2023-05-01 PROCEDURE — 94761 N-INVAS EAR/PLS OXIMETRY MLT: CPT

## 2023-05-01 PROCEDURE — 25000003 PHARM REV CODE 250: Performed by: NURSE PRACTITIONER

## 2023-05-01 PROCEDURE — 63600175 PHARM REV CODE 636 W HCPCS: Performed by: NURSE PRACTITIONER

## 2023-05-01 PROCEDURE — 99223 PR INITIAL HOSPITAL CARE,LEVL III: ICD-10-PCS | Mod: ,,, | Performed by: STUDENT IN AN ORGANIZED HEALTH CARE EDUCATION/TRAINING PROGRAM

## 2023-05-01 PROCEDURE — 99223 1ST HOSP IP/OBS HIGH 75: CPT | Mod: ,,, | Performed by: STUDENT IN AN ORGANIZED HEALTH CARE EDUCATION/TRAINING PROGRAM

## 2023-05-01 PROCEDURE — 63600175 PHARM REV CODE 636 W HCPCS: Performed by: STUDENT IN AN ORGANIZED HEALTH CARE EDUCATION/TRAINING PROGRAM

## 2023-05-01 PROCEDURE — 80053 COMPREHEN METABOLIC PANEL: CPT | Performed by: INTERNAL MEDICINE

## 2023-05-01 PROCEDURE — 99900035 HC TECH TIME PER 15 MIN (STAT)

## 2023-05-01 PROCEDURE — 25000242 PHARM REV CODE 250 ALT 637 W/ HCPCS: Performed by: INTERNAL MEDICINE

## 2023-05-01 PROCEDURE — 36415 COLL VENOUS BLD VENIPUNCTURE: CPT | Performed by: INTERNAL MEDICINE

## 2023-05-01 PROCEDURE — 96366 THER/PROPH/DIAG IV INF ADDON: CPT

## 2023-05-01 PROCEDURE — 63600175 PHARM REV CODE 636 W HCPCS: Performed by: INTERNAL MEDICINE

## 2023-05-01 PROCEDURE — 25000003 PHARM REV CODE 250: Performed by: STUDENT IN AN ORGANIZED HEALTH CARE EDUCATION/TRAINING PROGRAM

## 2023-05-01 PROCEDURE — 83690 ASSAY OF LIPASE: CPT | Performed by: INTERNAL MEDICINE

## 2023-05-01 PROCEDURE — 85025 COMPLETE CBC W/AUTO DIFF WBC: CPT | Performed by: INTERNAL MEDICINE

## 2023-05-01 PROCEDURE — 96376 TX/PRO/DX INJ SAME DRUG ADON: CPT

## 2023-05-01 PROCEDURE — 63600175 PHARM REV CODE 636 W HCPCS: Performed by: HOSPITALIST

## 2023-05-01 RX ORDER — MORPHINE SULFATE 4 MG/ML
4 INJECTION, SOLUTION INTRAMUSCULAR; INTRAVENOUS EVERY 4 HOURS PRN
Status: DISCONTINUED | OUTPATIENT
Start: 2023-05-01 | End: 2023-05-01

## 2023-05-01 RX ORDER — HYDROCODONE BITARTRATE AND ACETAMINOPHEN 7.5; 325 MG/1; MG/1
1 TABLET ORAL EVERY 4 HOURS PRN
Status: DISCONTINUED | OUTPATIENT
Start: 2023-05-01 | End: 2023-05-03

## 2023-05-01 RX ORDER — MORPHINE SULFATE 10 MG/ML
5 INJECTION INTRAMUSCULAR; INTRAVENOUS; SUBCUTANEOUS
Status: DISCONTINUED | OUTPATIENT
Start: 2023-05-01 | End: 2023-05-03

## 2023-05-01 RX ADMIN — HYDROCODONE BITARTRATE AND ACETAMINOPHEN 1 TABLET: 7.5; 325 TABLET ORAL at 11:05

## 2023-05-01 RX ADMIN — HYDROCODONE BITARTRATE AND ACETAMINOPHEN 1 TABLET: 7.5; 325 TABLET ORAL at 04:05

## 2023-05-01 RX ADMIN — MORPHINE SULFATE 5 MG: 10 INJECTION INTRAVENOUS at 07:05

## 2023-05-01 RX ADMIN — HYDROCODONE BITARTRATE AND ACETAMINOPHEN 1 TABLET: 5; 325 TABLET ORAL at 08:05

## 2023-05-01 RX ADMIN — HYDROCODONE BITARTRATE AND ACETAMINOPHEN 1 TABLET: 7.5; 325 TABLET ORAL at 08:05

## 2023-05-01 RX ADMIN — SODIUM CHLORIDE, SODIUM LACTATE, POTASSIUM CHLORIDE, AND CALCIUM CHLORIDE: .6; .31; .03; .02 INJECTION, SOLUTION INTRAVENOUS at 04:05

## 2023-05-01 RX ADMIN — PIPERACILLIN AND TAZOBACTAM 4.5 G: 4; .5 INJECTION, POWDER, LYOPHILIZED, FOR SOLUTION INTRAVENOUS at 08:05

## 2023-05-01 RX ADMIN — PIPERACILLIN AND TAZOBACTAM 4.5 G: 4; .5 INJECTION, POWDER, LYOPHILIZED, FOR SOLUTION INTRAVENOUS at 05:05

## 2023-05-01 RX ADMIN — ACETAMINOPHEN 650 MG: 325 TABLET ORAL at 01:05

## 2023-05-01 RX ADMIN — PIPERACILLIN AND TAZOBACTAM 4.5 G: 4; .5 INJECTION, POWDER, LYOPHILIZED, FOR SOLUTION INTRAVENOUS at 01:05

## 2023-05-01 RX ADMIN — ONDANSETRON 4 MG: 2 INJECTION INTRAMUSCULAR; INTRAVENOUS at 08:05

## 2023-05-01 RX ADMIN — HYDROCODONE BITARTRATE AND ACETAMINOPHEN 1 TABLET: 5; 325 TABLET ORAL at 05:05

## 2023-05-01 RX ADMIN — MORPHINE SULFATE 2 MG: 2 INJECTION, SOLUTION INTRAMUSCULAR; INTRAVENOUS at 01:05

## 2023-05-01 RX ADMIN — BUDESONIDE INHALATION 1 MG: 0.5 SUSPENSION RESPIRATORY (INHALATION) at 08:05

## 2023-05-01 NOTE — PLAN OF CARE
[facility]  Initial Discharge Assessment       Primary Care Provider: Patricia Tse NP    Admission Diagnosis: Acute pancreatitis, unspecified complication status, unspecified pancreatitis type [K85.90]    Admission Date: 4/30/2023  Expected Discharge Date:  PT confirmed home address and lives with   Spouse Akash Nelson [phone]. Pt denied HH and DME. Pt confirmed PCP as Patricia Tse NP . PTs insurance is BCBS and her pharmacy is [facility] [address].  PTs spouse will provide transport home. [facility] following for additional needs.   Discharge Barriers Identified: None    Payor: BLUE CROSS BLUE SHIELD / Plan: [employer] [place] [email or web address] OPEN ACCESS / Product Type: Commercial /     Extended Emergency Contact Information  Primary Emergency Contact: Akash Nelson  Address: [address] [place] [address] [facility]  Home Phone: [phone]  Mobile Phone: [phone]  Relation: Spouse    Discharge Plan A: Home with family  Discharge Plan B: Home      [id number] [place], [place] - [address]  [address]  [facility] [address]  Phone: [phone] Fax: [phone]    [id number] - [place], [place] - [address]  [address]  [facility] [address]  Phone: [phone] Fax: [phone]      Initial Assessment (most recent)       Adult Discharge Assessment - 05/01/23 1050          Discharge Assessment    Assessment Type Discharge Planning Assessment     Confirmed/corrected address, phone number and insurance Yes     Confirmed Demographics Correct on Facesheet     Source of Information patient;family     Does patient/caregiver understand observation status Yes     Communicated LAKESHA with patient/caregiver Yes     Reason For Admission Acute Pancreatisis     People in Home spouse     Facility Arrived From: Home     Do you expect to return to your current living situation? Yes     Do you have help at home or someone to help you manage your care at home? Yes     Who are your  caregiver(s) and their phone number(s)? Spouse Akash Nelson [phone]     Prior to hospitilization cognitive status: Alert/Oriented     Current cognitive status: Alert/Oriented     Home Layout Able to live on 1st floor     Equipment Currently Used at Home none     Readmission within 30 days? No     Patient currently being followed by outpatient case management? No     Do you currently have service(s) that help you manage your care at home? No     Do you take prescription medications? No     Do you have prescription coverage? No     Do you have any problems affording any of your prescribed medications? No     Is the patient taking medications as prescribed? yes     Who is going to help you get home at discharge? Spouse Akash Nelson [phone]     How do you get to doctors appointments? car, drives self     Are you on dialysis? No     Do you take coumadin? No     Discharge Plan A Home with family     Discharge Plan B Home     DME Needed Upon Discharge  none     Discharge Plan discussed with: Patient;Spouse/sig other     Name(s) and Number(s) Spouse Akash Nelson [phone]     Discharge Barriers Identified None        Physical Activity    On average, how many days per week do you engage in moderate to strenuous exercise (like a brisk walk)? Patient refused     On average, how many minutes do you engage in exercise at this level? Patient refused        Financial Resource Strain    How hard is it for you to pay for the very basics like food, housing, medical care, and heating? Patient refused        Housing Stability    In the last 12 months, was there a time when you were not able to pay the mortgage or rent on time? No     In the last 12 months, was there a time when you did not have a steady place to sleep or slept in a shelter (including now)? No        Transportation Needs    In the past 12 months, has lack of transportation kept you from medical appointments or from getting medications? No     In the past 12  months, has lack of transportation kept you from meetings, work, or from getting things needed for daily living? No        Food Insecurity    Within the past 12 months, you worried that your food would run out before you got the money to buy more. Never true     Within the past 12 months, the food you bought just didn't last and you didn't have money to get more. Never true        Stress    Do you feel stress - tense, restless, nervous, or anxious, or unable to sleep at night because your mind is troubled all the time - these days? Patient refused        Social Connections    In a typical week, how many times do you talk on the phone with family, friends, or neighbors? Patient refused     How often do you get together with friends or relatives? Patient refused     How often do you attend [employer] or [employer] services? More than 4 times per year     Do you belong to any clubs or organizations such as [employer] groups, unions, fraternal or athletic groups, or school groups? Patient refused     How often do you attend meetings of the clubs or organizations you belong to? Patient refused     Are you , , , , never , or living with a partner?         Alcohol Use    Q1: How often do you have a drink containing alcohol? Patient refused     Q2: How many drinks containing alcohol do you have on a typical day when you are drinking? Patient refused     Q3: How often do you have six or more drinks on one occasion? Patient refused        OTHER    Name(s) of People in Home Spouse Akash Nelson [phone]

## 2023-05-01 NOTE — ASSESSMENT & PLAN NOTE
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

## 2023-05-01 NOTE — PLAN OF CARE
Pt not clear for DC at this time due to c/o of severe abd pain this AM during rounds... medications adjusted. GI following. Gen sx following- no plans for sx at this time. Currently NPO

## 2023-05-01 NOTE — PROGRESS NOTES
Automatic Inhaler to Nebulizer Interchange    Fluticasone furoate (Arnuity Ellipta) 200 mcg changed to Budesonide 1 mg BID per [facility] Automatic Therapeutic Substitutions Protocol.    Please contact pharmacy at extension [phone] with any questions.     Thank you,   Saulo Meredith

## 2023-05-01 NOTE — CONSULTS
[facility] hospital  General Surgery  Consult Note    Inpatient consult to General Surgery  Consult performed by: Larry Barba MD  Consult ordered by: Nicholas Ghotra MD      Subjective:     Chief Complaint/Reason for Admission:  Pancreatitis  History of Present Illness:  This is a 62-year-old female who was admitted with deep abdominal pain that radiated to the back.  Initial CT imaging showed mild pancreatitis and pancreatic atrophy.  Patient underwent subsequent ultrasound which did not show gallstones.  I was consulted for possible cholecystectomy given the polypoid masses in the gallbladder.    No current facility-administered medications on file prior to encounter.     Current Outpatient Medications on File Prior to Encounter   Medication Sig    azelastine (ASTELIN) 137 mcg (0.1 %) nasal spray INSTILL ONE SPRAY INTO EACH NOSTRIL TWICE DAILY (Patient taking differently: 1 spray by Nasal route 2 (two) times daily. INSTILL ONE SPRAY INTO EACH NOSTRIL TWICE DAILY)    dutasteride (AVODART) 0.5 mg capsule Take 0.5 mg by mouth once daily.    fluticasone propionate (FLONASE) 50 mcg/actuation nasal spray 2 sprays (100 mcg total) by Each Nostril route 2 (two) times daily.    fluticasone propionate (FLOVENT HFA) 220 mcg/actuation inhaler 2 puffs twice per day (Patient taking differently: Inhale 2 puffs into the lungs 2 (two) times a day. 2 puffs twice per day)    minoxidiL (LONITEN) 2.5 MG tablet Take 1.25 mg by mouth every evening.    montelukast (SINGULAIR) 10 mg tablet Take 1 tablet (10 mg total) by mouth once daily.    spironolactone (ALDACTONE) 100 MG tablet Take 100 mg by mouth once daily.    vitamin D (VITAMIN D3) 1000 units Tab Take 2,000 Units by mouth once daily.    albuterol (PROAIR HFA) 90 mcg/actuation inhaler Inhale 2 puffs into the lungs every 6 (six) hours as needed for Wheezing or Shortness of Breath (COUGH).       Review of patient's allergies indicates:   Allergen Reactions    Benzonatate Other  (See Comments)     dizziness       Past Medical History:   Diagnosis Date    Acute allergic rhinitis due to pollen     Allergy to dust     Chronic cough     Chronic rhinitis     Other pulmonary insufficiency, not elsewhere classified      No past surgical history on file.  Family History       Problem Relation (Age of Onset)    Asthma Mother    Cancer Paternal Grandmother    Diabetes Father, Maternal Grandfather    Emphysema Mother    Heart failure Maternal Grandmother, Maternal Grandfather    Hypertension Mother, Maternal Grandmother    Kidney disease Father          Tobacco Use    Smoking status: Never    Smokeless tobacco: Never   Substance and Sexual Activity    Alcohol use: No    Drug use: No    Sexual activity: Not on file     Review of Systems   Constitutional:  Negative for fever.   HENT: Negative.     Eyes: Negative.    Respiratory: Negative.     Cardiovascular: Negative.    Gastrointestinal:  Positive for abdominal pain.   Endocrine: Negative.    Genitourinary: Negative.    Musculoskeletal:  Positive for back pain.   Skin: Negative.    Allergic/Immunologic: Negative.    Neurological: Negative.    Hematological: Negative.    Psychiatric/Behavioral: Negative.     Objective:     Vital Signs (Most Recent):  Temp: 97.9 °F (36.6 °C) (05/01/23 1506)  Pulse: 76 (05/01/23 1506)  Resp: 17 (05/01/23 1601)  BP: 127/78 (05/01/23 1506)  SpO2: 96 % (05/01/23 1506) Vital Signs (24h Range):  Temp:  [97.9 °F (36.6 °C)-99 °F (37.2 °C)] 97.9 °F (36.6 °C)  Pulse:  [71-84] 76  Resp:  [16-18] 17  SpO2:  [92 %-98 %] 96 %  BP: (106-134)/(66-82) 127/78     Weight: 80.7 kg (178 lb)  Body mass index is 28.73 kg/m².      Intake/Output Summary (Last 24 hours) at 5/1/2023 1709  Last data filed at 5/1/2023 1408  Gross per 24 hour   Intake 240 ml   Output --   Net 240 ml       Physical Exam  Constitutional:       General: She is not in acute distress.     Appearance: Normal appearance. She is not ill-appearing, toxic-appearing or  diaphoretic.   HENT:      Head: Normocephalic.      Nose: Nose normal.   Eyes:      Conjunctiva/sclera: Conjunctivae normal.   Cardiovascular:      Rate and Rhythm: Normal rate and regular rhythm.   Pulmonary:      Effort: Pulmonary effort is normal.   Abdominal:      Palpations: Abdomen is soft.      Tenderness: There is abdominal tenderness.   Musculoskeletal:         General: Normal range of motion.      Cervical back: Normal range of motion.   Skin:     General: Skin is warm.   Neurological:      General: No focal deficit present.      Mental Status: She is alert.   Psychiatric:         Mood and Affect: Mood normal.       Significant Labs:  CBC:   Recent Labs   Lab 05/01/23  0830   WBC 14.74*   RBC 4.20   HGB 12.1   HCT 37.0      MCV 88   MCH 28.8   MCHC 32.7     CMP:   Recent Labs   Lab 05/01/23  0830   GLU 96   CALCIUM 8.7   ALBUMIN 3.8   PROT 6.6      K 4.0   CO2 27      BUN 11   CREATININE 0.7   ALKPHOS 50*   ALT 48*   AST 28   BILITOT 0.8     Coagulation:   Recent Labs   Lab 04/30/23  1026   INR 1.0     Lactic Acid: No results for input(s): LACTATE in the last 48 hours.  Lipase:   Recent Labs   Lab 05/01/23  0830   LIPASE 63*       Significant Diagnostics:  CT scan was reviewed.  Diffusely, there is pancreatic atrophy with mild fat stranding consistent with pancreatitis.  Ultrasound was reviewed.  No evidence of gallstones.  There is likely cholesterol polyposis of the gallbladder inferiorly.  No evidence of cholecystitis    Assessment/Plan:     Active Diagnoses:    Diagnosis Date Noted POA    PRINCIPAL PROBLEM:  Acute pancreatitis [K85.90] 04/30/2023 Yes      Problems Resolved During this Admission:    Diagnosis Date Noted Date Resolved POA    Epigastric pain [R10.13] 04/30/2023 05/01/2023 Yes     62-year-old female with mild acute pancreatitis and pancreatic atrophy of unclear etiology.  It does not seem to be related to a biliary source given the lack of gallstones on ultrasound.  She  does have multiple gallbladder polyps.  This is likely an incidental finding and unlikely to be causing symptoms.      Given her admission for pancreatitis, would not recommend pursuing cholecystectomy while inpatient without significant clinical changes.  Would recommend ongoing supportive care for her pancreatitis.  Okay to start clear liquids and advance as tolerated from my standpoint.  Will set up patient for outpatient follow-up to discuss elective cholecystectomy over observation of her gallbladder polyps in the near future.      Thank you for your consult. I will follow-up with patient. Please contact us if you have any additional questions.    Larry Barba MD  General Surgery  [facility]

## 2023-05-01 NOTE — SUBJECTIVE & OBJECTIVE
Interval History: Patient seen and examined. MARTITA.  bedside. Abdominal pain controlled only for short time periods with pain regimen. Intermittent nausea. No vomiting.       Objective:     Vital Signs (Most Recent):  Temp: 98.4 °F (36.9 °C) (05/01/23 1117)  Pulse: 73 (05/01/23 1117)  Resp: 18 (05/01/23 1117)  BP: 134/76 (05/01/23 1117)  SpO2: (!) 92 % (05/01/23 1117)   Vital Signs (24h Range):  Temp:  [98 °F (36.7 °C)-99 °F (37.2 °C)] 98.4 °F (36.9 °C)  Pulse:  [71-84] 73  Resp:  [13-18] 18  SpO2:  [92 %-98 %] 92 %  BP: (106-134)/(66-86) 134/76     Weight: 80.7 kg (178 lb)  Body mass index is 28.73 kg/m².    Intake/Output Summary (Last 24 hours) at 5/1/2023 1154  Last data filed at 5/1/2023 1028  Gross per 24 hour   Intake 240 ml   Output --   Net 240 ml      Physical Exam  Vitals reviewed.   Constitutional:       General: She is not in acute distress.  HENT:      Head: Normocephalic and atraumatic.   Cardiovascular:      Rate and Rhythm: Normal rate and regular rhythm.      Heart sounds: Normal heart sounds.   Pulmonary:      Effort: Pulmonary effort is normal. No respiratory distress.   Abdominal:      General: Abdomen is flat. Bowel sounds are normal. There is no distension.      Palpations: Abdomen is soft.      Tenderness: There is abdominal tenderness (moderate) in the epigastric area. There is no guarding or rebound.   Neurological:      General: No focal deficit present.      Mental Status: She is alert and oriented to person, place, and time.   Psychiatric:         Mood and Affect: Affect normal.         Behavior: Behavior normal.         Thought Content: Thought content normal.       Significant Labs: All pertinent labs within the past 24 hours have been reviewed.    Significant Imaging: I have reviewed all pertinent imaging results/findings within the past 24 hours.

## 2023-05-01 NOTE — HOSPITAL COURSE
62F with PMH allergies and androgenic alopecia is admitted with abdominal pain and nausea found to have acute pancreatitis. Vitals stable. Noted elevated lipase, AST, and ALT. CT abd/pelv with fatty atrophy of the pancreas with regional inflammation also with abnormal dilated flaccid gallbladder. US abd showed multiple polypoid foci along the peripheral edge of the gallbladder suggestive of hyperplastic cholesterolosis. Normal triglyceride level. Started on IVF, pain meds, and IV zosyn. GI and general surgery consulted. Surgery was not deemed necessary inpatient so she was recommended to follow up with general surgery outpatient after this episode of pancreatitis is resolved to consider cholecystectomy. Zosyn discontinued as no obvious infection identified. Diet advanced as tolerated. Pain medications weaned. She was eventually appropriate for discharge. She is to follow up outpatient with PCP and gen surg. By time of discharge the patient was tolerating a low fat diet with resolving admission symptoms. Patient seen and examined on day of discharge.    Physical exam on day of discharge:  Constitutional:       General: She is not in acute distress.  HENT:      Head: Normocephalic and atraumatic.   Cardiovascular:      Rate and Rhythm: Normal rate and regular rhythm.   Pulmonary:      Effort: Pulmonary effort is normal. No respiratory distress.   Abdominal:      General: Abdomen is flat. Bowel sounds are normal. There is no distension.      Palpations: Abdomen is soft.      Tenderness: There is abdominal tenderness (minimal to mild now) in the epigastric area. There is no guarding or rebound.   Neurological:      General: No focal deficit present.      Mental Status: She is alert and oriented to person, place, and time.   Psychiatric:         Mood and Affect: Affect normal.         Behavior: Behavior normal.         Thought Content: Thought content normal.

## 2023-05-01 NOTE — PROGRESS NOTES
Automatic Inhaler to Nebulizer Interchange    albuterol (Ventolin, ProAir, or Proventil)  mcg given multiple times per day changed to albuterol 2.5 mg Q6H PRN Wheezing, Shortness of Breath, Cough  per [facility] Automatic Therapeutic Substitutions Protocol.    Please contact pharmacy at extension [phone] with any questions.     Thank you,   Saulo Meredith

## 2023-05-01 NOTE — PROGRESS NOTES
[facility] Medicine  Progress Note    Patient Name: Kaia Nelson  MRN: [id number]  Patient Class: OP- Observation   Admission Date: 4/30/2023  Length of Stay: 0 days  Attending Physician: Nicholas Ghotra MD  Primary Care Provider: Patricia Tse NP        Subjective:     Principal Problem:Acute pancreatitis        HPI:  61 yo presents with c/o epigastric pain that radiates to right shoulder. Reports this pain presented 3 weeks ago while in [facility] after a book tour while walking to the car. Reports she started having sharp pain in the epigastric region and  radiated to the right shoulder. Reports the pain was severe but they drove back to [place] not wanting to go to a hospital out of state. Reports by the time they made it home the pain was not gone but slightly dull and she was able to go to bed and rest. Stating the pain has never completely gone away about a week ago the pain got sharp again but not as bas as the initial onset and lasted a couple of hours before getting dull again. The  works out of town and had been gone for 2 weeks and after getting home yesterday she still was not feeling well and he insisted they come to the hospital to be checked out. Reports 2 vomiting episodes with each sharp pain episode. Reports the severe pain has no correlations to food. Denies nausea and vomiting (other than the 2 episodes. No chest pain or shortness of breath and no positive cardiac history.        Overview/Hospital Course:  62F with PMH allergies is admitted with abdominal pain and nausea found to have acute pancreatitis. Vitals stable. Noted elevated lipase, AST, and ALT. CT abd/pelv with fatty atrophy of the pancreas with regional inflammation also with abnormal dilated flaccid gallbladder. US abd showed multiple polypoid foci along the peripheral edge of the gallbladder suggestive of hyperplastic cholesterolosis. Normal triglyceride level. Started on IVF, pain meds, and IV zosyn. GI and  general surgery consulted.      Interval History: Patient seen and examined. GEOFFREYON.  bedside. Abdominal pain controlled only for short time periods with pain regimen. Intermittent nausea. No vomiting.       Objective:     Vital Signs (Most Recent):  Temp: 98.4 °F (36.9 °C) (05/01/23 1117)  Pulse: 73 (05/01/23 1117)  Resp: 18 (05/01/23 1117)  BP: 134/76 (05/01/23 1117)  SpO2: (!) 92 % (05/01/23 1117)   Vital Signs (24h Range):  Temp:  [98 °F (36.7 °C)-99 °F (37.2 °C)] 98.4 °F (36.9 °C)  Pulse:  [71-84] 73  Resp:  [13-18] 18  SpO2:  [92 %-98 %] 92 %  BP: (106-134)/(66-86) 134/76     Weight: 80.7 kg (178 lb)  Body mass index is 28.73 kg/m².    Intake/Output Summary (Last 24 hours) at 5/1/2023 1154  Last data filed at 5/1/2023 1028  Gross per 24 hour   Intake 240 ml   Output --   Net 240 ml      Physical Exam  Vitals reviewed.   Constitutional:       General: She is not in acute distress.  HENT:      Head: Normocephalic and atraumatic.   Cardiovascular:      Rate and Rhythm: Normal rate and regular rhythm.      Heart sounds: Normal heart sounds.   Pulmonary:      Effort: Pulmonary effort is normal. No respiratory distress.   Abdominal:      General: Abdomen is flat. Bowel sounds are normal. There is no distension.      Palpations: Abdomen is soft.      Tenderness: There is abdominal tenderness (moderate) in the epigastric area. There is no guarding or rebound.   Neurological:      General: No focal deficit present.      Mental Status: She is alert and oriented to person, place, and time.   Psychiatric:         Mood and Affect: Affect normal.         Behavior: Behavior normal.         Thought Content: Thought content normal.       Significant Labs: All pertinent labs within the past 24 hours have been reviewed.    Significant Imaging: I have reviewed all pertinent imaging results/findings within the past 24 hours.      Assessment/Plan:      * Acute pancreatitis  Unclear etiology but possibly related to gallbladder  hyperplastic cholesterolosis  CT abd/pelv and US abd reviewed  - NPO for now; f/u consult recs  - continue IVF  - pain meds  - continue zosyn for now  - consults to GI and gen surg      VTE Risk Mitigation (From admission, onward)         Ordered     IP VTE LOW RISK PATIENT  Once         04/30/23 1900                Discharge Planning   LAKESHA:  5/3-5/4    Code Status: Full Code   Is the patient medically ready for discharge?:     Reason for patient still in hospital (select all that apply): Patient trending condition and Consult recommendations  Discharge Plan A: Home with family                  Nicholas Ghotra MD  Department of Hospital Medicine   [facility]

## 2023-05-02 PROBLEM — J30.1 NON-SEASONAL ALLERGIC RHINITIS DUE TO POLLEN: Chronic | Status: ACTIVE | Noted: 2017-07-07

## 2023-05-02 PROBLEM — L64.9 ANDROGENIC ALOPECIA: Chronic | Status: ACTIVE | Noted: 2023-05-02

## 2023-05-02 LAB
ALBUMIN SERPL BCP-MCNC: 3.2 G/DL (ref 3.5–5.2)
ALP SERPL-CCNC: 44 U/L (ref 55–135)
ALT SERPL W/O P-5'-P-CCNC: 33 U/L (ref 10–44)
ANION GAP SERPL CALC-SCNC: 9 MMOL/L (ref 8–16)
AST SERPL-CCNC: 18 U/L (ref 10–40)
BILIRUB SERPL-MCNC: 1.1 MG/DL (ref 0.1–1)
BUN SERPL-MCNC: 11 MG/DL (ref 6–20)
CALCIUM SERPL-MCNC: 8.6 MG/DL (ref 8.7–10.5)
CHLORIDE SERPL-SCNC: 105 MMOL/L (ref 95–110)
CO2 SERPL-SCNC: 24 MMOL/L (ref 23–29)
CREAT SERPL-MCNC: 0.6 MG/DL (ref 0.5–1.4)
ERYTHROCYTE [DISTWIDTH] IN BLOOD BY AUTOMATED COUNT: 13 % (ref 11.5–14.5)
EST. GFR  (NO RACE VARIABLE): >60 ML/MIN/1.73 M^2
GLUCOSE SERPL-MCNC: 80 MG/DL (ref 70–110)
HCT VFR BLD AUTO: 33.5 % (ref 37–48.5)
HGB BLD-MCNC: 11.1 G/DL (ref 12–16)
MCH RBC QN AUTO: 29 PG (ref 27–31)
MCHC RBC AUTO-ENTMCNC: 33.1 G/DL (ref 32–36)
MCV RBC AUTO: 88 FL (ref 82–98)
PLATELET # BLD AUTO: 221 K/UL (ref 150–450)
PMV BLD AUTO: 9.7 FL (ref 9.2–12.9)
POTASSIUM SERPL-SCNC: 3.8 MMOL/L (ref 3.5–5.1)
PROT SERPL-MCNC: 6 G/DL (ref 6–8.4)
RBC # BLD AUTO: 3.83 M/UL (ref 4–5.4)
SODIUM SERPL-SCNC: 138 MMOL/L (ref 136–145)
WBC # BLD AUTO: 16.28 K/UL (ref 3.9–12.7)

## 2023-05-02 PROCEDURE — 25000003 PHARM REV CODE 250: Performed by: STUDENT IN AN ORGANIZED HEALTH CARE EDUCATION/TRAINING PROGRAM

## 2023-05-02 PROCEDURE — 96366 THER/PROPH/DIAG IV INF ADDON: CPT

## 2023-05-02 PROCEDURE — 36415 COLL VENOUS BLD VENIPUNCTURE: CPT | Performed by: STUDENT IN AN ORGANIZED HEALTH CARE EDUCATION/TRAINING PROGRAM

## 2023-05-02 PROCEDURE — 80053 COMPREHEN METABOLIC PANEL: CPT | Performed by: STUDENT IN AN ORGANIZED HEALTH CARE EDUCATION/TRAINING PROGRAM

## 2023-05-02 PROCEDURE — 21400001 HC TELEMETRY ROOM

## 2023-05-02 PROCEDURE — 85027 COMPLETE CBC AUTOMATED: CPT | Performed by: STUDENT IN AN ORGANIZED HEALTH CARE EDUCATION/TRAINING PROGRAM

## 2023-05-02 PROCEDURE — 99232 SBSQ HOSP IP/OBS MODERATE 35: CPT | Mod: ,,, | Performed by: STUDENT IN AN ORGANIZED HEALTH CARE EDUCATION/TRAINING PROGRAM

## 2023-05-02 PROCEDURE — 63600175 PHARM REV CODE 636 W HCPCS: Performed by: NURSE PRACTITIONER

## 2023-05-02 PROCEDURE — 63600175 PHARM REV CODE 636 W HCPCS: Performed by: STUDENT IN AN ORGANIZED HEALTH CARE EDUCATION/TRAINING PROGRAM

## 2023-05-02 PROCEDURE — 25000003 PHARM REV CODE 250: Performed by: NURSE PRACTITIONER

## 2023-05-02 PROCEDURE — 99232 PR SUBSEQUENT HOSPITAL CARE,LEVL II: ICD-10-PCS | Mod: ,,, | Performed by: STUDENT IN AN ORGANIZED HEALTH CARE EDUCATION/TRAINING PROGRAM

## 2023-05-02 PROCEDURE — 99900031 HC PATIENT EDUCATION (STAT)

## 2023-05-02 PROCEDURE — 25000003 PHARM REV CODE 250: Performed by: HOSPITALIST

## 2023-05-02 PROCEDURE — 94799 UNLISTED PULMONARY SVC/PX: CPT

## 2023-05-02 PROCEDURE — 94761 N-INVAS EAR/PLS OXIMETRY MLT: CPT

## 2023-05-02 PROCEDURE — 25000242 PHARM REV CODE 250 ALT 637 W/ HCPCS: Performed by: INTERNAL MEDICINE

## 2023-05-02 PROCEDURE — 94640 AIRWAY INHALATION TREATMENT: CPT

## 2023-05-02 PROCEDURE — 99900035 HC TECH TIME PER 15 MIN (STAT)

## 2023-05-02 RX ADMIN — HYDROCODONE BITARTRATE AND ACETAMINOPHEN 1 TABLET: 7.5; 325 TABLET ORAL at 11:05

## 2023-05-02 RX ADMIN — PIPERACILLIN AND TAZOBACTAM 4.5 G: 4; .5 INJECTION, POWDER, LYOPHILIZED, FOR SOLUTION INTRAVENOUS at 05:05

## 2023-05-02 RX ADMIN — HYDROCODONE BITARTRATE AND ACETAMINOPHEN 1 TABLET: 7.5; 325 TABLET ORAL at 08:05

## 2023-05-02 RX ADMIN — MONTELUKAST 10 MG: 10 TABLET, FILM COATED ORAL at 08:05

## 2023-05-02 RX ADMIN — HYDROCODONE BITARTRATE AND ACETAMINOPHEN 1 TABLET: 7.5; 325 TABLET ORAL at 03:05

## 2023-05-02 RX ADMIN — DUTASTERIDE 0.5 MG: 0.5 CAPSULE, LIQUID FILLED ORAL at 08:05

## 2023-05-02 RX ADMIN — BUDESONIDE INHALATION 1 MG: 0.5 SUSPENSION RESPIRATORY (INHALATION) at 08:05

## 2023-05-02 RX ADMIN — SODIUM CHLORIDE, SODIUM LACTATE, POTASSIUM CHLORIDE, AND CALCIUM CHLORIDE: .6; .31; .03; .02 INJECTION, SOLUTION INTRAVENOUS at 03:05

## 2023-05-02 RX ADMIN — AZELASTINE HYDROCHLORIDE 137 MCG: 137 SPRAY, METERED NASAL at 08:05

## 2023-05-02 RX ADMIN — HYDROCODONE BITARTRATE AND ACETAMINOPHEN 1 TABLET: 7.5; 325 TABLET ORAL at 07:05

## 2023-05-02 RX ADMIN — ACETAMINOPHEN 650 MG: 325 TABLET ORAL at 01:05

## 2023-05-02 RX ADMIN — SODIUM CHLORIDE, SODIUM LACTATE, POTASSIUM CHLORIDE, AND CALCIUM CHLORIDE: .6; .31; .03; .02 INJECTION, SOLUTION INTRAVENOUS at 01:05

## 2023-05-02 RX ADMIN — SPIRONOLACTONE 100 MG: 50 TABLET ORAL at 08:05

## 2023-05-02 RX ADMIN — Medication 2000 UNITS: at 08:05

## 2023-05-02 RX ADMIN — MINOXIDIL 2.5 MG: 2.5 TABLET ORAL at 08:05

## 2023-05-02 RX ADMIN — FLUTICASONE PROPIONATE 100 MCG: 50 SPRAY, METERED NASAL at 08:05

## 2023-05-02 NOTE — PROGRESS NOTES
"[facility]  Adult Nutrition   Progress Note (Initial Assessment)     SUMMARY     Recommendations  Recommendation/Intervention:   1) Continue diet and advance as tolerated to low fat diet.   2) Encourage use of food prefs to improve intake.   3) RD to follow up.    Goals: Advance to low fat diet and improve intake > 50% at meals.    Dietitian Rounds Brief  Pt in the room with . States she hasnt really eaten since Saturday when she had a kendra bar for Bk and mashed potatoes for lunch that she threw up. She did tolerate some broth and tea today.    Diet order:   Current Diet Order: Clear Liquid diet                 Evaluation of Received Nutrient/Fluid Intake  % Kcal Needs: 0-25%  % Protein Needs: 0-25%  Energy Calories Required: not meeting needs  Protein Required: not meeting needs  Fluid Required: not meeting needs  Tolerance: not tolerating     % Intake of Estimated Energy Needs: 0 - 25 %  % Meal Intake: 0 - 25 %      Intake/Output Summary (Last 24 hours) at 5/2/2023 1510  Last data filed at 5/2/2023 0333  Gross per 24 hour   Intake 240 ml   Output --   Net 240 ml        Anthropometrics  Temp: 98.2 °F (36.8 °C)  Height Method: Stated  Height: 5' 6" (167.6 cm)  Height (inches): 66 in  Weight Method: Standard Scale  Weight: 80.7 kg (178 lb)  Weight (lb): 178 lb  Ideal Body Weight (IBW), Female: 130 lb  % Ideal Body Weight, Female (lb): 136.92 %  BMI (Calculated): 28.7  BMI Grade: 25 - 29.9 - overweight       Estimated/Assessed Needs  Weight Used For Calorie Calculations: 80 kg (176 lb 5.9 oz)  Energy Calorie Requirements (kcal): 0454-8777 kcal (20-25 kcal/ 80 kg bw)  Energy Need Method: Kcal/kg  Protein Requirements: 80-96g (1.0-1.2g/ 80 kg bw)  Weight Used For Protein Calculations: 80 kg (176 lb 5.9 oz)     Estimated Fluid Requirement Method: RDA Method  RDA Method (mL): 1600       Reason for Assessment  Reason For Assessment: identified at risk by screening criteria  Diagnosis: " infection/sepsis  Relevant Medical History: pancreatitis  Interdisciplinary Rounds: did not attend  General Information Comments: Pt in the room with . States she hasnt really eaten since Saturday when she had a kendra bar for Bk and mashed potatoes for lunch that she threw up. She did tolerate some broth and tea today.    Nutrition/Diet History       Nutrition Risk Screen  Nutrition Risk Screen: no indicators present     MST Score: 0  Have you recently lost weight without trying?: No  Weight loss score: 0  Have you been eating poorly because of a decreased appetite?: No  Appetite score: 0       Weight History:  Wt Readings from Last 10 Encounters:   04/30/23 80.7 kg (178 lb)   09/27/22 79.8 kg (176 lb)   09/28/21 78.6 kg (173 lb 3.2 oz)   06/03/21 77.1 kg (170 lb)   04/07/21 78.6 kg (173 lb 5 oz)   09/25/20 83 kg (183 lb)   03/20/20 90 kg (198 lb 6.6 oz)   02/24/20 90.3 kg (199 lb)   01/17/20 88.2 kg (194 lb 7.1 oz)   01/09/20 88.2 kg (194 lb 7 oz)        Lab/Procedures/Meds: Pertinent Labs/Meds Reviewed    Medications:Pertinent Medications Reviewed  Scheduled Meds:   azelastine  1 spray Nasal BID    budesonide  1 mg Nebulization Q12H    vitamin D  2,000 Units Oral Daily    dutasteride  0.5 mg Oral Daily    fluticasone propionate  2 spray Each Nostril BID    minoxidiL  2.5 mg Oral QHS    montelukast  10 mg Oral QHS    polyethylene glycol  17 g Oral Daily    spironolactone  100 mg Oral Daily     Continuous Infusions:   lactated ringers 75 mL/hr at 05/02/23 0744     PRN Meds:.acetaminophen, albuterol sulfate, HYDROcodone-acetaminophen, melatonin, morphine, ondansetron, promethazine (PHENERGAN) IVPB, sodium chloride 0.9%    Labs: Pertinent Labs Reviewed  Clinical Chemistry:  Recent Labs   Lab 04/30/23  1026 05/01/23  0830 05/02/23  0325    140 138   K 3.8 4.0 3.8    106 105   CO2 26 27 24   * 96 80   BUN 15 11 11   CREATININE 0.6 0.7 0.6   CALCIUM 9.3 8.7 8.6*   PROT 7.5 6.6 6.0   ALBUMIN 4.3  3.8 3.2*   BILITOT 0.7 0.8 1.1*   ALKPHOS 59 50* 44*   AST 67* 28 18   ALT 82* 48* 33   ANIONGAP 9 7* 9   LIPASE 344* 63*  --      CBC:   Recent Labs   Lab 05/02/23  0325   WBC 16.28*   RBC 3.83*   HGB 11.1*   HCT 33.5*      MCV 88   MCH 29.0   MCHC 33.1     Lipid Panel:  Recent Labs   Lab 04/30/23  1026   TRIG 60     Cardiac Profile:  Recent Labs   Lab 04/30/23  1026   BNP 55     Inflammatory Labs:  No results for input(s): CRP in the last 168 hours.  Diabetes:  No results for input(s): HGBA1C, POCTGLUCOSE in the last 168 hours.  Thyroid & Parathyroid:  Recent Labs   Lab 04/30/23  1026   TSH 3.080       Monitor and Evaluation  Food and Nutrient Intake: energy intake, food and beverage intake     Nutrition Risk  Level of Risk/Frequency of Follow-up: moderate - high     Nutrition Follow-Up  RD Follow-up?: Yes      Steffany Watters RD 05/02/2023 3:10 PM

## 2023-05-02 NOTE — ASSESSMENT & PLAN NOTE
Unclear etiology but possibly related to gallbladder hyperplastic cholesterolosis vs idiopathic  CT abd/pelv and US abd reviewed  - Adv diet as tolerated; did not tolerate much CLD  - continue IVF; decrease rate  - pain meds  - dc zosyn; no obvious infx  - consults to GI and gen surg; f/u recs

## 2023-05-02 NOTE — SUBJECTIVE & OBJECTIVE
Interval History: Patient seen and examined. NAEON. Pain similar maybe slightly better. Still with poor PO intake.      Objective:     Vital Signs (Most Recent):  Temp: 98.2 °F (36.8 °C) (05/02/23 1142)  Pulse: 68 (05/02/23 1142)  Resp: 19 (05/02/23 1142)  BP: (!) 141/82 (05/02/23 1142)  SpO2: 98 % (05/02/23 1142)   Vital Signs (24h Range):  Temp:  [97.9 °F (36.6 °C)-98.5 °F (36.9 °C)] 98.2 °F (36.8 °C)  Pulse:  [68-82] 68  Resp:  [14-19] 19  SpO2:  [94 %-98 %] 98 %  BP: (111-141)/(57-82) 141/82     Weight: 80.7 kg (178 lb)  Body mass index is 28.73 kg/m².    Intake/Output Summary (Last 24 hours) at 5/2/2023 1214  Last data filed at 5/2/2023 0333  Gross per 24 hour   Intake 240 ml   Output --   Net 240 ml      Physical Exam  Vitals reviewed.   Constitutional:       General: She is not in acute distress.  HENT:      Head: Normocephalic and atraumatic.   Cardiovascular:      Rate and Rhythm: Normal rate and regular rhythm.   Pulmonary:      Effort: Pulmonary effort is normal. No respiratory distress.   Abdominal:      General: Abdomen is flat. Bowel sounds are normal. There is no distension.      Palpations: Abdomen is soft.      Tenderness: There is abdominal tenderness (moderate) in the epigastric area. There is no guarding or rebound.   Neurological:      General: No focal deficit present.      Mental Status: She is alert and oriented to person, place, and time.   Psychiatric:         Mood and Affect: Affect normal.         Behavior: Behavior normal.         Thought Content: Thought content normal.       Significant Labs: All pertinent labs within the past 24 hours have been reviewed.    Significant Imaging: I have reviewed all pertinent imaging results/findings within the past 24 hours.   Refill per VO of Dr. Jane Chung  Last appt: 5/5/2022  Future Appointments   Date Time Provider Mary Dodd   5/5/2023  9:40 AM Ralph Miller MD [facility] BS AMB       Requested Prescriptions     Signed Prescriptions Disp Refills    simvastatin (ZOCOR) 40 mg tablet 90 Tablet 3     Sig: TAKE 1 TABLET BY MOUTH EVERY DAY AT NIGHT     Authorizing Provider: Arvil Degree     Ordering User: Cleve Dias

## 2023-05-02 NOTE — PLAN OF CARE
05/02/23 0806   Patient Assessment/Suction   Level of Consciousness (AVPU) alert   Respiratory Effort Unlabored;Normal   Expansion/Accessory Muscles/Retractions no use of accessory muscles   All Lung Fields Breath Sounds clear;equal bilaterally   Rhythm/Pattern, Respiratory unlabored;pattern regular;depth regular   Cough Frequency no cough   PRE-TX-O2   Device (Oxygen Therapy) room air   SpO2 (!) 94 %   Pulse Oximetry Type Intermittent   $ Pulse Oximetry - Multiple Charge Pulse Oximetry - Multiple   Pulse 72   Resp 14   Aerosol Therapy   $ Aerosol Therapy Charges Aerosol Treatment   Daily Review of Necessity (SVN) completed   Respiratory Treatment Status (SVN) given   Treatment Route (SVN) mask;oxygen   Patient Position (SVN) HOB elevated   Post Treatment Assessment (SVN) breath sounds unchanged   Signs of Intolerance (SVN) none   Breath Sounds Post-Respiratory Treatment   Throughout All Fields Post-Treatment All Fields   Throughout All Fields Post-Treatment Anterior:;Posterior:;clear   Post-treatment Heart Rate (beats/min) 69   Post-treatment Resp Rate (breaths/min) 16   Education   $ Education 15 min;DME Nebulizer   Respiratory Evaluation   $ Care Plan Tech Time 15 min   $ Eval/Re-eval Charges Re-evaluation

## 2023-05-02 NOTE — PROGRESS NOTES
Pt seen and examined.  No significant changes.  Less nausea.  Tolerating sips of clears    VSS  Abd soft, mild tenderness    Labs reviewed.  Slight increase in bili and WBC    No significant changes from my perspective.  Slow diet advancement as tolerated and as symptoms improve  Will follow peripherally.  Please call with questions

## 2023-05-02 NOTE — PROGRESS NOTES
"Gastroenterology    CC: abd pain    S: pt with ongoing mild to moderate epigastric discomfort.  Urinating well.  Afebrile.      Lab Results   Component Value Date    WBC 14.74 (H) 05/01/2023    HGB 12.1 05/01/2023    HCT 37.0 05/01/2023    MCV 88 05/01/2023     05/01/2023     4  BMP  Lab Results   Component Value Date     05/01/2023    K 4.0 05/01/2023     05/01/2023    CO2 27 05/01/2023    BUN 11 05/01/2023    CREATININE 0.7 05/01/2023    CALCIUM 8.7 05/01/2023    ANIONGAP 7 (L) 05/01/2023    EGFRNORACEVR >60.0 05/01/2023     Lab Results   Component Value Date    ALT 48 (H) 05/01/2023    AST 28 05/01/2023    ALKPHOS 50 (L) 05/01/2023    BILITOT 0.8 05/01/2023         Past Medical History:   Diagnosis Date    Acute allergic rhinitis due to pollen     Allergy to dust     Chronic cough     Chronic rhinitis     Other pulmonary insufficiency, not elsewhere classified        Review of Systems  General ROS: negative for chills, fever or weight loss  Cardiovascular ROS: no chest pain or dyspnea on exertion  Gastrointestinal ROS: + abdominal pain, no change in bowel habits, or black/ bloody stools    Physical Examination  /60 mmHg  Pulse 67  Temp(Src) 97.9 °F (36.6 °C) (Oral)  Resp 15  Ht 5' 8" (1.727 m)  Wt 74.9 kg (165 lb 2 oz)  BMI 25.11 kg/m2  SpO2 100%  LMP  (LMP Unknown)  Breastfeeding? No  General appearance: alert, cooperative, no distress  HENT: Normocephalic, atraumatic, neck symmetrical, no nasal discharge   Lungs: clear to auscultation bilaterally, no dullness to percussion bilaterally  Heart: regular rate and rhythm without rub; no displacement of the PMI   Abdomen: soft, ttp in epigastrium; bowel sounds normoactive; no organomegaly  Extremities: extremities symmetric; no clubbing, cyanosis, or edema  Neurologic: Alert and oriented X 3, normal strength, normal coordination and gait    Labs:  TGL wnl  Lab Results   Component Value Date    ALT 48 (H) 05/01/2023    AST 28 " 05/01/2023    ALKPHOS 50 (L) 05/01/2023    BILITOT 0.8 05/01/2023          Assessment:   62 y.o. female with pancreatitis of unclear etiology    Plan:  GB pathology present but no clear cut stones and more polypoid appearance of GB.  Still suspect GB   Normal tgl. No etoh.  NO new medication    Continue with iv fluids until tolerating PO.  Liquids okay then as tolerated  Recommend WANDA [facility]  [facility]  Cell: [phone]

## 2023-05-03 ENCOUNTER — TELEPHONE (OUTPATIENT)
Dept: FAMILY MEDICINE | Facility: CLINIC | Age: 63
End: 2023-05-03
Payer: COMMERCIAL

## 2023-05-03 LAB
ALBUMIN SERPL BCP-MCNC: 3.1 G/DL (ref 3.5–5.2)
ALP SERPL-CCNC: 42 U/L (ref 55–135)
ALT SERPL W/O P-5'-P-CCNC: 27 U/L (ref 10–44)
ANION GAP SERPL CALC-SCNC: 11 MMOL/L (ref 8–16)
AST SERPL-CCNC: 16 U/L (ref 10–40)
BILIRUB SERPL-MCNC: 1 MG/DL (ref 0.1–1)
BUN SERPL-MCNC: 11 MG/DL (ref 6–20)
CALCIUM SERPL-MCNC: 8.4 MG/DL (ref 8.7–10.5)
CHLORIDE SERPL-SCNC: 102 MMOL/L (ref 95–110)
CO2 SERPL-SCNC: 23 MMOL/L (ref 23–29)
CREAT SERPL-MCNC: 0.5 MG/DL (ref 0.5–1.4)
ERYTHROCYTE [DISTWIDTH] IN BLOOD BY AUTOMATED COUNT: 12.9 % (ref 11.5–14.5)
EST. GFR  (NO RACE VARIABLE): >60 ML/MIN/1.73 M^2
GLUCOSE SERPL-MCNC: 68 MG/DL (ref 70–110)
HCT VFR BLD AUTO: 34 % (ref 37–48.5)
HGB BLD-MCNC: 11.1 G/DL (ref 12–16)
MCH RBC QN AUTO: 28.5 PG (ref 27–31)
MCHC RBC AUTO-ENTMCNC: 32.6 G/DL (ref 32–36)
MCV RBC AUTO: 87 FL (ref 82–98)
PLATELET # BLD AUTO: 239 K/UL (ref 150–450)
PMV BLD AUTO: 10 FL (ref 9.2–12.9)
POTASSIUM SERPL-SCNC: 3.8 MMOL/L (ref 3.5–5.1)
PROT SERPL-MCNC: 6 G/DL (ref 6–8.4)
RBC # BLD AUTO: 3.89 M/UL (ref 4–5.4)
SODIUM SERPL-SCNC: 136 MMOL/L (ref 136–145)
WBC # BLD AUTO: 16.22 K/UL (ref 3.9–12.7)

## 2023-05-03 PROCEDURE — 85027 COMPLETE CBC AUTOMATED: CPT | Performed by: STUDENT IN AN ORGANIZED HEALTH CARE EDUCATION/TRAINING PROGRAM

## 2023-05-03 PROCEDURE — 94799 UNLISTED PULMONARY SVC/PX: CPT

## 2023-05-03 PROCEDURE — 94761 N-INVAS EAR/PLS OXIMETRY MLT: CPT

## 2023-05-03 PROCEDURE — 36415 COLL VENOUS BLD VENIPUNCTURE: CPT | Performed by: STUDENT IN AN ORGANIZED HEALTH CARE EDUCATION/TRAINING PROGRAM

## 2023-05-03 PROCEDURE — 25000003 PHARM REV CODE 250: Performed by: STUDENT IN AN ORGANIZED HEALTH CARE EDUCATION/TRAINING PROGRAM

## 2023-05-03 PROCEDURE — 25000003 PHARM REV CODE 250: Performed by: NURSE PRACTITIONER

## 2023-05-03 PROCEDURE — 80053 COMPREHEN METABOLIC PANEL: CPT | Performed by: STUDENT IN AN ORGANIZED HEALTH CARE EDUCATION/TRAINING PROGRAM

## 2023-05-03 PROCEDURE — 21400001 HC TELEMETRY ROOM

## 2023-05-03 PROCEDURE — 25000242 PHARM REV CODE 250 ALT 637 W/ HCPCS: Performed by: INTERNAL MEDICINE

## 2023-05-03 PROCEDURE — 99900031 HC PATIENT EDUCATION (STAT)

## 2023-05-03 PROCEDURE — 99900035 HC TECH TIME PER 15 MIN (STAT)

## 2023-05-03 PROCEDURE — 25000003 PHARM REV CODE 250: Performed by: HOSPITALIST

## 2023-05-03 PROCEDURE — 94640 AIRWAY INHALATION TREATMENT: CPT

## 2023-05-03 PROCEDURE — 63600175 PHARM REV CODE 636 W HCPCS: Performed by: STUDENT IN AN ORGANIZED HEALTH CARE EDUCATION/TRAINING PROGRAM

## 2023-05-03 RX ORDER — HYDROCODONE BITARTRATE AND ACETAMINOPHEN 10; 325 MG/1; MG/1
1 TABLET ORAL EVERY 4 HOURS PRN
Status: DISCONTINUED | OUTPATIENT
Start: 2023-05-03 | End: 2023-05-04 | Stop reason: HOSPADM

## 2023-05-03 RX ORDER — HYDROCODONE BITARTRATE AND ACETAMINOPHEN 5; 325 MG/1; MG/1
1 TABLET ORAL EVERY 4 HOURS PRN
Status: DISCONTINUED | OUTPATIENT
Start: 2023-05-03 | End: 2023-05-04 | Stop reason: HOSPADM

## 2023-05-03 RX ADMIN — MONTELUKAST 10 MG: 10 TABLET, FILM COATED ORAL at 09:05

## 2023-05-03 RX ADMIN — SPIRONOLACTONE 100 MG: 50 TABLET ORAL at 08:05

## 2023-05-03 RX ADMIN — HYDROCODONE BITARTRATE AND ACETAMINOPHEN 1 TABLET: 10; 325 TABLET ORAL at 05:05

## 2023-05-03 RX ADMIN — AZELASTINE HYDROCHLORIDE 137 MCG: 137 SPRAY, METERED NASAL at 08:05

## 2023-05-03 RX ADMIN — FLUTICASONE PROPIONATE 100 MCG: 50 SPRAY, METERED NASAL at 08:05

## 2023-05-03 RX ADMIN — HYDROCODONE BITARTRATE AND ACETAMINOPHEN 1 TABLET: 5; 325 TABLET ORAL at 01:05

## 2023-05-03 RX ADMIN — BUDESONIDE INHALATION 1 MG: 0.5 SUSPENSION RESPIRATORY (INHALATION) at 08:05

## 2023-05-03 RX ADMIN — SODIUM CHLORIDE, SODIUM LACTATE, POTASSIUM CHLORIDE, AND CALCIUM CHLORIDE: .6; .31; .03; .02 INJECTION, SOLUTION INTRAVENOUS at 03:05

## 2023-05-03 RX ADMIN — BUDESONIDE INHALATION 1 MG: 0.5 SUSPENSION RESPIRATORY (INHALATION) at 09:05

## 2023-05-03 RX ADMIN — HYDROCODONE BITARTRATE AND ACETAMINOPHEN 1 TABLET: 7.5; 325 TABLET ORAL at 12:05

## 2023-05-03 RX ADMIN — DUTASTERIDE 0.5 MG: 0.5 CAPSULE, LIQUID FILLED ORAL at 08:05

## 2023-05-03 RX ADMIN — MINOXIDIL 2.5 MG: 2.5 TABLET ORAL at 09:05

## 2023-05-03 RX ADMIN — HYDROCODONE BITARTRATE AND ACETAMINOPHEN 1 TABLET: 7.5; 325 TABLET ORAL at 08:05

## 2023-05-03 RX ADMIN — HYDROCODONE BITARTRATE AND ACETAMINOPHEN 1 TABLET: 7.5; 325 TABLET ORAL at 04:05

## 2023-05-03 RX ADMIN — Medication 2000 UNITS: at 08:05

## 2023-05-03 NOTE — PLAN OF CARE
[facility]  Discharge Reassessment    Primary Care Provider: Patricia Tse NP    Expected Discharge Date: 5/4/2023      Case Management continuing to follow and will assist with discharge planning as needed. Anticipate DC tomorrow; weaning pain medications and advancing diet today. No surgical plans from gen sx at this time- pt to follow up in clinic       Reassessment (most recent)       Discharge Reassessment - 05/03/23 1257          Discharge Reassessment    Assessment Type Discharge Planning Reassessment     Did the patient's condition or plan change since previous assessment? No     Discharge Plan discussed with: Spouse/sig other;Patient     Communicated LAKESHA with patient/caregiver Yes     Discharge Plan A Home with family     Discharge Plan B Home

## 2023-05-03 NOTE — ASSESSMENT & PLAN NOTE
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

## 2023-05-03 NOTE — TELEPHONE ENCOUNTER
----- Message from Madelaine Amin RN sent at 5/3/2023 12:59 PM CDT -----  Regarding: hospital follow  Good afternoon,     This patient is likely going to DC from [facility] tomorrow and will need a hospital follow up apt scheduled, please. Ideally would like patient seen within 7 days from DC date.. thank you!

## 2023-05-03 NOTE — PROGRESS NOTES
[facility] Medicine  Progress Note    Patient Name: Kaia Nelson  MRN: [id number]  Patient Class: IP- Inpatient   Admission Date: 4/30/2023  Length of Stay: 1 days  Attending Physician: Nicholas Ghotra MD  Primary Care Provider: Patricia Tse NP        Subjective:     Principal Problem:Acute pancreatitis        HPI:  63 yo presents with c/o epigastric pain that radiates to right shoulder. Reports this pain presented 3 weeks ago while in [facility] after a book tour while walking to the car. Reports she started having sharp pain in the epigastric region and  radiated to the right shoulder. Reports the pain was severe but they drove back to [place] not wanting to go to a hospital out of state. Reports by the time they made it home the pain was not gone but slightly dull and she was able to go to bed and rest. Stating the pain has never completely gone away about a week ago the pain got sharp again but not as bas as the initial onset and lasted a couple of hours before getting dull again. The  works out of town and had been gone for 2 weeks and after getting home yesterday she still was not feeling well and he insisted they come to the hospital to be checked out. Reports 2 vomiting episodes with each sharp pain episode. Reports the severe pain has no correlations to food. Denies nausea and vomiting (other than the 2 episodes. No chest pain or shortness of breath and no positive cardiac history.        Overview/Hospital Course:  62F with PMH allergies and androgenic alopecia is admitted with abdominal pain and nausea found to have acute pancreatitis. Vitals stable. Noted elevated lipase, AST, and ALT. CT abd/pelv with fatty atrophy of the pancreas with regional inflammation also with abnormal dilated flaccid gallbladder. US abd showed multiple polypoid foci along the peripheral edge of the gallbladder suggestive of hyperplastic cholesterolosis. Normal triglyceride level. Started on IVF, pain meds,  and IV zosyn. GI and general surgery consulted. Surgery was not deemed necessary inpatient so she was recommended to follow up with general surgery outpatient after this episode of pancreatitis is resolved to consider cholecystectomy. Zosyn discontinued as no obvious infection identified. Diet advanced as tolerated. Pain medications weaned.      Interval History: Patient seen and examined. NAEON. Pain seems to be coming less frequent. Tolerating some diet now. Open to trying low fat diet.       Objective:     Vital Signs (Most Recent):  Temp: 98.2 °F (36.8 °C) (05/03/23 1119)  Pulse: 74 (05/03/23 1119)  Resp: 16 (05/03/23 1300)  BP: (!) 141/72 (05/03/23 1119)  SpO2: 96 % (05/03/23 1119)   Vital Signs (24h Range):  Temp:  [98.2 °F (36.8 °C)-98.6 °F (37 °C)] 98.2 °F (36.8 °C)  Pulse:  [74-80] 74  Resp:  [16-18] 16  SpO2:  [92 %-98 %] 96 %  BP: (121-141)/(67-79) 141/72     Weight: 80.7 kg (178 lb)  Body mass index is 28.73 kg/m².    Intake/Output Summary (Last 24 hours) at 5/3/2023 1414  Last data filed at 5/3/2023 1002  Gross per 24 hour   Intake 120 ml   Output --   Net 120 ml      Physical Exam  Vitals reviewed.   Constitutional:       General: She is not in acute distress.  HENT:      Head: Normocephalic and atraumatic.   Cardiovascular:      Rate and Rhythm: Normal rate and regular rhythm.   Pulmonary:      Effort: Pulmonary effort is normal. No respiratory distress.   Abdominal:      General: Abdomen is flat. Bowel sounds are normal. There is no distension.      Palpations: Abdomen is soft.      Tenderness: There is abdominal tenderness (minimal to mild now) in the epigastric area. There is no guarding or rebound.   Neurological:      General: No focal deficit present.      Mental Status: She is alert and oriented to person, place, and time.   Psychiatric:         Mood and Affect: Affect normal.         Behavior: Behavior normal.         Thought Content: Thought content normal.       Significant Labs: All  pertinent labs within the past 24 hours have been reviewed.    Significant Imaging: I have reviewed all pertinent imaging results/findings within the past 24 hours.      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  CT abd/pelv and US abd reviewed  S/p zosyn  - Adv diet as tolerated; tolerated some FLD now trying low fat  - continue IVF at decreased rate  - pain meds; dc iv options  - consults to GI and gen surg; no surgery inpatient    Androgenic alopecia  Chronic  - continue home spironolactone, dutasteride, and minoxidil    Non-seasonal allergic rhinitis due to pollen  Chronic, stable  - continue home nasal sprays, budesonide neb, and montelukast      VTE Risk Mitigation (From admission, onward)         Ordered     IP VTE LOW RISK PATIENT  Once         04/30/23 1900                Discharge Planning   LAKESHA: 5/4/2023     Code Status: Full Code   Is the patient medically ready for discharge?:     Reason for patient still in hospital (select all that apply): Patient trending condition  Discharge Plan A: Home with family                  Nicholas Ghotra MD  Department of Hospital Medicine   formerly [facility]

## 2023-05-03 NOTE — SUBJECTIVE & OBJECTIVE
Interval History: Patient seen and examined. NAEON. Pain seems to be coming less frequent. Tolerating some diet now. Open to trying low fat diet.       Objective:     Vital Signs (Most Recent):  Temp: 98.2 °F (36.8 °C) (05/03/23 1119)  Pulse: 74 (05/03/23 1119)  Resp: 16 (05/03/23 1300)  BP: (!) 141/72 (05/03/23 1119)  SpO2: 96 % (05/03/23 1119)   Vital Signs (24h Range):  Temp:  [98.2 °F (36.8 °C)-98.6 °F (37 °C)] 98.2 °F (36.8 °C)  Pulse:  [74-80] 74  Resp:  [16-18] 16  SpO2:  [92 %-98 %] 96 %  BP: (121-141)/(67-79) 141/72     Weight: 80.7 kg (178 lb)  Body mass index is 28.73 kg/m².    Intake/Output Summary (Last 24 hours) at 5/3/2023 1414  Last data filed at 5/3/2023 1002  Gross per 24 hour   Intake 120 ml   Output --   Net 120 ml      Physical Exam  Vitals reviewed.   Constitutional:       General: She is not in acute distress.  HENT:      Head: Normocephalic and atraumatic.   Cardiovascular:      Rate and Rhythm: Normal rate and regular rhythm.   Pulmonary:      Effort: Pulmonary effort is normal. No respiratory distress.   Abdominal:      General: Abdomen is flat. Bowel sounds are normal. There is no distension.      Palpations: Abdomen is soft.      Tenderness: There is abdominal tenderness (minimal to mild now) in the epigastric area. There is no guarding or rebound.   Neurological:      General: No focal deficit present.      Mental Status: She is alert and oriented to person, place, and time.   Psychiatric:         Mood and Affect: Affect normal.         Behavior: Behavior normal.         Thought Content: Thought content normal.       Significant Labs: All pertinent labs within the past 24 hours have been reviewed.    Significant Imaging: I have reviewed all pertinent imaging results/findings within the past 24 hours.

## 2023-05-03 NOTE — PLAN OF CARE
05/03/23 0911   Patient Assessment/Suction   Level of Consciousness (AVPU) alert   Respiratory Effort Unlabored;Normal   Expansion/Accessory Muscles/Retractions no use of accessory muscles   All Lung Fields Breath Sounds clear;equal bilaterally   Rhythm/Pattern, Respiratory unlabored;pattern regular;depth regular   Cough Frequency no cough   PRE-TX-O2   Device (Oxygen Therapy) room air   SpO2 98 %   Pulse Oximetry Type Intermittent   $ Pulse Oximetry - Multiple Charge Pulse Oximetry - Multiple   Pulse 76   Resp 16   Aerosol Therapy   $ Aerosol Therapy Charges Aerosol Treatment   Daily Review of Necessity (SVN) completed   Respiratory Treatment Status (SVN) given   Treatment Route (SVN) mask;oxygen   Patient Position (SVN) HOB elevated   Post Treatment Assessment (SVN) breath sounds unchanged   Signs of Intolerance (SVN) none   Breath Sounds Post-Respiratory Treatment   Throughout All Fields Post-Treatment All Fields   Throughout All Fields Post-Treatment Anterior:;Lateral:   Post-treatment Heart Rate (beats/min) 66   Post-treatment Resp Rate (breaths/min) 18   Education   $ Education 15 min;DME Nebulizer   Respiratory Evaluation   $ Care Plan Tech Time 15 min   $ Eval/Re-eval Charges Re-evaluation   Evaluation For Re-Eval 3 day

## 2023-05-04 ENCOUNTER — PATIENT MESSAGE (OUTPATIENT)
Dept: FAMILY MEDICINE | Facility: CLINIC | Age: 63
End: 2023-05-04
Payer: COMMERCIAL

## 2023-05-04 ENCOUNTER — TELEPHONE (OUTPATIENT)
Dept: GASTROENTEROLOGY | Facility: CLINIC | Age: 63
End: 2023-05-04
Payer: COMMERCIAL

## 2023-05-04 VITALS
WEIGHT: 178 LBS | HEIGHT: 66 IN | DIASTOLIC BLOOD PRESSURE: 83 MMHG | SYSTOLIC BLOOD PRESSURE: 127 MMHG | RESPIRATION RATE: 17 BRPM | OXYGEN SATURATION: 96 % | TEMPERATURE: 98 F | HEART RATE: 76 BPM | BODY MASS INDEX: 28.61 KG/M2

## 2023-05-04 LAB
ALBUMIN SERPL BCP-MCNC: 3 G/DL (ref 3.5–5.2)
ALP SERPL-CCNC: 40 U/L (ref 55–135)
ALT SERPL W/O P-5'-P-CCNC: 21 U/L (ref 10–44)
ANION GAP SERPL CALC-SCNC: 10 MMOL/L (ref 8–16)
AST SERPL-CCNC: 14 U/L (ref 10–40)
BILIRUB SERPL-MCNC: 0.8 MG/DL (ref 0.1–1)
BUN SERPL-MCNC: 8 MG/DL (ref 6–20)
CALCIUM SERPL-MCNC: 8.7 MG/DL (ref 8.7–10.5)
CHLORIDE SERPL-SCNC: 103 MMOL/L (ref 95–110)
CO2 SERPL-SCNC: 24 MMOL/L (ref 23–29)
CREAT SERPL-MCNC: 0.6 MG/DL (ref 0.5–1.4)
ERYTHROCYTE [DISTWIDTH] IN BLOOD BY AUTOMATED COUNT: 12.8 % (ref 11.5–14.5)
EST. GFR  (NO RACE VARIABLE): >60 ML/MIN/1.73 M^2
GLUCOSE SERPL-MCNC: 73 MG/DL (ref 70–110)
HCT VFR BLD AUTO: 32.1 % (ref 37–48.5)
HGB BLD-MCNC: 10.7 G/DL (ref 12–16)
MCH RBC QN AUTO: 28.9 PG (ref 27–31)
MCHC RBC AUTO-ENTMCNC: 33.3 G/DL (ref 32–36)
MCV RBC AUTO: 87 FL (ref 82–98)
PLATELET # BLD AUTO: 249 K/UL (ref 150–450)
PMV BLD AUTO: 9.8 FL (ref 9.2–12.9)
POTASSIUM SERPL-SCNC: 3.6 MMOL/L (ref 3.5–5.1)
PROT SERPL-MCNC: 6.1 G/DL (ref 6–8.4)
RBC # BLD AUTO: 3.7 M/UL (ref 4–5.4)
SODIUM SERPL-SCNC: 137 MMOL/L (ref 136–145)
WBC # BLD AUTO: 13.99 K/UL (ref 3.9–12.7)

## 2023-05-04 PROCEDURE — 25000003 PHARM REV CODE 250: Performed by: STUDENT IN AN ORGANIZED HEALTH CARE EDUCATION/TRAINING PROGRAM

## 2023-05-04 PROCEDURE — 99900031 HC PATIENT EDUCATION (STAT)

## 2023-05-04 PROCEDURE — 85027 COMPLETE CBC AUTOMATED: CPT | Performed by: STUDENT IN AN ORGANIZED HEALTH CARE EDUCATION/TRAINING PROGRAM

## 2023-05-04 PROCEDURE — 80053 COMPREHEN METABOLIC PANEL: CPT | Performed by: STUDENT IN AN ORGANIZED HEALTH CARE EDUCATION/TRAINING PROGRAM

## 2023-05-04 PROCEDURE — 36415 COLL VENOUS BLD VENIPUNCTURE: CPT | Performed by: STUDENT IN AN ORGANIZED HEALTH CARE EDUCATION/TRAINING PROGRAM

## 2023-05-04 PROCEDURE — 94761 N-INVAS EAR/PLS OXIMETRY MLT: CPT

## 2023-05-04 PROCEDURE — 25000242 PHARM REV CODE 250 ALT 637 W/ HCPCS: Performed by: INTERNAL MEDICINE

## 2023-05-04 PROCEDURE — 25000003 PHARM REV CODE 250: Performed by: NURSE PRACTITIONER

## 2023-05-04 PROCEDURE — 94640 AIRWAY INHALATION TREATMENT: CPT

## 2023-05-04 RX ORDER — HYDROCODONE BITARTRATE AND ACETAMINOPHEN 5; 325 MG/1; MG/1
1 TABLET ORAL EVERY 6 HOURS PRN
Qty: 20 TABLET | Refills: 0 | Status: SHIPPED | OUTPATIENT
Start: 2023-05-04 | End: 2023-06-02 | Stop reason: ALTCHOICE

## 2023-05-04 RX ADMIN — AZELASTINE HYDROCHLORIDE 137 MCG: 137 SPRAY, METERED NASAL at 08:05

## 2023-05-04 RX ADMIN — HYDROCODONE BITARTRATE AND ACETAMINOPHEN 1 TABLET: 10; 325 TABLET ORAL at 08:05

## 2023-05-04 RX ADMIN — DUTASTERIDE 0.5 MG: 0.5 CAPSULE, LIQUID FILLED ORAL at 08:05

## 2023-05-04 RX ADMIN — HYDROCODONE BITARTRATE AND ACETAMINOPHEN 1 TABLET: 10; 325 TABLET ORAL at 12:05

## 2023-05-04 RX ADMIN — SPIRONOLACTONE 100 MG: 50 TABLET ORAL at 08:05

## 2023-05-04 RX ADMIN — BUDESONIDE INHALATION 1 MG: 0.5 SUSPENSION RESPIRATORY (INHALATION) at 07:05

## 2023-05-04 RX ADMIN — Medication 2000 UNITS: at 08:05

## 2023-05-04 RX ADMIN — FLUTICASONE PROPIONATE 100 MCG: 50 SPRAY, METERED NASAL at 08:05

## 2023-05-04 NOTE — RESPIRATORY THERAPY
05/03/23 2055   Patient Assessment/Suction   Level of Consciousness (AVPU) alert   Respiratory Effort Normal;Unlabored   Expansion/Accessory Muscles/Retractions no use of accessory muscles;no retractions   All Lung Fields Breath Sounds Posterior:;Lateral:;crackles, fine   Rhythm/Pattern, Respiratory no shortness of breath reported;depth regular;pattern regular;unlabored   Cough Frequency no cough   PRE-TX-O2   Device (Oxygen Therapy) room air   Pulse 64   Resp 13   Aerosol Therapy   $ Aerosol Therapy Charges Aerosol Treatment   Daily Review of Necessity (SVN) completed   Respiratory Treatment Status (SVN) given   Treatment Route (SVN) mask;oxygen   Patient Position (SVN) HOB elevated   Post Treatment Assessment (SVN) breath sounds improved   Signs of Intolerance (SVN) none   Breath Sounds Post-Respiratory Treatment   Throughout All Fields Post-Treatment All Fields   Throughout All Fields Post-Treatment Anterior:;Posterior:;Lateral:;no change   Post-treatment Heart Rate (beats/min) 67   Post-treatment Resp Rate (breaths/min) 16

## 2023-05-04 NOTE — TELEPHONE ENCOUNTER
Patient instructed Dr. Garnica does not see pancreas and she should do hospital follow up with Dr. Miles since he saw her in the hospital

## 2023-05-04 NOTE — TELEPHONE ENCOUNTER
----- Message from Celena Arroyo sent at 5/4/2023  1:56 PM CDT -----  Contact: PT at [phone]  Type:  Sooner Appointment Request    Caller is requesting a sooner appointment.  Caller declined first available appointment listed below.  Caller will not accept being placed on the waitlist and is requesting a message be sent to doctor.    Name of Caller:  Pt   When is the first available appointment?  7/12/23  Symptoms:  Hosptial F/U Need to see GI ASAP  Best Call Back Number:  [phone]  Additional Information:  PT is calling to get a sooner appt.  She was just released  from Hospital today.  Please call back to advice.

## 2023-05-04 NOTE — DISCHARGE SUMMARY
[facility]  Discharge Summary      Patient Name: Kaia Nelson  MRN: [id number]  CONSTANTINE: [id number]  Patient Class: IP- Inpatient  Admission Date: 4/30/2023  Hospital Length of Stay: 2 days  Discharge Date and Time:  05/04/2023 11:41 AM  Attending Physician: Nicholas Ghotra MD   Discharging Provider: Nicholas Ghotra MD  Primary Care Provider: Patricia Tse NP    Primary Care Team: Networked reference to record PCT     HPI:   61 yo presents with c/o epigastric pain that radiates to right shoulder. Reports this pain presented 3 weeks ago while in [facility] after a book tour while walking to the car. Reports she started having sharp pain in the epigastric region and  radiated to the right shoulder. Reports the pain was severe but they drove back to [place] not wanting to go to a hospital out of state. Reports by the time they made it home the pain was not gone but slightly dull and she was able to go to bed and rest. Stating the pain has never completely gone away about a week ago the pain got sharp again but not as bas as the initial onset and lasted a couple of hours before getting dull again. The  works out of town and had been gone for 2 weeks and after getting home yesterday she still was not feeling well and he insisted they come to the hospital to be checked out. Reports 2 vomiting episodes with each sharp pain episode. Reports the severe pain has no correlations to food. Denies nausea and vomiting (other than the 2 episodes. No chest pain or shortness of breath and no positive cardiac history.        * No surgery found *      Hospital Course:   62F with PMH allergies and androgenic alopecia is admitted with abdominal pain and nausea found to have acute pancreatitis. Vitals stable. Noted elevated lipase, AST, and ALT. CT abd/pelv with fatty atrophy of the pancreas with regional inflammation also with abnormal dilated flaccid gallbladder. US abd showed multiple polypoid foci along the  peripheral edge of the gallbladder suggestive of hyperplastic cholesterolosis. Normal triglyceride level. Started on IVF, pain meds, and IV zosyn. GI and general surgery consulted. Surgery was not deemed necessary inpatient so she was recommended to follow up with general surgery outpatient after this episode of pancreatitis is resolved to consider cholecystectomy. Zosyn discontinued as no obvious infection identified. Diet advanced as tolerated. Pain medications weaned. She was eventually appropriate for discharge. She is to follow up outpatient with PCP and gen surg. By time of discharge the patient was tolerating a low fat diet with resolving admission symptoms. Patient seen and examined on day of discharge.    Physical exam on day of discharge:  Constitutional:       General: She is not in acute distress.  HENT:      Head: Normocephalic and atraumatic.   Cardiovascular:      Rate and Rhythm: Normal rate and regular rhythm.   Pulmonary:      Effort: Pulmonary effort is normal. No respiratory distress.   Abdominal:      General: Abdomen is flat. Bowel sounds are normal. There is no distension.      Palpations: Abdomen is soft.      Tenderness: There is abdominal tenderness (minimal to mild now) in the epigastric area. There is no guarding or rebound.   Neurological:      General: No focal deficit present.      Mental Status: She is alert and oriented to person, place, and time.   Psychiatric:         Mood and Affect: Affect normal.         Behavior: Behavior normal.         Thought Content: Thought content normal.          Goals of Care Treatment Preferences:  Code Status: Full Code      Consults:   Consults (From admission, onward)        Status Ordering Provider     Inpatient consult to General Surgery  Once        Provider:  Larry Barba MD    Completed NELLY GUO     Inpatient consult to Gastroenterology  Once        Provider:  Denis Miles MD    Completed HAIR LOZADA Assessment &  Plan notes have been filed under this hospital service since the last note was generated.  Service: Hospital Medicine    Final Active Diagnoses:    Diagnosis Date Noted POA    PRINCIPAL PROBLEM:  Acute pancreatitis [K85.90] 04/30/2023 Yes    Androgenic alopecia [L64.9] 05/02/2023 Yes     Chronic    Non-seasonal allergic rhinitis due to pollen [J30.1] 07/07/2017 Yes     Chronic      Problems Resolved During this Admission:    Diagnosis Date Noted Date Resolved POA    Epigastric pain [R10.13] 04/30/2023 05/01/2023 Yes       Discharged Condition: good    Disposition: Home or Self Care    Follow Up:   Follow-up Information     Patricia Tse NP. Schedule an appointment as soon as possible for a visit in 1 week(s).    Specialty: Family Medicine  Why: message sent to office requesting an appointment  Contact information:  [address]  [address]  [place] [address]  [phone]             Larry Barba MD. Schedule an appointment as soon as possible for a visit in 4 week(s).    Specialty: General Surgery  Contact information:  [address]  [address]  [place] [address]  [phone]                       Patient Instructions:      Diet Adult Regular   Order Comments: Low fat as discussed with dietician regarding pancreatitis     Notify your health care provider if you experience any of the following:  temperature >100.4     Notify your health care provider if you experience any of the following:  persistent nausea and vomiting or diarrhea     Notify your health care provider if you experience any of the following:  severe uncontrolled pain     Notify your health care provider if you experience any of the following:  redness, tenderness, or signs of infection (pain, swelling, redness, odor or green/yellow discharge around incision site)     Notify your health care provider if you experience any of the following:  difficulty breathing or increased cough     Notify your health care provider if you experience any of  the following:  severe persistent headache     Notify your health care provider if you experience any of the following:  worsening rash     Notify your health care provider if you experience any of the following:  persistent dizziness, light-headedness, or visual disturbances     Notify your health care provider if you experience any of the following:  increased confusion or weakness     Activity as tolerated       Significant Diagnostic Studies:     Lab Results   Component Value Date    WBC 13.99 (H) 05/04/2023    HGB 10.7 (L) 05/04/2023    HCT 32.1 (L) 05/04/2023    MCV 87 05/04/2023     05/04/2023       BMP  Lab Results   Component Value Date     05/04/2023    K 3.6 05/04/2023     05/04/2023    CO2 24 05/04/2023    BUN 8 05/04/2023    CREATININE 0.6 05/04/2023    CALCIUM 8.7 05/04/2023    ANIONGAP 10 05/04/2023    EGFRNORACEVR >60.0 05/04/2023     Imaging Results          CT Abdomen Pelvis With Contrast (Final result)  Result time 04/30/23 12:27:39    Final result by Franco Chong Jr., MD (04/30/23 12:27:39)                 Narrative:    CT ABDOMEN PELVIS WITH IV CONTRAST:  4/30/2023 11:48 AM CDT    HISTORY:  Epigastric pain.    COMPARISON: Limited abdominal sonogram 4/30/2023    TECHNIQUE: Contrast-enhanced images of the abdomen and pelvis were obtained. Dose lowering techniques were utilized which include adjusting the mA and/or kV to protocol and/or patient size. The patient did not receive oral contrast. 100 mL of Omnipaque 350    ABDOMEN AND PELVIS:  LOWER CHEST: Cardiac chambers maintain normal size and overall configuration. No evidence of significant pericardial fluid.  LUNGS: Mild parenchymal scarring projecting over the diaphragmatic surfaces bilaterally appears more prominent towards the left. No evidence of definable pleural effusion. Small sliding-type hiatal hernia with small towards the stomach projecting above the level of the diaphragmatic hiatus.  LIVER:  Normal with no  focal lesions.  No radiopaque calculi are seen in the gallbladder.  SPLEEN:  Normal.  PANCREAS:  Pancreas reveals significant fatty replacement. Marginal areas of peripancreatic stranding in the region of the pancreatic tail migrating downward along the region of the foramen of Cele and involving the second portion of the duodenum with minimal inflammatory stranding of the mesentery space at the root origin celiac artery. Findings are attributed towards uncomplicated pancreatitis with partial involvement reactive inflammatory changes of the second portion of duodenum.  ADRENAL GLANDS:  Normal.  KIDNEYS:  Normal.  GI TRACT:  Normal. The appendix is not seen.  AORTA / IVC:  The aorta and IVC are normal in caliber.  PROSTATE:  The prostate gland and seminal vesicles are unremarkable.  URINARY BLADDER:  Normal.  OTHER FINDINGS:  None.    LYMPH NODES:  Small benign-appearing retroperitoneal lymph nodes are established. No evidence of global lymphadenopathy.    OSSEOUS STRUCTURES AND SOFT TISSUES:  Normal.    IMPRESSION:    1. Fatty atrophy of the pancreas evidence of fairly marginal zone of inflammatory stranding noted with the region of the pancreatic body and tail partial involvement of the second portion the duodenum with continued stranding in the central mesentery along the region origin the celiac plexus. There is no evidence of organized fluid collection or phlegmonous formations along the length of the inflammatory pancreas.  2. Dilated flaccid gallbladder without evidence of focal pathologic findings. Degree of polypoid focus along the peripheral aspect better defined on the accompanying sonographic evaluation.  3. Small sliding-type hiatus hernia.        Electronically signed by:  Franco Chong MD  4/30/2023 12:27 PM CDT Workstation: [id number]                             US Abdomen Limited (Gallbladder) (Final result)  Result time 04/30/23 11:41:05    Final result by Franco Chong Jr., MD (04/30/23  11:41:05)                 Narrative:    Examination: Limited abdominal sonogram.    CLINICAL HISTORY: Abdominal pain.    COMPARISON: Correlation is made with patient's CT scan of the abdomen pelvis with contrast 4/30/2023.    TECHNIQUE: Real-time sonographic ablation of the patient's right upper quadrant was completed utilizing both gray scale and color flow imaging.    FINDINGS: Liver is normal in size measuring 15.7 cm in the midclavicular line maintaining uniform echogenicity pattern. There is no evidence of mass or cyst throughout the hepatic architecture. No distention of the intrahepatic biliary radicles. Gallbladder is well-distended with evidence of several small polypoid foci outlining gallbladder margin reproducing reverberation artifact along the posterior edge of the polypoid foci indicating hyperplastic cholesterolosis as a contributing factor with evidence of dominant Doppler signal establish along the inferior hyperplastic foci. Maximum wall thickness measures roughly 6 mm though apical margin measures at the apex measures 2 mm. There is no evidence of surrounding pericholecystic fluid. The right kidney measures 10.0 cm in thickness with normal outline. No evidence of hydronephrosis. No free fluid in the pelvis.    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 Further assessment based on the patient's clinical findings is advised. No evidence of acute cholecystitis by imaging inspection. Supplementary evaluation with nuclear medicine HIDA scan may provide additional diagnostic information.    Electronically signed by:  Franco Chong MD  4/30/2023 11:41 AM CDT Workstation: [id number]                             X-Ray Chest AP Portable (Final result)  Result time 04/30/23 10:55:02    Final result by Franco Chong Jr., MD (04/30/23 10:55:02)                 Narrative:    CHEST X-RAY SINGLE  VIEW    HISTORY: Chest pain    PREVIOUS STUDIES: None available    FINDINGS:  The heart size and pulmonary vascularity are within the range of normal.  There is no significant hilar nor mediastinal process.  The aerated lungs are well expanded and clear.  The right and left CP angles are rather sharp.  The osseous structures show nothing unusual.  There is  no detrimental change relative to previous.    IMPRESSION:   Negative chest. NO DETRIMENTAL CHANGE IN THE INTERVAL.    Electronically signed by:  Franco Chong MD  4/30/2023 10:55 AM CDT Workstation: [id number]                                Pending Diagnostic Studies:     None         Medications:  Reconciled Home Medications:      Medication List      START taking these medications    HYDROcodone-acetaminophen 5-325 mg per tablet  Commonly known as: NORCO  Take 1 tablet by mouth every 6 (six) hours as needed for Pain.        CHANGE how you take these medications    azelastine 137 mcg (0.1 %) nasal spray  Commonly known as: ASTELIN  INSTILL ONE SPRAY INTO EACH NOSTRIL TWICE DAILY  What changed:   · how much to take  · how to take this  · when to take this     * fluticasone propionate 50 mcg/actuation nasal spray  Commonly known as: FLONASE  2 sprays (100 mcg total) by Each Nostril route 2 (two) times daily.  What changed: Another medication with the same name was changed. Make sure you understand how and when to take each.     * fluticasone propionate 220 mcg/actuation inhaler  Commonly known as: FLOVENT HFA  2 puffs twice per day  What changed:   · how much to take  · how to take this  · when to take this         * This list has 2 medication(s) that are the same as other medications prescribed for you. Read the directions carefully, and ask your doctor or other care provider to review them with you.            CONTINUE taking these medications    albuterol 90 mcg/actuation inhaler  Commonly known as: PROAIR HFA  Inhale 2 puffs into the lungs every 6 (six)  hours as needed for Wheezing or Shortness of Breath (COUGH).     dutasteride 0.5 mg capsule  Commonly known as: AVODART  Take 0.5 mg by mouth once daily.     minoxidiL 2.5 MG tablet  Commonly known as: LONITEN  Take 1.25 mg by mouth every evening.     montelukast 10 mg tablet  Commonly known as: SINGULAIR  Take 1 tablet (10 mg total) by mouth once daily.     spironolactone 100 MG tablet  Commonly known as: ALDACTONE  Take 100 mg by mouth once daily.     vitamin D 1000 units Tab  Commonly known as: VITAMIN D3  Take 2,000 Units by mouth once daily.            Indwelling Lines/Drains at time of discharge:   Lines/Drains/Airways     None                 Time spent on the discharge of patient: 35 minutes         Nicholas Ghotra MD  Department of Hospital Medicine  [facility]

## 2023-05-04 NOTE — PLAN OF CARE
Patient cleared by CM for DC.     Message sent to PCP's office to contact pt with follow up appt.    05/04/23 1120   Final Note   Assessment Type Final Discharge Note   Anticipated Discharge Disposition Home

## 2023-05-18 ENCOUNTER — OFFICE VISIT (OUTPATIENT)
Dept: SURGERY | Facility: CLINIC | Age: 63
End: 2023-05-18
Payer: COMMERCIAL

## 2023-05-18 VITALS
SYSTOLIC BLOOD PRESSURE: 124 MMHG | WEIGHT: 170.63 LBS | TEMPERATURE: 97 F | DIASTOLIC BLOOD PRESSURE: 76 MMHG | HEIGHT: 66 IN | HEART RATE: 82 BPM | RESPIRATION RATE: 18 BRPM | BODY MASS INDEX: 27.42 KG/M2

## 2023-05-18 DIAGNOSIS — K82.8 ADENOMYOSIS OF GALLBLADDER: Primary | ICD-10-CM

## 2023-05-18 PROCEDURE — 3078F PR MOST RECENT DIASTOLIC BLOOD PRESSURE < 80 MM HG: ICD-10-PCS | Mod: CPTII,S$GLB,, | Performed by: STUDENT IN AN ORGANIZED HEALTH CARE EDUCATION/TRAINING PROGRAM

## 2023-05-18 PROCEDURE — 99213 OFFICE O/P EST LOW 20 MIN: CPT | Mod: S$GLB,,, | Performed by: STUDENT IN AN ORGANIZED HEALTH CARE EDUCATION/TRAINING PROGRAM

## 2023-05-18 PROCEDURE — 99999 PR PBB SHADOW E&M-EST. PATIENT-LVL V: CPT | Mod: PBBFAC,,, | Performed by: STUDENT IN AN ORGANIZED HEALTH CARE EDUCATION/TRAINING PROGRAM

## 2023-05-18 PROCEDURE — 3074F PR MOST RECENT SYSTOLIC BLOOD PRESSURE < 130 MM HG: ICD-10-PCS | Mod: CPTII,S$GLB,, | Performed by: STUDENT IN AN ORGANIZED HEALTH CARE EDUCATION/TRAINING PROGRAM

## 2023-05-18 PROCEDURE — 1111F DSCHRG MED/CURRENT MED MERGE: CPT | Mod: CPTII,S$GLB,, | Performed by: STUDENT IN AN ORGANIZED HEALTH CARE EDUCATION/TRAINING PROGRAM

## 2023-05-18 PROCEDURE — 1159F PR MEDICATION LIST DOCUMENTED IN MEDICAL RECORD: ICD-10-PCS | Mod: CPTII,S$GLB,, | Performed by: STUDENT IN AN ORGANIZED HEALTH CARE EDUCATION/TRAINING PROGRAM

## 2023-05-18 PROCEDURE — 3074F SYST BP LT 130 MM HG: CPT | Mod: CPTII,S$GLB,, | Performed by: STUDENT IN AN ORGANIZED HEALTH CARE EDUCATION/TRAINING PROGRAM

## 2023-05-18 PROCEDURE — 99213 PR OFFICE/OUTPT VISIT, EST, LEVL III, 20-29 MIN: ICD-10-PCS | Mod: S$GLB,,, | Performed by: STUDENT IN AN ORGANIZED HEALTH CARE EDUCATION/TRAINING PROGRAM

## 2023-05-18 PROCEDURE — 1159F MED LIST DOCD IN RCRD: CPT | Mod: CPTII,S$GLB,, | Performed by: STUDENT IN AN ORGANIZED HEALTH CARE EDUCATION/TRAINING PROGRAM

## 2023-05-18 PROCEDURE — 99999 PR PBB SHADOW E&M-EST. PATIENT-LVL V: ICD-10-PCS | Mod: PBBFAC,,, | Performed by: STUDENT IN AN ORGANIZED HEALTH CARE EDUCATION/TRAINING PROGRAM

## 2023-05-18 PROCEDURE — 3078F DIAST BP <80 MM HG: CPT | Mod: CPTII,S$GLB,, | Performed by: STUDENT IN AN ORGANIZED HEALTH CARE EDUCATION/TRAINING PROGRAM

## 2023-05-18 PROCEDURE — 3008F PR BODY MASS INDEX (BMI) DOCUMENTED: ICD-10-PCS | Mod: CPTII,S$GLB,, | Performed by: STUDENT IN AN ORGANIZED HEALTH CARE EDUCATION/TRAINING PROGRAM

## 2023-05-18 PROCEDURE — 3008F BODY MASS INDEX DOCD: CPT | Mod: CPTII,S$GLB,, | Performed by: STUDENT IN AN ORGANIZED HEALTH CARE EDUCATION/TRAINING PROGRAM

## 2023-05-18 PROCEDURE — 1111F PR DISCHARGE MEDS RECONCILED W/ CURRENT OUTPATIENT MED LIST: ICD-10-PCS | Mod: CPTII,S$GLB,, | Performed by: STUDENT IN AN ORGANIZED HEALTH CARE EDUCATION/TRAINING PROGRAM

## 2023-05-18 RX ORDER — SODIUM CHLORIDE 9 MG/ML
INJECTION, SOLUTION INTRAVENOUS CONTINUOUS
Status: CANCELLED | OUTPATIENT
Start: 2023-06-07

## 2023-05-18 NOTE — H&P (VIEW-ONLY)
History & Physical    SUBJECTIVE:     History of Present Illness:  Patient is a 62 y.o. female presents after hospitalization for pancreatitis.  Imaging and labs were not consistent with gallstone etiology.  She did have some polypoid masses on the gallbladder.  She is recovering slowly.  Tolerating a diet.  No pain.  Still feels a little fatigued but this is getting better.  No prior belly operations.  Chief Complaint   Patient presents with    Follow-up       Review of patient's allergies indicates:   Allergen Reactions    Benzonatate Other (See Comments)     dizziness       Current Outpatient Medications   Medication Sig Dispense Refill    azelastine (ASTELIN) 137 mcg (0.1 %) nasal spray INSTILL ONE SPRAY INTO EACH NOSTRIL TWICE DAILY (Patient taking differently: 1 spray by Nasal route 2 (two) times daily. INSTILL ONE SPRAY INTO EACH NOSTRIL TWICE DAILY) 90 mL 4    dutasteride (AVODART) 0.5 mg capsule Take 0.5 mg by mouth once daily.      fluticasone propionate (FLONASE) 50 mcg/actuation nasal spray 2 sprays (100 mcg total) by Each Nostril route 2 (two) times daily. 48 g 4    fluticasone propionate (FLOVENT HFA) 220 mcg/actuation inhaler 2 puffs twice per day (Patient taking differently: Inhale 2 puffs into the lungs 2 (two) times a day. 2 puffs twice per day) 36 g 3    minoxidiL (LONITEN) 2.5 MG tablet Take 1.25 mg by mouth every evening.      montelukast (SINGULAIR) 10 mg tablet Take 1 tablet (10 mg total) by mouth once daily. 90 tablet 4    spironolactone (ALDACTONE) 100 MG tablet Take 100 mg by mouth once daily.      vitamin D (VITAMIN D3) 1000 units Tab Take 2,000 Units by mouth once daily.      albuterol (PROAIR HFA) 90 mcg/actuation inhaler Inhale 2 puffs into the lungs every 6 (six) hours as needed for Wheezing or Shortness of Breath (COUGH). (Patient not taking: Reported on 5/18/2023) 36 g 3    HYDROcodone-acetaminophen (NORCO) 5-325 mg per tablet Take 1 tablet by mouth every 6 (six) hours as needed for  "Pain. (Patient not taking: Reported on 5/18/2023) 20 tablet 0     No current facility-administered medications for this visit.       Past Medical History:   Diagnosis Date    Acute allergic rhinitis due to pollen     Allergy to dust     Chronic cough     Chronic rhinitis     Other pulmonary insufficiency, not elsewhere classified      No past surgical history on file.  Family History   Problem Relation Age of Onset    Asthma Mother     Emphysema Mother     Hypertension Mother     Diabetes Father     Kidney disease Father     Heart failure Maternal Grandmother     Hypertension Maternal Grandmother     Diabetes Maternal Grandfather     Heart failure Maternal Grandfather     Cancer Paternal Grandmother         ovarian     Social History     Tobacco Use    Smoking status: Never    Smokeless tobacco: Never   Substance Use Topics    Alcohol use: No    Drug use: No        Review of Systems:  Review of Systems   Constitutional:  Positive for fatigue. Negative for fever.   HENT: Negative.     Eyes: Negative.    Respiratory: Negative.     Cardiovascular: Negative.    Gastrointestinal: Negative.    Endocrine: Negative.    Genitourinary: Negative.    Musculoskeletal: Negative.    Skin: Negative.    Allergic/Immunologic: Negative.    Neurological: Negative.    Hematological: Negative.    Psychiatric/Behavioral: Negative.       OBJECTIVE:     Vital Signs (Most Recent)  Temp: 97.4 °F (36.3 °C) (05/18/23 0953)  Pulse: 82 (05/18/23 0953)  Resp: 18 (05/18/23 0953)  BP: 124/76 (05/18/23 0953)  5' 6" (1.676 m)  77.4 kg (170 lb 10.2 oz)     Physical Exam:  Physical Exam  Constitutional:       General: She is not in acute distress.     Appearance: Normal appearance. She is not ill-appearing, toxic-appearing or diaphoretic.   HENT:      Head: Normocephalic.      Nose: Nose normal.   Eyes:      Conjunctiva/sclera: Conjunctivae normal.   Cardiovascular:      Rate and Rhythm: Normal rate and regular rhythm.   Pulmonary:      Effort: " Pulmonary effort is normal.   Abdominal:      Palpations: Abdomen is soft.   Musculoskeletal:         General: Normal range of motion.      Cervical back: Normal range of motion.   Skin:     General: Skin is warm.   Neurological:      General: No focal deficit present.      Mental Status: She is alert.   Psychiatric:         Mood and Affect: Mood normal.       Laboratory  Hospitalization labs were again reviewed    Diagnostic Results:  Hospital imaging was reviewed.  Polypoid masses on the gallbladder, nonspecific but favor cholesterolosis.    ASSESSMENT/PLAN:     62-year-old female recently admitted for pancreatitis.  This did not seem to be of a biliary etiology.  She does have gallbladder polyps.    PLAN:  Risks versus benefits of cholecystectomy were again discussed.  The primary reason is to evaluate for possible malignant potential of these gallbladder polyps.  Unlikely that her gallbladder was causing pancreatic issues.  Overall, patient wishes to pursue cholecystectomy.  Discuss lap versus robotic surgery.  Patient did consent to the planned procedure.  Surgery was tentatively scheduled for June 7th.

## 2023-05-18 NOTE — PATIENT INSTRUCTIONS
Your procedure has been scheduled at:    [facility]pre-admit nurse  [phone]  [facility].........................................................pre_admit  [phone]  [place]  [facility]  [address] [facility]    DAY   Wednesday    DATE   June 7, 2023       Someone from the hospital will call you the evening before surgery ( it may be after 5 pm) to let you know what time you need to be at the hospital for surgery.                                               1:  DO NOT EAT OR DRINK ANYTHING AFTER MIDNIGHT THE NIGHT BEFORE SURGERY.     2:  You will need to stop all blood thinners 7 days prior to surgery except Eliquis which is 48 hours.      3:  Pre-admit nurse will go over your medicines and let you know which ones not to take the morning of surgery    4:  Plan to have someone drive you home after you are released from the hospital.  You WILL NOT be able to drive yourself.    5:  If you have any questions or need to change your surgery date, please call [facility] or [facility]  at [phone] or [phone]        AFTER SURGERY:    **You can shower 24-48 hours after surgery, incision can get wet but do not soak. You  may have bandages and steri strips or you may just have glue over the incision with no bandage.  The glue will peel away after a few days, steri strips you can remove after 1 week.   **After surgery you will get pain medication to take every 6 hours.  If you are not getting relief you can take the pain medicine every 4 hours and you can also take 2 tabs  if needed.  Also if you are able to take Ibuprofen you can take 600 mg every 6 hours.  Applying an ice pack for 15-20 minutes 3-4 times a day will be helpful.  **If you have not had a bowel movement within 3 days after surgery you may take a laxative of your choice.  ** Do not lift anything over 5-10 pounds.    You need to have a follow up appointment 2 weeks after surgery, call the office to schedule or  if you have questions or concerns.

## 2023-06-06 ENCOUNTER — ANESTHESIA EVENT (OUTPATIENT)
Dept: SURGERY | Facility: HOSPITAL | Age: 63
End: 2023-06-06
Payer: COMMERCIAL

## 2023-06-07 ENCOUNTER — ANESTHESIA (OUTPATIENT)
Dept: SURGERY | Facility: HOSPITAL | Age: 63
End: 2023-06-07
Payer: COMMERCIAL

## 2023-06-07 ENCOUNTER — HOSPITAL ENCOUNTER (OUTPATIENT)
Facility: HOSPITAL | Age: 63
Discharge: HOME | End: 2023-06-07
Attending: STUDENT IN AN ORGANIZED HEALTH CARE EDUCATION/TRAINING PROGRAM | Admitting: STUDENT IN AN ORGANIZED HEALTH CARE EDUCATION/TRAINING PROGRAM
Payer: COMMERCIAL

## 2023-06-07 VITALS
RESPIRATION RATE: 16 BRPM | HEIGHT: 65 IN | DIASTOLIC BLOOD PRESSURE: 67 MMHG | HEART RATE: 54 BPM | TEMPERATURE: 98 F | SYSTOLIC BLOOD PRESSURE: 138 MMHG | OXYGEN SATURATION: 100 % | WEIGHT: 170 LBS | BODY MASS INDEX: 28.32 KG/M2

## 2023-06-07 DIAGNOSIS — K82.8 ADENOMYOSIS OF GALLBLADDER: Primary | ICD-10-CM

## 2023-06-07 PROCEDURE — 94760 N-INVAS EAR/PLS OXIMETRY 1: CPT

## 2023-06-07 PROCEDURE — 27201423 OPTIME MED/SURG SUP & DEVICES STERILE SUPPLY: Performed by: STUDENT IN AN ORGANIZED HEALTH CARE EDUCATION/TRAINING PROGRAM

## 2023-06-07 PROCEDURE — 63600175 PHARM REV CODE 636 W HCPCS: Performed by: STUDENT IN AN ORGANIZED HEALTH CARE EDUCATION/TRAINING PROGRAM

## 2023-06-07 PROCEDURE — D9220A PRA ANESTHESIA: ICD-10-PCS | Mod: CRNA,,, | Performed by: NURSE ANESTHETIST, CERTIFIED REGISTERED

## 2023-06-07 PROCEDURE — 63600175 PHARM REV CODE 636 W HCPCS: Performed by: NURSE ANESTHETIST, CERTIFIED REGISTERED

## 2023-06-07 PROCEDURE — 71000039 HC RECOVERY, EACH ADD'L HOUR: Performed by: STUDENT IN AN ORGANIZED HEALTH CARE EDUCATION/TRAINING PROGRAM

## 2023-06-07 PROCEDURE — 99900104 DSU ONLY-NO CHARGE-EA ADD'L HR (STAT): Performed by: STUDENT IN AN ORGANIZED HEALTH CARE EDUCATION/TRAINING PROGRAM

## 2023-06-07 PROCEDURE — 71000015 HC POSTOP RECOV 1ST HR: Performed by: STUDENT IN AN ORGANIZED HEALTH CARE EDUCATION/TRAINING PROGRAM

## 2023-06-07 PROCEDURE — 71000033 HC RECOVERY, INTIAL HOUR: Performed by: STUDENT IN AN ORGANIZED HEALTH CARE EDUCATION/TRAINING PROGRAM

## 2023-06-07 PROCEDURE — 94799 UNLISTED PULMONARY SVC/PX: CPT

## 2023-06-07 PROCEDURE — 25000003 PHARM REV CODE 250: Performed by: STUDENT IN AN ORGANIZED HEALTH CARE EDUCATION/TRAINING PROGRAM

## 2023-06-07 PROCEDURE — 25000003 PHARM REV CODE 250: Performed by: ANESTHESIOLOGY

## 2023-06-07 PROCEDURE — 88304 PR  SURG PATH,LEVEL III: ICD-10-PCS | Mod: 26,,, | Performed by: PATHOLOGY

## 2023-06-07 PROCEDURE — 47562 PR LAP,CHOLECYSTECTOMY: ICD-10-PCS | Mod: ,,, | Performed by: STUDENT IN AN ORGANIZED HEALTH CARE EDUCATION/TRAINING PROGRAM

## 2023-06-07 PROCEDURE — 36000711: Performed by: STUDENT IN AN ORGANIZED HEALTH CARE EDUCATION/TRAINING PROGRAM

## 2023-06-07 PROCEDURE — 25000003 PHARM REV CODE 250: Performed by: NURSE ANESTHETIST, CERTIFIED REGISTERED

## 2023-06-07 PROCEDURE — D9220A PRA ANESTHESIA: Mod: ANES,,, | Performed by: ANESTHESIOLOGY

## 2023-06-07 PROCEDURE — D9220A PRA ANESTHESIA: Mod: CRNA,,, | Performed by: NURSE ANESTHETIST, CERTIFIED REGISTERED

## 2023-06-07 PROCEDURE — 99900103 DSU ONLY-NO CHARGE-INITIAL HR (STAT): Performed by: STUDENT IN AN ORGANIZED HEALTH CARE EDUCATION/TRAINING PROGRAM

## 2023-06-07 PROCEDURE — D9220A PRA ANESTHESIA: ICD-10-PCS | Mod: ANES,,, | Performed by: ANESTHESIOLOGY

## 2023-06-07 PROCEDURE — 37000008 HC ANESTHESIA 1ST 15 MINUTES: Performed by: STUDENT IN AN ORGANIZED HEALTH CARE EDUCATION/TRAINING PROGRAM

## 2023-06-07 PROCEDURE — 36000710: Performed by: STUDENT IN AN ORGANIZED HEALTH CARE EDUCATION/TRAINING PROGRAM

## 2023-06-07 PROCEDURE — 63600175 PHARM REV CODE 636 W HCPCS: Performed by: ANESTHESIOLOGY

## 2023-06-07 PROCEDURE — 47562 LAPAROSCOPIC CHOLECYSTECTOMY: CPT | Mod: ,,, | Performed by: STUDENT IN AN ORGANIZED HEALTH CARE EDUCATION/TRAINING PROGRAM

## 2023-06-07 PROCEDURE — 88304 TISSUE EXAM BY PATHOLOGIST: CPT | Performed by: PATHOLOGY

## 2023-06-07 PROCEDURE — 37000009 HC ANESTHESIA EA ADD 15 MINS: Performed by: STUDENT IN AN ORGANIZED HEALTH CARE EDUCATION/TRAINING PROGRAM

## 2023-06-07 PROCEDURE — 88304 TISSUE EXAM BY PATHOLOGIST: CPT | Mod: 26,,, | Performed by: PATHOLOGY

## 2023-06-07 RX ORDER — LIDOCAINE HYDROCHLORIDE 10 MG/ML
0.5 INJECTION, SOLUTION EPIDURAL; INFILTRATION; INTRACAUDAL; PERINEURAL ONCE
Status: DISCONTINUED | OUTPATIENT
Start: 2023-06-07 | End: 2023-06-07 | Stop reason: HOSPADM

## 2023-06-07 RX ORDER — ACETAMINOPHEN 10 MG/ML
INJECTION, SOLUTION INTRAVENOUS
Status: DISCONTINUED | OUTPATIENT
Start: 2023-06-07 | End: 2023-06-07

## 2023-06-07 RX ORDER — HYDROMORPHONE HYDROCHLORIDE 2 MG/ML
0.2 INJECTION, SOLUTION INTRAMUSCULAR; INTRAVENOUS; SUBCUTANEOUS EVERY 5 MIN PRN
Status: DISCONTINUED | OUTPATIENT
Start: 2023-06-07 | End: 2023-06-07 | Stop reason: HOSPADM

## 2023-06-07 RX ORDER — CEFAZOLIN SODIUM 2 G/50ML
2 SOLUTION INTRAVENOUS
Status: COMPLETED | OUTPATIENT
Start: 2023-06-07 | End: 2023-06-07

## 2023-06-07 RX ORDER — SODIUM CHLORIDE, SODIUM LACTATE, POTASSIUM CHLORIDE, CALCIUM CHLORIDE 600; 310; 30; 20 MG/100ML; MG/100ML; MG/100ML; MG/100ML
INJECTION, SOLUTION INTRAVENOUS CONTINUOUS
Status: DISCONTINUED | OUTPATIENT
Start: 2023-06-07 | End: 2023-06-07 | Stop reason: HOSPADM

## 2023-06-07 RX ORDER — ROCURONIUM BROMIDE 10 MG/ML
INJECTION, SOLUTION INTRAVENOUS
Status: DISCONTINUED | OUTPATIENT
Start: 2023-06-07 | End: 2023-06-07

## 2023-06-07 RX ORDER — FENTANYL CITRATE 50 UG/ML
25 INJECTION, SOLUTION INTRAMUSCULAR; INTRAVENOUS EVERY 5 MIN PRN
Status: COMPLETED | OUTPATIENT
Start: 2023-06-07 | End: 2023-06-07

## 2023-06-07 RX ORDER — ONDANSETRON HYDROCHLORIDE 2 MG/ML
INJECTION, SOLUTION INTRAMUSCULAR; INTRAVENOUS
Status: DISCONTINUED | OUTPATIENT
Start: 2023-06-07 | End: 2023-06-07

## 2023-06-07 RX ORDER — MIDAZOLAM HYDROCHLORIDE 1 MG/ML
INJECTION INTRAMUSCULAR; INTRAVENOUS
Status: DISCONTINUED | OUTPATIENT
Start: 2023-06-07 | End: 2023-06-07

## 2023-06-07 RX ORDER — NEOSTIGMINE METHYLSULFATE 1 MG/ML
INJECTION, SOLUTION INTRAVENOUS
Status: DISCONTINUED | OUTPATIENT
Start: 2023-06-07 | End: 2023-06-07

## 2023-06-07 RX ORDER — DEXAMETHASONE SODIUM PHOSPHATE 4 MG/ML
INJECTION, SOLUTION INTRA-ARTICULAR; INTRALESIONAL; INTRAMUSCULAR; INTRAVENOUS; SOFT TISSUE
Status: DISCONTINUED | OUTPATIENT
Start: 2023-06-07 | End: 2023-06-07

## 2023-06-07 RX ORDER — SODIUM CHLORIDE 9 MG/ML
INJECTION, SOLUTION INTRAVENOUS CONTINUOUS
Status: DISCONTINUED | OUTPATIENT
Start: 2023-06-07 | End: 2023-06-07 | Stop reason: HOSPADM

## 2023-06-07 RX ORDER — OXYCODONE AND ACETAMINOPHEN 5; 325 MG/1; MG/1
1 TABLET ORAL EVERY 4 HOURS PRN
Qty: 21 TABLET | Refills: 0 | Status: SHIPPED | OUTPATIENT
Start: 2023-06-07 | End: 2023-07-21

## 2023-06-07 RX ORDER — MEPERIDINE HYDROCHLORIDE 50 MG/ML
12.5 INJECTION INTRAMUSCULAR; INTRAVENOUS; SUBCUTANEOUS ONCE
Status: DISCONTINUED | OUTPATIENT
Start: 2023-06-07 | End: 2023-06-07 | Stop reason: HOSPADM

## 2023-06-07 RX ORDER — DIPHENHYDRAMINE HYDROCHLORIDE 50 MG/ML
25 INJECTION INTRAMUSCULAR; INTRAVENOUS EVERY 6 HOURS PRN
Status: DISCONTINUED | OUTPATIENT
Start: 2023-06-07 | End: 2023-06-07 | Stop reason: HOSPADM

## 2023-06-07 RX ORDER — BUPIVACAINE HYDROCHLORIDE AND EPINEPHRINE 2.5; 5 MG/ML; UG/ML
INJECTION, SOLUTION EPIDURAL; INFILTRATION; INTRACAUDAL; PERINEURAL
Status: DISCONTINUED | OUTPATIENT
Start: 2023-06-07 | End: 2023-06-07 | Stop reason: HOSPADM

## 2023-06-07 RX ORDER — LIDOCAINE HYDROCHLORIDE 20 MG/ML
INJECTION INTRAVENOUS
Status: DISCONTINUED | OUTPATIENT
Start: 2023-06-07 | End: 2023-06-07

## 2023-06-07 RX ORDER — OXYCODONE HYDROCHLORIDE 5 MG/1
5 TABLET ORAL
Status: DISCONTINUED | OUTPATIENT
Start: 2023-06-07 | End: 2023-06-07 | Stop reason: HOSPADM

## 2023-06-07 RX ORDER — PROPOFOL 10 MG/ML
VIAL (ML) INTRAVENOUS
Status: DISCONTINUED | OUTPATIENT
Start: 2023-06-07 | End: 2023-06-07

## 2023-06-07 RX ORDER — FENTANYL CITRATE 50 UG/ML
INJECTION, SOLUTION INTRAMUSCULAR; INTRAVENOUS
Status: DISCONTINUED | OUTPATIENT
Start: 2023-06-07 | End: 2023-06-07

## 2023-06-07 RX ORDER — ONDANSETRON 2 MG/ML
4 INJECTION INTRAMUSCULAR; INTRAVENOUS ONCE
Status: DISCONTINUED | OUTPATIENT
Start: 2023-06-07 | End: 2023-06-07 | Stop reason: HOSPADM

## 2023-06-07 RX ADMIN — PROPOFOL 160 MG: 10 INJECTION, EMULSION INTRAVENOUS at 10:06

## 2023-06-07 RX ADMIN — FENTANYL CITRATE 50 MCG: 50 INJECTION, SOLUTION INTRAMUSCULAR; INTRAVENOUS at 11:06

## 2023-06-07 RX ADMIN — ACETAMINOPHEN 1000 MG: 10 INJECTION, SOLUTION INTRAVENOUS at 10:06

## 2023-06-07 RX ADMIN — DEXAMETHASONE SODIUM PHOSPHATE 4 MG: 4 INJECTION, SOLUTION INTRA-ARTICULAR; INTRALESIONAL; INTRAMUSCULAR; INTRAVENOUS; SOFT TISSUE at 10:06

## 2023-06-07 RX ADMIN — FENTANYL CITRATE 25 MCG: 50 INJECTION, SOLUTION INTRAMUSCULAR; INTRAVENOUS at 11:06

## 2023-06-07 RX ADMIN — HYDROMORPHONE HYDROCHLORIDE 0.2 MG: 2 INJECTION INTRAMUSCULAR; INTRAVENOUS; SUBCUTANEOUS at 11:06

## 2023-06-07 RX ADMIN — FENTANYL CITRATE 50 MCG: 50 INJECTION, SOLUTION INTRAMUSCULAR; INTRAVENOUS at 10:06

## 2023-06-07 RX ADMIN — LIDOCAINE HYDROCHLORIDE 75 MG: 20 INJECTION, SOLUTION INTRAVENOUS at 10:06

## 2023-06-07 RX ADMIN — GLYCOPYRROLATE 0.4 MG: 0.2 INJECTION, SOLUTION INTRAMUSCULAR; INTRAVENOUS at 11:06

## 2023-06-07 RX ADMIN — ROCURONIUM BROMIDE 30 MG: 10 INJECTION, SOLUTION INTRAVENOUS at 10:06

## 2023-06-07 RX ADMIN — MIDAZOLAM HYDROCHLORIDE 2 MG: 1 INJECTION, SOLUTION INTRAMUSCULAR; INTRAVENOUS at 09:06

## 2023-06-07 RX ADMIN — FENTANYL CITRATE 100 MCG: 50 INJECTION, SOLUTION INTRAMUSCULAR; INTRAVENOUS at 09:06

## 2023-06-07 RX ADMIN — ONDANSETRON 4 MG: 2 INJECTION INTRAMUSCULAR; INTRAVENOUS at 10:06

## 2023-06-07 RX ADMIN — CEFAZOLIN SODIUM 2 G: 2 SOLUTION INTRAVENOUS at 10:06

## 2023-06-07 RX ADMIN — HYDROMORPHONE HYDROCHLORIDE 0.2 MG: 2 INJECTION INTRAMUSCULAR; INTRAVENOUS; SUBCUTANEOUS at 12:06

## 2023-06-07 RX ADMIN — SODIUM CHLORIDE, SODIUM GLUCONATE, SODIUM ACETATE, POTASSIUM CHLORIDE AND MAGNESIUM CHLORIDE: 526; 502; 368; 37; 30 INJECTION, SOLUTION INTRAVENOUS at 11:06

## 2023-06-07 RX ADMIN — OXYCODONE HYDROCHLORIDE 5 MG: 5 TABLET ORAL at 11:06

## 2023-06-07 RX ADMIN — SODIUM CHLORIDE, SODIUM GLUCONATE, SODIUM ACETATE, POTASSIUM CHLORIDE AND MAGNESIUM CHLORIDE: 526; 502; 368; 37; 30 INJECTION, SOLUTION INTRAVENOUS at 08:06

## 2023-06-07 RX ADMIN — NEOSTIGMINE METHYLSULFATE 4 MG: 1 INJECTION INTRAVENOUS at 11:06

## 2023-06-07 NOTE — DISCHARGE INSTRUCTIONS
"Discharge Instructions: After Your Surgery/Procedure  Youve just had surgery. During surgery you were given medicine called anesthesia to keep you relaxed and free of pain. After surgery you may have some pain or nausea. This is common. Here are some tips for feeling better and getting well after surgery.     Stay on schedule with your medication.   Going home  Your doctor or nurse will show you how to take care of yourself when you go home. He or she will also answer your questions. Have an adult family member or friend drive you home.      For your safety we recommend these precaution for the first 24 hours after your procedure:  Do not drive or use heavy equipment.  Do not make important decisions or sign legal papers.  Do not drink alcohol.  Have someone stay with you, if needed. He or she can watch for problems and help keep you safe.  Your concentration, balance, coordination, and judgement may be impaired for many hours after anesthesia.  Use caution when ambulating or standing up.     You may feel weak and "washed out" after anesthesia and surgery.      Subtle residual effects of general anesthesia or sedation with regional / local anesthesia can last more than 24 hours.  Rest for the remainder of the day or longer if your Doctor/Surgeon has advised you to do so.  Although you may feel normal within the first 24 hours, your reflexes and mental ability may be impaired without you realizing it.  You may feel dizzy, lightheaded or sleepy for 24 hours or longer.      Be sure to go to all follow-up visits with your doctor. And rest after your surgery for as long as your doctor tells you to.  Coping with pain  If you have pain after surgery, pain medicine will help you feel better. Take it as told, before pain becomes severe. Also, ask your doctor or pharmacist about other ways to control pain. This might be with heat, ice, or relaxation. And follow any other instructions your surgeon or nurse gives you.  Tips " for taking pain medicine  To get the best relief possible, remember these points:  Pain medicines can upset your stomach. Taking them with a little food may help.  Most pain relievers taken by mouth need at least 20 to 30 minutes to start to work.  Taking medicine on a schedule can help you remember to take it. Try to time your medicine so that you can take it before starting an activity. This might be before you get dressed, go for a walk, or sit down for dinner.  Constipation is a common side effect of pain medicines. Call your doctor before taking any medicines such as laxatives or stool softeners to help ease constipation. Also ask if you should skip any foods. Drinking lots of fluids and eating foods such as fruits and vegetables that are high in fiber can also help. Remember, do not take laxatives unless your surgeon has prescribed them.  Drinking alcohol and taking pain medicine can cause dizziness and slow your breathing. It can even be deadly. Do not drink alcohol while taking pain medicine.  Pain medicine can make you react more slowly to things. Do not drive or run machinery while taking pain medicine.  Your health care provider may tell you to take acetaminophen to help ease your pain. Ask him or her how much you are supposed to take each day. Acetaminophen or other pain relievers may interact with your prescription medicines or other over-the-counter (OTC) drugs. Some prescription medicines have acetaminophen and other ingredients. Using both prescription and OTC acetaminophen for pain can cause you to overdose. Read the labels on your OTC medicines with care. This will help you to clearly know the list of ingredients, how much to take, and any warnings. It may also help you not take too much acetaminophen. If you have questions or do not understand the information, ask your pharmacist or health care provider to explain it to you before you take the OTC medicine.  Managing nausea  Some people have an  upset stomach after surgery. This is often because of anesthesia, pain, or pain medicine, or the stress of surgery. These tips will help you handle nausea and eat healthy foods as you get better. If you were on a special food plan before surgery, ask your doctor if you should follow it while you get better. These tips may help:  Do not push yourself to eat. Your body will tell you when to eat and how much.  Start off with clear liquids and soup. They are easier to digest.  Next try semi-solid foods, such as mashed potatoes, applesauce, and gelatin, as you feel ready.  Slowly move to solid foods. Dont eat fatty, rich, or spicy foods at first.  Do not force yourself to have 3 large meals a day. Instead eat smaller amounts more often.  Take pain medicines with a small amount of solid food, such as crackers or toast, to avoid nausea.     Call your surgeon if  You still have pain an hour after taking medicine. The medicine may not be strong enough.  You feel too sleepy, dizzy, or groggy. The medicine may be too strong.  You have side effects like nausea, vomiting, or skin changes, such as rash, itching, or hives.       If you have obstructive sleep apnea  You were given anesthesia medicine during surgery to keep you comfortable and free of pain. After surgery, you may have more apnea spells because of this medicine and other medicines you were given. The spells may last longer than usual.   At home:  Keep using the continuous positive airway pressure (CPAP) device when you sleep. Unless your health care provider tells you not to, use it when you sleep, day or night. CPAP is a common device used to treat obstructive sleep apnea.  Talk with your provider before taking any pain medicine, muscle relaxants, or sedatives. Your provider will tell you about the possible dangers of taking these medicines.  © 6571-5066 [employer]. [address] [place] [address], [place] [address]. All rights reserved. This information is  not intended as a substitute for professional medical care. Always follow your healthcare professional's instructions.           Using an Incentive Spirometer    An incentive spirometer is a device that helps you do deep breathing exercises. These exercises expand your lungs, aid in circulation, and help prevent pneumonia. Deep breathing exercises also help you breathe better and improve the function of your lungs by:  Keeping your lungs clear  Strengthening your breathing muscles  Helping prevent respiratory complications or problems  The incentive spirometer gives you a way to take an active part in recover. A nurse or therapist will teach you breathing exercises. To do these exercises, you will breathe in through your mouth and not your nose. The incentive spirometer only works correctly if you breathe in through your mouth.  Steps to clear lungs  Step 1. Exhale normally. Then, inhale normally.  Relax and breathe out.  Step 2. Place your lips tightly around the mouthpiece.  Make sure the device is upright and not tilted.  Step 3. Inhale as much air as you can through the mouthpiece (don't breath through your nose).  Inhale slowly and deeply.  Hold your breath long enough to keep the balls or disk raised for at least 3 to 5 seconds, or as instructed by your healthcare provider.  Some spirometers have an indicator to let you know that you are breathing in too fast. If the indicator goes off, breathe in more slowly.  Step 4. Repeat the exercise regularly.  Do this exercise every hour while you're awake, or as instructed by your healthcare provider.  If you were taught deep breathing and coughing exercises, do them regularly as instructed by your healthcare provider.            Post op instructions for prevention of DVT  What is deep vein thrombosis?  Deep vein thrombosis (DVT) is the medical term for blood clots in the deep veins of the leg.  These blood clots can be dangerous.  A DVT can block a blood vessel and  keep blood from getting where it needs to go.  Another problem is that the clot can travel to other parts of the body such as the lungs.  A clot that travels to the lungs is called a pulmonary embolus (PE) and can cause serious problems with breathing which can lead to death.  Am I at risk for DVT/PE?  If you are not very active, you are at risk of DVT.  Anyone confined to bed, sitting for long periods of time, recovering from surgery, etc. increases the risk of DVT.  Other risk factors are cancer diagnosis, certain medications, estrogen replacement in any form,older age, obesity, pregnancy, smoking, history of clotting disorders, and dehydration.  How will I know if I have a DVT?  Swelling in the lower leg  Pain  Warmth, redness, hardness or bulging of the vein  If you have any of these symptoms, call your doctors office right away.  Some people will not have any symptoms until the clot moves to the lungs.  What are the symptoms of a PE?  Panting, shortness of breath, or trouble breathing  Sharp, knife-like chest pain when you breathe  Coughing or coughing up blood  Rapid heartbeat  If you have any of these symptoms or get worse quickly, call 911 for emergency treatment.  How can I prevent a DVT?  Avoid long periods of inactivity and dont cross your legs--get up and walk around every hour or so.  Stay active--walking after surgery is highly encouraged.  This means you should get out of the house and walk in the neighborhood.  Going up and down stairs will not impair healing (unless advised against such activity by your doctor).    Drink plenty of noncaffeinated, nonalcoholic fluids each day to prevent dehydration.  Wear special support stockings, if they have been advised by your doctor.  If you travel, stop at least once an hour and walk around.  Avoid smoking (assistance with stopping is available through your healthcare provider)  Always notify your doctor if you are not able to follow the post operative  instructions that are given to you at the time of discharge.  It may be necessary to prescribe one of the medications available to prevent DVT.           We hope your stay was comfortable as you heal now, mend and rest.    For we have enjoyed taking care of you by giving your our best.    And as you get better, by regaining your health and strength;   We count it as a privilege to have served you and hope your time at [facility] was well spent.      Thank  You!!!

## 2023-06-07 NOTE — ANESTHESIA PROCEDURE NOTES
Intubation    Date/Time: 6/7/2023 10:12 AM  Performed by: Lenin Mendez CRNA  Authorized by: Landon Arellano MD     Intubation:     Induction:  Intravenous    Intubated:  Postinduction    Mask Ventilation:  Not attempted    Attempts:  1    Attempted By:  CRNA    Method of Intubation:  Video laryngoscopy    Blade:  Fields 3    Laryngeal View Grade: Grade I - full view of cords      Difficult Airway Encountered?: No      Complications:  None    Airway Device:  Oral endotracheal tube    Airway Device Size:  7.5    Style/Cuff Inflation:  Cuffed    Tube secured:  21    Secured at:  The lips    Placement Verified By:  Capnometry    Complicating Factors:  None    Findings Post-Intubation:  BS equal bilateral

## 2023-06-07 NOTE — TRANSFER OF CARE
"Anesthesia Transfer of Care Note    Patient: Kaia Nelson    Procedure(s) Performed: Procedure(s) (LRB):  ROBOTIC CHOLECYSTECTOMY (N/A)    Patient location: PACU    Anesthesia Type: general    Transport from OR: Transported from OR on 2-3 L/min O2 by NC with adequate spontaneous ventilation    Post pain: adequate analgesia    Post assessment: no apparent anesthetic complications    Post vital signs: stable    Level of consciousness: responds to stimulation    Nausea/Vomiting: no nausea/vomiting    Complications: none    Transfer of care protocol was followed      Last vitals:   Visit Vitals  /72 (BP Location: Right arm, Patient Position: Lying)   Pulse 86   Temp 37.1 °C (98.7 °F) (Oral)   Resp 18   Ht 5' 5" (1.651 m)   Wt 77.1 kg (170 lb)   SpO2 99%   Breastfeeding No   BMI 28.29 kg/m²     "

## 2023-06-07 NOTE — PLAN OF CARE
"Pt awake, alert, oriented. Vital signs stable. Pt states she is "ready to go". Discharge instructions reviewed with pt and pt's spouse and sister. Pt and pt's spouse and sister verbalized understanding. IV removed, catheter intact. Incisions clean, dry, and intact. All belongings returned to patient. Pt transferred to car via wheelchair per VARINDER Elizabeth. Safety maintained.    "

## 2023-06-07 NOTE — ANESTHESIA POSTPROCEDURE EVALUATION
Anesthesia Post Evaluation    Patient: Kaia Nelson    Procedure(s) Performed: Procedure(s) (LRB):  ROBOTIC CHOLECYSTECTOMY (N/A)    Final Anesthesia Type: general      Patient location during evaluation: PACU  Patient participation: Yes- Able to Participate  Level of consciousness: awake and alert and oriented  Post-procedure vital signs: reviewed and stable  Pain management: adequate  Airway patency: patent  PAULINE mitigation strategies: Multimodal analgesia, Extubation while patient is awake, Verification of full reversal of neuromuscular block and Extubation and recovery carried out in lateral, semiupright, or other nonsupine position  PONV status at discharge: No PONV  Anesthetic complications: no      Cardiovascular status: blood pressure returned to baseline  Respiratory status: unassisted, spontaneous ventilation and room air  Hydration status: euvolemic  Follow-up not needed.          Vitals Value Taken Time   /71 06/07/23 1126   Temp 36.5 °C (97.7 °F) 06/07/23 1115   Pulse 82 06/07/23 1128   Resp 16 06/07/23 1126   SpO2 98 % 06/07/23 1128   Vitals shown include unvalidated device data.      No case tracking events are documented in the log.      Pain/Janett Score: No data recorded

## 2023-06-07 NOTE — OP NOTE
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  She did consent to cholecystectomy.  She was taken to the OR, placed supine, general endotracheal anesthesia was induced, the abdomen was prepped and draped in the usual fashion, a time-out was completed.  Access to the abdomen was achieved using Optiview technique in the left upper quadrant.  The abdomen was insufflated to 15 mmHg a scope was inserted.  There was no apparent injury during entry.  Three additional 8 mm robotic trocars were placed across the abdomen under direct visualization and the robot was docked.  The gallbladder was identified.  The dome was grasped and retracted cephalad.  Peritoneum was stripped off the base exposing the cystic duct and artery as they entered the gallbladder.  These were clipped and then divided.  The gallbladder was removed off the fossa using electrocautery, placed in an Endo-Catch bag, and removed from the left upper quadrant entry incision.  The right upper quadrant was irrigated until all effluent was clear and hemostasis was confirmed.  Trocars were removed and insufflation was allowed to escape.  Fascia at the extraction site was closed with an 0 Vicryl suture.  Skin was closed with 4-0 Monocryl.  Dermabond was applied as a dressing.  Patient tolerated the entire procedure without apparent complication, was extubated in the OR,  brought to recovery in stable condition.  All counts were correct.    Significant Surgical Tasks Conducted by the Assistant(s), if Applicable:   Bedside assist, skin closure, bandage application.    Estimated Blood Loss (EBL):  Minimal           Implants: * No implants in log *    Specimens:   Specimen (24h ago, onward)       Start     Ordered    Pending  Specimen to Pathology, Surgery General Surgery  Once        Comments: Pre-op Diagnosis: Adenomyosis of gallbladder [K82.8]Procedure(s):ROBOTIC CHOLECYSTECTOMY Number of specimens: 1Name of specimens: Gallbladder     References:    Click here for ordering Quick Tip   Question Answer Comment   Procedure Type: General Surgery    Specimen Class: Routine/Screening    Which provider would you like to cc? MARY BETH PONCE    Release to patient Immediate        Pending                            Condition: Good    Disposition: PACU - hemodynamically stable.    Attestation: I was present and scrubbed for the entire procedure.

## 2023-06-07 NOTE — PLAN OF CARE
Pt prepped for surgery, ABX at BS, abdomen wiped down. All questions answered, clothes in personal belongings bag at post-op locker. POC explained, v/u, call bell in reach.

## 2023-06-07 NOTE — DISCHARGE SUMMARY
[facility]  Brief Operative Note    Surgery Date: 6/7/2023     Surgeon(s) and Role:     * Mary Beth Ponce MD - Primary    Assisting Surgeon: None    Pre-op Diagnosis:  Adenomyosis of gallbladder [K82.8]    Post-op Diagnosis:  Post-Op Diagnosis Codes:     * Adenomyosis of gallbladder [K82.8]    Procedure(s) (LRB):  ROBOTIC CHOLECYSTECTOMY (N/A)    Anesthesia: General    Operative Findings: Cholecystectomy    Estimated Blood Loss:  minimal         Specimens:   Specimen (24h ago, onward)       Start     Ordered    Pending  Specimen to Pathology, Surgery General Surgery  Once        Comments: Pre-op Diagnosis: Adenomyosis of gallbladder [K82.8]Procedure(s):ROBOTIC CHOLECYSTECTOMY Number of specimens: 1Name of specimens: Gallbladder     References:    Click here for ordering Quick Tip   Question Answer Comment   Procedure Type: General Surgery    Specimen Class: Routine/Screening    Which provider would you like to cc? MARY BETH PONCE    Release to patient Immediate        Pending                      Discharge Note    OUTCOME: Patient tolerated treatment/procedure well without complication and is now ready for discharge.    DISPOSITION: Home or Self Care    FINAL DIAGNOSIS:   adenomyosis of the gallbladder    FOLLOWUP: In clinic    DISCHARGE INSTRUCTIONS:    Discharge Procedure Orders   Diet Adult Regular     Lifting restrictions   Order Comments: No more than 10 lbs     No driving until:   Order Comments: Off narcotics     Notify your health care provider if you experience any of the following:  increased confusion or weakness     Notify your health care provider if you experience any of the following:  persistent dizziness, light-headedness, or visual disturbances     Notify your health care provider if you experience any of the following:  worsening rash     Notify your health care provider if you experience any of the following:  severe persistent headache     Notify your health care provider if  you experience any of the following:  difficulty breathing or increased cough     Notify your health care provider if you experience any of the following:  redness, tenderness, or signs of infection (pain, swelling, redness, odor or green/yellow discharge around incision site)     Notify your health care provider if you experience any of the following:  severe uncontrolled pain     Notify your health care provider if you experience any of the following:  persistent nausea and vomiting or diarrhea     Notify your health care provider if you experience any of the following:  temperature >100.4

## 2023-06-07 NOTE — PLAN OF CARE
VSS. NAD. Resp E/U. Calm and cooperative. Speech appropriate. Tolerating clear liquids. Denies nausea. Pain controlled. IV patent. No swelling or redness. Incisions x4 to abdomen CDI. Pt voided spontaneously. Family notified of patient status. All safety measures taken. Bed in low position. Bedrails up x2. Bed locked. Cleared by Anesthesia.

## 2023-06-07 NOTE — ANESTHESIA PREPROCEDURE EVALUATION
06/07/2023  Kaia Nelson is a 62 y.o., female.      Pre-op Assessment    I have reviewed the NPO Status.   I have reviewed the Medications.     Review of Systems  Anesthesia Hx:  No problems with previous Anesthesia    Social:  Non-Smoker    Cardiovascular:   Exercise tolerance: good ECG has been reviewed.    Pulmonary:  Pulmonary Normal    Renal/:  Renal/ Normal     Neurological:  Neurology Normal    Endocrine:  Endocrine Normal        Physical Exam  General: Well nourished    Airway:  Mallampati: II   Mouth Opening: Normal  TM Distance: Normal  Tongue: Normal  Neck ROM: Normal ROM    Dental:  Intact    Chest/Lungs:  Clear to auscultation, Normal Respiratory Rate        Anesthesia Plan  Type of Anesthesia, risks & benefits discussed:    Anesthesia Type: Gen ETT  Intra-op Monitoring Plan: Standard ASA Monitors  Post Op Pain Control Plan: multimodal analgesia and IV/PO Opioids PRN  Induction:  IV  Airway Plan: Direct, Post-Induction  Informed Consent: Informed consent signed with the Patient and all parties understand the risks and agree with anesthesia plan.  All questions answered. Patient consented to blood products? Yes  ASA Score: 2    Ready For Surgery From Anesthesia Perspective.     .

## 2023-06-14 LAB
FINAL PATHOLOGIC DIAGNOSIS: NORMAL
Lab: NORMAL

## 2023-06-22 ENCOUNTER — OFFICE VISIT (OUTPATIENT)
Dept: SURGERY | Facility: CLINIC | Age: 63
End: 2023-06-22
Payer: COMMERCIAL

## 2023-06-22 VITALS
HEART RATE: 73 BPM | SYSTOLIC BLOOD PRESSURE: 123 MMHG | RESPIRATION RATE: 16 BRPM | HEIGHT: 65 IN | TEMPERATURE: 98 F | DIASTOLIC BLOOD PRESSURE: 77 MMHG | BODY MASS INDEX: 28.25 KG/M2 | WEIGHT: 169.56 LBS

## 2023-06-22 DIAGNOSIS — Z98.890 POST-OPERATIVE STATE: Primary | ICD-10-CM

## 2023-06-22 PROCEDURE — 3074F SYST BP LT 130 MM HG: CPT | Mod: CPTII,S$GLB,, | Performed by: STUDENT IN AN ORGANIZED HEALTH CARE EDUCATION/TRAINING PROGRAM

## 2023-06-22 PROCEDURE — 3078F PR MOST RECENT DIASTOLIC BLOOD PRESSURE < 80 MM HG: ICD-10-PCS | Mod: CPTII,S$GLB,, | Performed by: STUDENT IN AN ORGANIZED HEALTH CARE EDUCATION/TRAINING PROGRAM

## 2023-06-22 PROCEDURE — 3074F PR MOST RECENT SYSTOLIC BLOOD PRESSURE < 130 MM HG: ICD-10-PCS | Mod: CPTII,S$GLB,, | Performed by: STUDENT IN AN ORGANIZED HEALTH CARE EDUCATION/TRAINING PROGRAM

## 2023-06-22 PROCEDURE — 1159F PR MEDICATION LIST DOCUMENTED IN MEDICAL RECORD: ICD-10-PCS | Mod: CPTII,S$GLB,, | Performed by: STUDENT IN AN ORGANIZED HEALTH CARE EDUCATION/TRAINING PROGRAM

## 2023-06-22 PROCEDURE — 99024 POSTOP FOLLOW-UP VISIT: CPT | Mod: S$GLB,,, | Performed by: STUDENT IN AN ORGANIZED HEALTH CARE EDUCATION/TRAINING PROGRAM

## 2023-06-22 PROCEDURE — 1159F MED LIST DOCD IN RCRD: CPT | Mod: CPTII,S$GLB,, | Performed by: STUDENT IN AN ORGANIZED HEALTH CARE EDUCATION/TRAINING PROGRAM

## 2023-06-22 PROCEDURE — 99999 PR PBB SHADOW E&M-EST. PATIENT-LVL III: ICD-10-PCS | Mod: PBBFAC,,, | Performed by: STUDENT IN AN ORGANIZED HEALTH CARE EDUCATION/TRAINING PROGRAM

## 2023-06-22 PROCEDURE — 3078F DIAST BP <80 MM HG: CPT | Mod: CPTII,S$GLB,, | Performed by: STUDENT IN AN ORGANIZED HEALTH CARE EDUCATION/TRAINING PROGRAM

## 2023-06-22 PROCEDURE — 3008F BODY MASS INDEX DOCD: CPT | Mod: CPTII,S$GLB,, | Performed by: STUDENT IN AN ORGANIZED HEALTH CARE EDUCATION/TRAINING PROGRAM

## 2023-06-22 PROCEDURE — 99999 PR PBB SHADOW E&M-EST. PATIENT-LVL III: CPT | Mod: PBBFAC,,, | Performed by: STUDENT IN AN ORGANIZED HEALTH CARE EDUCATION/TRAINING PROGRAM

## 2023-06-22 PROCEDURE — 99024 PR POST-OP FOLLOW-UP VISIT: ICD-10-PCS | Mod: S$GLB,,, | Performed by: STUDENT IN AN ORGANIZED HEALTH CARE EDUCATION/TRAINING PROGRAM

## 2023-06-22 PROCEDURE — 3008F PR BODY MASS INDEX (BMI) DOCUMENTED: ICD-10-PCS | Mod: CPTII,S$GLB,, | Performed by: STUDENT IN AN ORGANIZED HEALTH CARE EDUCATION/TRAINING PROGRAM

## 2023-06-22 NOTE — PROGRESS NOTES
Postop note     Patient underwent cholecystectomy.  Doing well.      Incisions are well approximated   Small amount of bruising at the left upper quadrant incision    Pathology:  Benign gallbladder    Overall doing well.  Follow up as needed.

## 2023-06-30 DIAGNOSIS — Z87.19 HX OF PANCREATITIS: Primary | ICD-10-CM

## 2023-06-30 DIAGNOSIS — K86.89 PANCREATIC ATROPHY: ICD-10-CM

## 2023-06-30 DIAGNOSIS — Z87.19 HX OF ACUTE PANCREATITIS: Primary | ICD-10-CM

## 2023-07-13 ENCOUNTER — OFFICE VISIT (OUTPATIENT)
Dept: FAMILY MEDICINE | Facility: CLINIC | Age: 63
End: 2023-07-13
Payer: COMMERCIAL

## 2023-07-13 VITALS
BODY MASS INDEX: 27.82 KG/M2 | WEIGHT: 167 LBS | HEART RATE: 70 BPM | TEMPERATURE: 98 F | DIASTOLIC BLOOD PRESSURE: 86 MMHG | HEIGHT: 65 IN | SYSTOLIC BLOOD PRESSURE: 118 MMHG | OXYGEN SATURATION: 95 %

## 2023-07-13 DIAGNOSIS — Z13.220 SCREENING FOR HYPERLIPIDEMIA: ICD-10-CM

## 2023-07-13 DIAGNOSIS — Z00.00 ANNUAL PHYSICAL EXAM: Primary | ICD-10-CM

## 2023-07-13 DIAGNOSIS — Z87.898 HISTORY OF ABNORMAL MAMMOGRAM: ICD-10-CM

## 2023-07-13 DIAGNOSIS — R53.83 FATIGUE, UNSPECIFIED TYPE: ICD-10-CM

## 2023-07-13 DIAGNOSIS — Z13.1 SCREENING FOR DIABETES MELLITUS: ICD-10-CM

## 2023-07-13 DIAGNOSIS — R13.19 ESOPHAGEAL DYSPHAGIA: ICD-10-CM

## 2023-07-13 DIAGNOSIS — Z23 ENCOUNTER FOR IMMUNIZATION: ICD-10-CM

## 2023-07-13 DIAGNOSIS — Z12.31 ENCOUNTER FOR SCREENING MAMMOGRAM FOR MALIGNANT NEOPLASM OF BREAST: ICD-10-CM

## 2023-07-13 DIAGNOSIS — R92.8 ABNORMAL MAMMOGRAM OF RIGHT BREAST: ICD-10-CM

## 2023-07-13 DIAGNOSIS — J98.4 SMALL AIRWAYS DISEASE: ICD-10-CM

## 2023-07-13 DIAGNOSIS — R05.3 CHRONIC COUGH: ICD-10-CM

## 2023-07-13 DIAGNOSIS — L64.9 ANDROGENIC ALOPECIA: ICD-10-CM

## 2023-07-13 PROCEDURE — 3008F BODY MASS INDEX DOCD: CPT | Mod: CPTII,S$GLB,,

## 2023-07-13 PROCEDURE — 99386 PREV VISIT NEW AGE 40-64: CPT | Mod: 25,S$GLB,,

## 2023-07-13 PROCEDURE — 99386 PR PREVENTIVE VISIT,NEW,40-64: ICD-10-PCS | Mod: 25,S$GLB,,

## 2023-07-13 PROCEDURE — 90471 PNEUMOCOCCAL CONJUGATE VACCINE 20-VALENT: ICD-10-PCS | Mod: S$GLB,,,

## 2023-07-13 PROCEDURE — 1160F RVW MEDS BY RX/DR IN RCRD: CPT | Mod: CPTII,S$GLB,,

## 2023-07-13 PROCEDURE — 90471 IMMUNIZATION ADMIN: CPT | Mod: S$GLB,,,

## 2023-07-13 PROCEDURE — 3079F DIAST BP 80-89 MM HG: CPT | Mod: CPTII,S$GLB,,

## 2023-07-13 PROCEDURE — 90677 PCV20 VACCINE IM: CPT | Mod: S$GLB,,,

## 2023-07-13 PROCEDURE — 3074F PR MOST RECENT SYSTOLIC BLOOD PRESSURE < 130 MM HG: ICD-10-PCS | Mod: CPTII,S$GLB,,

## 2023-07-13 PROCEDURE — 1160F PR REVIEW ALL MEDS BY PRESCRIBER/CLIN PHARMACIST DOCUMENTED: ICD-10-PCS | Mod: CPTII,S$GLB,,

## 2023-07-13 PROCEDURE — 3079F PR MOST RECENT DIASTOLIC BLOOD PRESSURE 80-89 MM HG: ICD-10-PCS | Mod: CPTII,S$GLB,,

## 2023-07-13 PROCEDURE — 1159F PR MEDICATION LIST DOCUMENTED IN MEDICAL RECORD: ICD-10-PCS | Mod: CPTII,S$GLB,,

## 2023-07-13 PROCEDURE — 1159F MED LIST DOCD IN RCRD: CPT | Mod: CPTII,S$GLB,,

## 2023-07-13 PROCEDURE — 3074F SYST BP LT 130 MM HG: CPT | Mod: CPTII,S$GLB,,

## 2023-07-13 PROCEDURE — 90677 PNEUMOCOCCAL CONJUGATE VACCINE 20-VALENT: ICD-10-PCS | Mod: S$GLB,,,

## 2023-07-13 PROCEDURE — 3008F PR BODY MASS INDEX (BMI) DOCUMENTED: ICD-10-PCS | Mod: CPTII,S$GLB,,

## 2023-07-13 RX ORDER — PREDNISOLONE ACETATE 10 MG/ML
SUSPENSION/ DROPS OPHTHALMIC
COMMUNITY
Start: 2023-07-12 | End: 2024-01-15

## 2023-07-13 RX ORDER — DOXYCYCLINE HYCLATE 100 MG
100 TABLET ORAL 2 TIMES DAILY
COMMUNITY
Start: 2023-07-12 | End: 2024-01-15

## 2023-07-13 NOTE — PROGRESS NOTES
Subjective:       Patient ID: Kaia Nelson is a 62 y.o. female.    Chief Complaint: Follow-up    Kaia Nelson is a 62 y.o. female patient who is new to me that presents to clinic to establish care. She reports a recent episode of pancreatitis for which she was hospitalized.  She had and ultrasound that revealed multiple polypoid foci along the peripheral edge of the gallbladder leading to cholecystectomy. She has been discharged from [facility] care. She is followed by Dr. Miles who is planning on doing a Colonoscopy.    Androgenic Alopecia: Followed by Dr. Charlene Eckert with Dermatology. She is taking spironolactone, dutasteride, and minoxidil. Hair loss has been improving. .   Chronic allergic rhinitis and small air disease.  She is followed by Dr. Corado.  She is taking Flonase, Flovent, and singulair which do help. Has follow up with Dr. Corado at end of September.  Abnormal mammogram of right breast in past with biopsy that was negative. Has been doing yearly mammograms that have been good.   Esophageal dysphagia following suspected food poisoning and vomiting.  This has not been a problem since that time.      Surgical history  Robot-assisted Cholecystectomy    Family History  Mother (alive) asthma, emphysema, hypertension  Father () diabetes, kidney disease  2 sisters melanoma  Maternal Grandmother () heart failure, hypertension  Maternal Grandfather () diabetes, heart failure  Paternal Grandmother () ovarian cancer  Paternal Grandfather () hip fracture    Social  Tobacco: never  ETOH: never    Retired        Review of patient's allergies indicates:   Allergen Reactions    Benzonatate Other (See Comments)     dizziness     Social Determinants of Health     Tobacco Use: Low Risk     Smoking Tobacco Use: Never    Smokeless Tobacco Use: Never    Passive Exposure: Not on file   Alcohol Use: Unknown    Frequency of Alcohol Consumption: Patient refused    Average Number  of Drinks: Patient refused    Frequency of Binge Drinking: Patient refused   Financial Resource Strain: Unknown    Difficulty of Paying Living Expenses: Patient refused   Food Insecurity: No Food Insecurity    Worried About Running Out of Food in the Last Year: Never true    Ran Out of Food in the Last Year: Never true   Transportation Needs: No Transportation Needs    Lack of Transportation (Medical): No    Lack of Transportation (Non-Medical): No   Physical Activity: Unknown    Days of Exercise per Week: Patient refused    Minutes of Exercise per Session: Patient refused   Stress: Unknown    Feeling of Stress : Patient refused   Social Connections: Unknown    Frequency of Communication with Friends and Family: Patient refused    Frequency of Social Gatherings with Friends and Family: Patient refused    Attends Buddhist Services: More than 4 times per year    Active Member of Clubs or Organizations: Patient refused    Attends Club or Organization Meetings: Patient refused    Marital Status:    Housing Stability: Unknown    Unable to Pay for Housing in the Last Year: No    Number of Places Lived in the Last Year: Not on file    Unstable Housing in the Last Year: No   Depression: Low Risk     Last PHQ-4: Flowsheet Data: 0      Past Medical History:   Diagnosis Date    Acute allergic rhinitis due to pollen     Allergy to dust     Chronic cough     Chronic rhinitis     Other pulmonary insufficiency, not elsewhere classified       Past Surgical History:   Procedure Laterality Date    ROBOT-ASSISTED CHOLECYSTECTOMY N/A 6/7/2023    Procedure: ROBOTIC CHOLECYSTECTOMY;  Surgeon: Larry Barba MD;  Location: [facility];  Service: General;  Laterality: N/A;      Social History     Socioeconomic History    Marital status:          Current Outpatient Medications:     azelastine (ASTELIN) 137 mcg (0.1 %) nasal spray, INSTILL ONE SPRAY INTO EACH NOSTRIL TWICE DAILY (Patient taking differently: 1 spray by Nasal route  2 (two) times daily. INSTILL ONE SPRAY INTO EACH NOSTRIL TWICE DAILY), Disp: 90 mL, Rfl: 4    doxycycline (VIBRA-TABS) 100 MG tablet, Take 100 mg by mouth 2 (two) times daily., Disp: , Rfl:     dutasteride (AVODART) 0.5 mg capsule, Take 0.5 mg by mouth once daily., Disp: , Rfl:     fluticasone propionate (FLONASE) 50 mcg/actuation nasal spray, 2 sprays (100 mcg total) by Each Nostril route 2 (two) times daily., Disp: 48 g, Rfl: 4    fluticasone propionate (FLOVENT HFA) 220 mcg/actuation inhaler, 2 puffs twice per day (Patient taking differently: Inhale 2 puffs into the lungs 2 (two) times a day. 2 puffs twice per day), Disp: 36 g, Rfl: 3    minoxidiL (LONITEN) 2.5 MG tablet, Take 1.25 mg by mouth every evening., Disp: , Rfl:     montelukast (SINGULAIR) 10 mg tablet, Take 1 tablet (10 mg total) by mouth once daily., Disp: 90 tablet, Rfl: 4    oxyCODONE-acetaminophen (PERCOCET) 5-325 mg per tablet, Take 1 tablet by mouth every 4 (four) hours as needed., Disp: 21 tablet, Rfl: 0    prednisoLONE acetate (PRED FORTE) 1 % DrpS, Place into both eyes., Disp: , Rfl:     spironolactone (ALDACTONE) 100 MG tablet, Take 100 mg by mouth once daily., Disp: , Rfl:     vitamin D (VITAMIN D3) 1000 units Tab, Take 2,000 Units by mouth once daily., Disp: , Rfl:     Lab Results   Component Value Date    WBC 13.99 (H) 05/04/2023    HGB 10.7 (L) 05/04/2023    HCT 32.1 (L) 05/04/2023     05/04/2023    CHOL 213 (H) 04/09/2021    TRIG 60 04/30/2023    HDL 44 04/09/2021    ALT 21 05/04/2023    AST 14 05/04/2023     05/04/2023    K 3.6 05/04/2023     05/04/2023    CREATININE 0.6 05/04/2023    BUN 8 05/04/2023    CO2 24 05/04/2023    TSH 3.080 04/30/2023    INR 1.0 04/30/2023    HGBA1C 5.9 04/09/2021       Review of Systems   Constitutional:  Negative for chills and fever.   Respiratory:  Negative for chest tightness and shortness of breath.    Cardiovascular:  Negative for chest pain, palpitations and leg swelling.    Gastrointestinal:  Positive for abdominal pain. Negative for diarrhea, nausea and vomiting.        Still has some abdominal discomfort since having pancreatitis and gall bladder removed.      Objective:      Physical Exam  Vitals reviewed.   Constitutional:       Appearance: Normal appearance.   HENT:      Head: Normocephalic and atraumatic.      Right Ear: Tympanic membrane normal.      Left Ear: Tympanic membrane normal.      Nose: Nose normal.      Mouth/Throat:      Mouth: Mucous membranes are moist.   Eyes:      Pupils: Pupils are equal, round, and reactive to light.   Neck:      Vascular: No carotid bruit.   Cardiovascular:      Rate and Rhythm: Normal rate and regular rhythm.      Pulses: Normal pulses.           Radial pulses are 2+ on the right side and 2+ on the left side.        Dorsalis pedis pulses are 2+ on the right side and 2+ on the left side.      Heart sounds: Normal heart sounds, S1 normal and S2 normal.   Pulmonary:      Effort: Pulmonary effort is normal.      Breath sounds: Normal breath sounds.   Abdominal:      General: Abdomen is flat. Bowel sounds are normal.      Palpations: Abdomen is soft.      Tenderness: There is no abdominal tenderness.   Musculoskeletal:         General: Normal range of motion.      Cervical back: Normal range of motion and neck supple.      Right lower leg: No edema.      Left lower leg: No edema.   Lymphadenopathy:      Cervical: No cervical adenopathy.   Skin:     General: Skin is warm and dry.      Capillary Refill: Capillary refill takes less than 2 seconds.   Neurological:      Mental Status: She is alert and oriented to person, place, and time.   Psychiatric:         Mood and Affect: Mood normal.         Behavior: Behavior normal.         Thought Content: Thought content normal.         Judgment: Judgment normal.       Assessment:       1. Annual physical exam    2. Small airways disease    3. Esophageal dysphagia    4. Androgenic alopecia    5. History of  abnormal mammogram    6. Abnormal mammogram of right breast    7. Chronic cough    8. Screening for hyperlipidemia    9. Screening for diabetes mellitus    10. Fatigue, unspecified type    11. Encounter for screening mammogram for malignant neoplasm of breast    12. Encounter for immunization        Plan:       Kaia was seen today for follow-up.    Diagnoses and all orders for this visit:    Annual physical exam  -     Comprehensive Metabolic Panel; Future  -     CBC Auto Differential; Future  -     HIV 1/2 Ag/Ab (4th Gen); Future  -     Comprehensive Metabolic Panel  -     CBC Auto Differential  -     HIV 1/2 Ag/Ab (4th Gen)    Small airways disease    Esophageal dysphagia    Androgenic alopecia    History of abnormal mammogram  -     Mammo Digital Screening Bilat w/ Haile; Future    Abnormal mammogram of right breast    Chronic cough    Screening for hyperlipidemia  -     Lipid Panel; Future  -     Lipid Panel    Screening for diabetes mellitus  -     Hemoglobin A1C; Future  -     Hemoglobin A1C    Fatigue, unspecified type  -     TSH; Future  -     TSH    Encounter for screening mammogram for malignant neoplasm of breast  -     Mammo Digital Screening Bilat w/ Haile; Future    Encounter for immunization  -     (In Office Administered) Pneumococcal Conjugate Vaccine (20 Valent) (IM)    Have fasting labs.  Pneumonia vaccine today.  Follow up with Dr. Corado as recommended.  Follow up with Dr. Miles as scheduled. Have colonoscopy  Follow up with Dermatology as recommended.   Follow up in 6 months with Dr. Ramos to establish care.

## 2023-07-18 LAB
ALBUMIN SERPL-MCNC: 4.5 G/DL (ref 3.6–5.1)
ALBUMIN/GLOB SERPL: 2 (CALC) (ref 1–2.5)
ALP SERPL-CCNC: 60 U/L (ref 37–153)
ALT SERPL-CCNC: 9 U/L (ref 6–29)
AST SERPL-CCNC: 13 U/L (ref 10–35)
BASOPHILS # BLD AUTO: 52 CELLS/UL (ref 0–200)
BASOPHILS NFR BLD AUTO: 0.7 %
BILIRUB SERPL-MCNC: 0.4 MG/DL (ref 0.2–1.2)
BUN SERPL-MCNC: 12 MG/DL (ref 7–25)
BUN/CREAT SERPL: NORMAL (CALC) (ref 6–22)
CALCIUM SERPL-MCNC: 10 MG/DL (ref 8.6–10.4)
CHLORIDE SERPL-SCNC: 105 MMOL/L (ref 98–110)
CHOLEST SERPL-MCNC: 201 MG/DL
CHOLEST/HDLC SERPL: 4.3 (CALC)
CO2 SERPL-SCNC: 27 MMOL/L (ref 20–32)
CREAT SERPL-MCNC: 0.71 MG/DL (ref 0.5–1.05)
EGFR: 97 ML/MIN/1.73M2
EOSINOPHIL # BLD AUTO: 377 CELLS/UL (ref 15–500)
EOSINOPHIL NFR BLD AUTO: 5.1 %
ERYTHROCYTE [DISTWIDTH] IN BLOOD BY AUTOMATED COUNT: 13.2 % (ref 11–15)
GLOBULIN SER CALC-MCNC: 2.3 G/DL (CALC) (ref 1.9–3.7)
GLUCOSE SERPL-MCNC: 90 MG/DL (ref 65–99)
HBA1C MFR BLD: 5.6 % OF TOTAL HGB
HCT VFR BLD AUTO: 40.4 % (ref 35–45)
HDLC SERPL-MCNC: 47 MG/DL
HGB BLD-MCNC: 12.8 G/DL (ref 11.7–15.5)
HIV 1+2 AB+HIV1 P24 AG SERPL QL IA: NORMAL
LDLC SERPL CALC-MCNC: 132 MG/DL (CALC)
LYMPHOCYTES # BLD AUTO: 1325 CELLS/UL (ref 850–3900)
LYMPHOCYTES NFR BLD AUTO: 17.9 %
MCH RBC QN AUTO: 28.1 PG (ref 27–33)
MCHC RBC AUTO-ENTMCNC: 31.7 G/DL (ref 32–36)
MCV RBC AUTO: 88.8 FL (ref 80–100)
MONOCYTES # BLD AUTO: 459 CELLS/UL (ref 200–950)
MONOCYTES NFR BLD AUTO: 6.2 %
NEUTROPHILS # BLD AUTO: 5187 CELLS/UL (ref 1500–7800)
NEUTROPHILS NFR BLD AUTO: 70.1 %
NONHDLC SERPL-MCNC: 154 MG/DL (CALC)
PLATELET # BLD AUTO: 297 THOUSAND/UL (ref 140–400)
PMV BLD REES-ECKER: 10.2 FL (ref 7.5–12.5)
POTASSIUM SERPL-SCNC: 4.5 MMOL/L (ref 3.5–5.3)
PROT SERPL-MCNC: 6.8 G/DL (ref 6.1–8.1)
RBC # BLD AUTO: 4.55 MILLION/UL (ref 3.8–5.1)
SODIUM SERPL-SCNC: 141 MMOL/L (ref 135–146)
TRIGL SERPL-MCNC: 117 MG/DL
TSH SERPL-ACNC: 5.47 MIU/L (ref 0.4–4.5)
WBC # BLD AUTO: 7.4 THOUSAND/UL (ref 3.8–10.8)

## 2023-07-20 NOTE — PROGRESS NOTES
Thyroid level is high which is consistent with hypothyroidism. Cholesterol is a little elevated. Please scheduled appointment to discuss treatment.

## 2023-07-21 ENCOUNTER — OFFICE VISIT (OUTPATIENT)
Dept: FAMILY MEDICINE | Facility: CLINIC | Age: 63
End: 2023-07-21
Payer: COMMERCIAL

## 2023-07-21 VITALS
BODY MASS INDEX: 27.63 KG/M2 | WEIGHT: 165.81 LBS | SYSTOLIC BLOOD PRESSURE: 102 MMHG | HEIGHT: 65 IN | RESPIRATION RATE: 18 BRPM | TEMPERATURE: 98 F | OXYGEN SATURATION: 98 % | HEART RATE: 71 BPM | DIASTOLIC BLOOD PRESSURE: 80 MMHG

## 2023-07-21 DIAGNOSIS — Z12.11 SCREEN FOR COLON CANCER: ICD-10-CM

## 2023-07-21 DIAGNOSIS — E03.9 HYPOTHYROIDISM, UNSPECIFIED TYPE: Primary | ICD-10-CM

## 2023-07-21 PROCEDURE — 3044F PR MOST RECENT HEMOGLOBIN A1C LEVEL <7.0%: ICD-10-PCS | Mod: CPTII,S$GLB,,

## 2023-07-21 PROCEDURE — 1159F MED LIST DOCD IN RCRD: CPT | Mod: CPTII,S$GLB,,

## 2023-07-21 PROCEDURE — 3044F HG A1C LEVEL LT 7.0%: CPT | Mod: CPTII,S$GLB,,

## 2023-07-21 PROCEDURE — 3079F DIAST BP 80-89 MM HG: CPT | Mod: CPTII,S$GLB,,

## 2023-07-21 PROCEDURE — 3074F SYST BP LT 130 MM HG: CPT | Mod: CPTII,S$GLB,,

## 2023-07-21 PROCEDURE — 3074F PR MOST RECENT SYSTOLIC BLOOD PRESSURE < 130 MM HG: ICD-10-PCS | Mod: CPTII,S$GLB,,

## 2023-07-21 PROCEDURE — 99214 OFFICE O/P EST MOD 30 MIN: CPT | Mod: S$GLB,,,

## 2023-07-21 PROCEDURE — 1160F PR REVIEW ALL MEDS BY PRESCRIBER/CLIN PHARMACIST DOCUMENTED: ICD-10-PCS | Mod: CPTII,S$GLB,,

## 2023-07-21 PROCEDURE — 1159F PR MEDICATION LIST DOCUMENTED IN MEDICAL RECORD: ICD-10-PCS | Mod: CPTII,S$GLB,,

## 2023-07-21 PROCEDURE — 1160F RVW MEDS BY RX/DR IN RCRD: CPT | Mod: CPTII,S$GLB,,

## 2023-07-21 PROCEDURE — 3079F PR MOST RECENT DIASTOLIC BLOOD PRESSURE 80-89 MM HG: ICD-10-PCS | Mod: CPTII,S$GLB,,

## 2023-07-21 PROCEDURE — 3008F PR BODY MASS INDEX (BMI) DOCUMENTED: ICD-10-PCS | Mod: CPTII,S$GLB,,

## 2023-07-21 PROCEDURE — 3008F BODY MASS INDEX DOCD: CPT | Mod: CPTII,S$GLB,,

## 2023-07-21 PROCEDURE — 99214 PR OFFICE/OUTPT VISIT, EST, LEVL IV, 30-39 MIN: ICD-10-PCS | Mod: S$GLB,,,

## 2023-07-21 RX ORDER — LEVOTHYROXINE SODIUM 25 UG/1
25 TABLET ORAL
Qty: 30 TABLET | Refills: 11 | Status: SHIPPED | OUTPATIENT
Start: 2023-07-21 | End: 2023-09-01 | Stop reason: SDUPTHER

## 2023-07-21 NOTE — PROGRESS NOTES
Subjective:       Patient ID: Kaia Nelson is a 62 y.o. female.    Chief Complaint: Results    Kaia Nelson is a 62 y.o. female patient that presents to clinic to go over lab results. Her TSH was elevated at 5.47.  She has no history of thyroid disease.  Also had elevated cholesterol level.  Recent lipids: TC:  201, Tri HDL:  47, LDL:  132.  Has been eating a low fat diet for awhile.  Rarely eats red meat.  She does eat some fats but states they are healthy fats.         Review of patient's allergies indicates:   Allergen Reactions    Benzonatate Other (See Comments)     dizziness     Social Determinants of Health     Tobacco Use: Low Risk     Smoking Tobacco Use: Never    Smokeless Tobacco Use: Never    Passive Exposure: Not on file   Alcohol Use: Unknown    Frequency of Alcohol Consumption: Patient refused    Average Number of Drinks: Patient refused    Frequency of Binge Drinking: Patient refused   Financial Resource Strain: Unknown    Difficulty of Paying Living Expenses: Patient refused   Food Insecurity: No Food Insecurity    Worried About Running Out of Food in the Last Year: Never true    Ran Out of Food in the Last Year: Never true   Transportation Needs: No Transportation Needs    Lack of Transportation (Medical): No    Lack of Transportation (Non-Medical): No   Physical Activity: Unknown    Days of Exercise per Week: Patient refused    Minutes of Exercise per Session: Patient refused   Stress: Unknown    Feeling of Stress : Patient refused   Social Connections: Unknown    Frequency of Communication with Friends and Family: Patient refused    Frequency of Social Gatherings with Friends and Family: Patient refused    Attends Adventist Services: More than 4 times per year    Active Member of Clubs or Organizations: Patient refused    Attends Club or Organization Meetings: Patient refused    Marital Status:    Housing Stability: Unknown    Unable to Pay for Housing in the Last Year: No    Number  of Places Lived in the Last Year: Not on file    Unstable Housing in the Last Year: No   Depression: Low Risk     Last PHQ-4: Flowsheet Data: 0      Past Medical History:   Diagnosis Date    Acute allergic rhinitis due to pollen     Allergy to dust     Chronic cough     Chronic rhinitis     Other pulmonary insufficiency, not elsewhere classified       Past Surgical History:   Procedure Laterality Date    ROBOT-ASSISTED CHOLECYSTECTOMY N/A 6/7/2023    Procedure: ROBOTIC CHOLECYSTECTOMY;  Surgeon: Larry Barba MD;  Location: [facility];  Service: General;  Laterality: N/A;      Social History     Socioeconomic History    Marital status:          Current Outpatient Medications:     azelastine (ASTELIN) 137 mcg (0.1 %) nasal spray, INSTILL ONE SPRAY INTO EACH NOSTRIL TWICE DAILY (Patient taking differently: 1 spray by Nasal route 2 (two) times daily. INSTILL ONE SPRAY INTO EACH NOSTRIL TWICE DAILY), Disp: 90 mL, Rfl: 4    doxycycline (VIBRA-TABS) 100 MG tablet, Take 100 mg by mouth 2 (two) times daily., Disp: , Rfl:     dutasteride (AVODART) 0.5 mg capsule, Take 0.5 mg by mouth once daily., Disp: , Rfl:     fluticasone propionate (FLONASE) 50 mcg/actuation nasal spray, 2 sprays (100 mcg total) by Each Nostril route 2 (two) times daily., Disp: 48 g, Rfl: 4    fluticasone propionate (FLOVENT HFA) 220 mcg/actuation inhaler, 2 puffs twice per day (Patient taking differently: Inhale 2 puffs into the lungs 2 (two) times a day. 2 puffs twice per day), Disp: 36 g, Rfl: 3    minoxidiL (LONITEN) 2.5 MG tablet, Take 1.25 mg by mouth every evening., Disp: , Rfl:     montelukast (SINGULAIR) 10 mg tablet, Take 1 tablet (10 mg total) by mouth once daily., Disp: 90 tablet, Rfl: 4    prednisoLONE acetate (PRED FORTE) 1 % DrpS, Place into both eyes., Disp: , Rfl:     spironolactone (ALDACTONE) 100 MG tablet, Take 100 mg by mouth once daily., Disp: , Rfl:     vitamin D (VITAMIN D3) 1000 units Tab, Take 2,000 Units by mouth once  daily., Disp: , Rfl:     levothyroxine (SYNTHROID) 25 MCG tablet, Take 1 tablet (25 mcg total) by mouth before breakfast., Disp: 30 tablet, Rfl: 11    Lab Results   Component Value Date    WBC 7.4 07/17/2023    HGB 12.8 07/17/2023    HCT 40.4 07/17/2023     07/17/2023    CHOL 201 (H) 07/17/2023    TRIG 117 07/17/2023    HDL 47 (L) 07/17/2023    ALT 9 07/17/2023    AST 13 07/17/2023     07/17/2023    K 4.5 07/17/2023     07/17/2023    CREATININE 0.71 07/17/2023    BUN 12 07/17/2023    CO2 27 07/17/2023    TSH 5.47 (H) 07/17/2023    INR 1.0 04/30/2023    HGBA1C 5.6 07/17/2023       Review of Systems   Constitutional:  Negative for chills and fever.   Respiratory:  Negative for chest tightness and shortness of breath.    Cardiovascular:  Negative for chest pain and palpitations.     Objective:      Physical Exam  Vitals reviewed.   Constitutional:       Appearance: Normal appearance.   Cardiovascular:      Rate and Rhythm: Normal rate and regular rhythm.      Pulses: Normal pulses.      Heart sounds: Normal heart sounds, S1 normal and S2 normal.   Pulmonary:      Effort: Pulmonary effort is normal.      Breath sounds: Normal breath sounds.   Neurological:      Mental Status: She is alert.       Assessment:       1. Hypothyroidism, unspecified type    2. Screen for colon cancer        Plan:       Kaia was seen today for results.    Diagnoses and all orders for this visit:    Hypothyroidism, unspecified type  -     levothyroxine (SYNTHROID) 25 MCG tablet; Take 1 tablet (25 mcg total) by mouth before breakfast.  -     TSH; Future  -     TSH    Screen for colon cancer  -     Cologuard Screening (Multitarget Stool DNA); Future  -     Cologuard Screening (Multitarget Stool DNA)           Start levothyroxine 25 mcg daily. Repeat TSH in 1 month.   Continue eating low fat diet. Increase exercise. Will continue to monitor cholesterol  Follow-up in January as scheduled for regular checkup with Dr. Ramos

## 2023-07-27 ENCOUNTER — HOSPITAL ENCOUNTER (OUTPATIENT)
Dept: RADIOLOGY | Facility: HOSPITAL | Age: 63
Discharge: HOME | End: 2023-07-27
Attending: INTERNAL MEDICINE
Payer: COMMERCIAL

## 2023-07-27 DIAGNOSIS — K86.89 PANCREATIC ATROPHY: ICD-10-CM

## 2023-07-27 DIAGNOSIS — Z87.19 HX OF ACUTE PANCREATITIS: ICD-10-CM

## 2023-07-27 PROCEDURE — 74178 CT ABD&PLV WO CNTR FLWD CNTR: CPT | Mod: TC

## 2023-07-27 PROCEDURE — 25500020 PHARM REV CODE 255

## 2023-07-27 PROCEDURE — 74178 CT ABDOMEN PELVIS W WO CONTRAST: ICD-10-PCS | Mod: 26,,, | Performed by: RADIOLOGY

## 2023-07-27 PROCEDURE — 74178 CT ABD&PLV WO CNTR FLWD CNTR: CPT | Mod: 26,,, | Performed by: RADIOLOGY

## 2023-07-27 RX ADMIN — IOHEXOL 75 ML: 350 INJECTION, SOLUTION INTRAVENOUS at 11:07

## 2023-07-28 ENCOUNTER — HOSPITAL ENCOUNTER (OUTPATIENT)
Dept: RADIOLOGY | Facility: HOSPITAL | Age: 63
Discharge: HOME | End: 2023-07-28
Payer: COMMERCIAL

## 2023-07-28 ENCOUNTER — TELEPHONE (OUTPATIENT)
Dept: FAMILY MEDICINE | Facility: CLINIC | Age: 63
End: 2023-07-28
Payer: COMMERCIAL

## 2023-07-28 DIAGNOSIS — Z12.31 ENCOUNTER FOR SCREENING MAMMOGRAM FOR MALIGNANT NEOPLASM OF BREAST: ICD-10-CM

## 2023-07-28 DIAGNOSIS — Z87.898 HISTORY OF ABNORMAL MAMMOGRAM: ICD-10-CM

## 2023-07-28 PROCEDURE — 77067 SCR MAMMO BI INCL CAD: CPT | Mod: TC,PO

## 2023-07-28 NOTE — TELEPHONE ENCOUNTER
----- Message from Patricia Tse NP sent at 7/28/2023  2:34 PM CDT -----  Mammogram shows no evidence of malignancy. Routine yearly screening recommended.

## 2023-08-09 LAB — NONINV COLON CA DNA+OCC BLD SCRN STL QL: NORMAL

## 2023-08-10 NOTE — PROGRESS NOTES
[facility] was unable to process your test. They should reach out to you to provide you with a new test kit.

## 2023-08-24 LAB — NONINV COLON CA DNA+OCC BLD SCRN STL QL: NEGATIVE

## 2023-08-28 ENCOUNTER — TELEPHONE (OUTPATIENT)
Dept: FAMILY MEDICINE | Facility: CLINIC | Age: 63
End: 2023-08-28
Payer: COMMERCIAL

## 2023-08-28 NOTE — TELEPHONE ENCOUNTER
----- Message from Patricia Tse NP sent at 8/24/2023  1:36 PM CDT -----  Colon cancer screening is negative. Repeat in 3 years.    [FreeTextEntry1] : Impression:\par 1.  Hypertension fairly well controlled\par 2.  Hyperlipidemia on statin therapy with uncertain LDL goal\par 3.  New right bundle branch block pattern\par \par Plan:\par 1.  For now continue current medical regimen\par 2.  Coronary calcium scoring to assess for underlying disease.  If indeed he has an elevated calcium score would increase statin therapy with an eye towards an LDL goal of 70

## 2023-09-01 DIAGNOSIS — E03.9 HYPOTHYROIDISM, UNSPECIFIED TYPE: ICD-10-CM

## 2023-09-01 LAB — TSH SERPL-ACNC: 4.67 MIU/L (ref 0.4–4.5)

## 2023-09-01 RX ORDER — LEVOTHYROXINE SODIUM 50 UG/1
50 TABLET ORAL
Qty: 30 TABLET | Refills: 5 | Status: SHIPPED | OUTPATIENT
Start: 2023-09-01 | End: 2024-01-12 | Stop reason: SDUPTHER

## 2023-09-01 NOTE — PROGRESS NOTES
Thyroid level is still to high. Need to increase levothyroxine to 50 mcg daily. Recheck TSH in 1 month. Medication refill sent to [facility].

## 2023-09-21 NOTE — PROGRESS NOTES
Patient provided with discharge instructions, received printed AVS.  All questions answered. Patient understands follow up plan of care. [facility] Medicine  Progress Note    Patient Name: Kaia Nelson  MRN: [id number]  Patient Class: IP- Inpatient   Admission Date: 4/30/2023  Length of Stay: 0 days  Attending Physician: Nicholas Ghotra MD  Primary Care Provider: Patricia Tse NP        Subjective:     Principal Problem:Acute pancreatitis        HPI:  61 yo presents with c/o epigastric pain that radiates to right shoulder. Reports this pain presented 3 weeks ago while in [facility] after a book tour while walking to the car. Reports she started having sharp pain in the epigastric region and  radiated to the right shoulder. Reports the pain was severe but they drove back to [place] not wanting to go to a hospital out of state. Reports by the time they made it home the pain was not gone but slightly dull and she was able to go to bed and rest. Stating the pain has never completely gone away about a week ago the pain got sharp again but not as bas as the initial onset and lasted a couple of hours before getting dull again. The  works out of town and had been gone for 2 weeks and after getting home yesterday she still was not feeling well and he insisted they come to the hospital to be checked out. Reports 2 vomiting episodes with each sharp pain episode. Reports the severe pain has no correlations to food. Denies nausea and vomiting (other than the 2 episodes. No chest pain or shortness of breath and no positive cardiac history.        Overview/Hospital Course:  62F with PMH allergies and androgenic alopecia is admitted with abdominal pain and nausea found to have acute pancreatitis. Vitals stable. Noted elevated lipase, AST, and ALT. CT abd/pelv with fatty atrophy of the pancreas with regional inflammation also with abnormal dilated flaccid gallbladder. US abd showed multiple polypoid foci along the peripheral edge of the gallbladder suggestive of hyperplastic cholesterolosis. Normal triglyceride level. Started on IVF, pain meds,  and IV zosyn. GI and general surgery consulted. Surgery was not deemed necessary inpatient so she was recommended to follow up with general surgery outpatient after this episode of pancreatitis is resolved to consider cholecystectomy. Zosyn discontinued as no obvious infection identified. Diet advanced as tolerated.      Interval History: Patient seen and examined. NAEON. Pain similar maybe slightly better. Still with poor PO intake.      Objective:     Vital Signs (Most Recent):  Temp: 98.2 °F (36.8 °C) (05/02/23 1142)  Pulse: 68 (05/02/23 1142)  Resp: 19 (05/02/23 1142)  BP: (!) 141/82 (05/02/23 1142)  SpO2: 98 % (05/02/23 1142)   Vital Signs (24h Range):  Temp:  [97.9 °F (36.6 °C)-98.5 °F (36.9 °C)] 98.2 °F (36.8 °C)  Pulse:  [68-82] 68  Resp:  [14-19] 19  SpO2:  [94 %-98 %] 98 %  BP: (111-141)/(57-82) 141/82     Weight: 80.7 kg (178 lb)  Body mass index is 28.73 kg/m².    Intake/Output Summary (Last 24 hours) at 5/2/2023 1214  Last data filed at 5/2/2023 0333  Gross per 24 hour   Intake 240 ml   Output --   Net 240 ml      Physical Exam  Vitals reviewed.   Constitutional:       General: She is not in acute distress.  HENT:      Head: Normocephalic and atraumatic.   Cardiovascular:      Rate and Rhythm: Normal rate and regular rhythm.   Pulmonary:      Effort: Pulmonary effort is normal. No respiratory distress.   Abdominal:      General: Abdomen is flat. Bowel sounds are normal. There is no distension.      Palpations: Abdomen is soft.      Tenderness: There is abdominal tenderness (moderate) in the epigastric area. There is no guarding or rebound.   Neurological:      General: No focal deficit present.      Mental Status: She is alert and oriented to person, place, and time.   Psychiatric:         Mood and Affect: Affect normal.         Behavior: Behavior normal.         Thought Content: Thought content normal.       Significant Labs: All pertinent labs within the past 24 hours have been  reviewed.    Significant Imaging: I have reviewed all pertinent imaging results/findings within the past 24 hours.      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  CT abd/pelv and US abd reviewed  - Adv diet as tolerated; did not tolerate much CLD  - continue IVF; decrease rate  - pain meds  - dc zosyn; no obvious infx  - consults to GI and gen surg; f/u recs    Androgenic alopecia  Chronic  - continue home spironolactone, dutasteride, and minoxidil    Non-seasonal allergic rhinitis due to pollen  Chronic, stable  - continue home nasal sprays, budesonide neb, and montelukast    VTE Risk Mitigation (From admission, onward)         Ordered     IP VTE LOW RISK PATIENT  Once         04/30/23 1900                Discharge Planning   LAKESHA: 5/4    Code Status: Full Code   Is the patient medically ready for discharge?:     Reason for patient still in hospital (select all that apply): Patient trending condition  Discharge Plan A: Home with family                  Nicholas Ghotra MD  Department of Hospital Medicine   [facility]

## 2023-09-27 ENCOUNTER — OFFICE VISIT (OUTPATIENT)
Dept: ALLERGY | Facility: CLINIC | Age: 63
End: 2023-09-27
Payer: COMMERCIAL

## 2023-09-27 VITALS — HEART RATE: 73 BPM | DIASTOLIC BLOOD PRESSURE: 72 MMHG | TEMPERATURE: 98 F | SYSTOLIC BLOOD PRESSURE: 114 MMHG

## 2023-09-27 DIAGNOSIS — J98.4 SMALL AIRWAYS DISEASE: ICD-10-CM

## 2023-09-27 DIAGNOSIS — D80.1 HYPOGAMMAGLOBULINEMIA: Primary | ICD-10-CM

## 2023-09-27 DIAGNOSIS — J30.1 NON-SEASONAL ALLERGIC RHINITIS DUE TO POLLEN: ICD-10-CM

## 2023-09-27 PROCEDURE — 3078F PR MOST RECENT DIASTOLIC BLOOD PRESSURE < 80 MM HG: ICD-10-PCS | Mod: CPTII,S$GLB,, | Performed by: ALLERGY & IMMUNOLOGY

## 2023-09-27 PROCEDURE — 3044F PR MOST RECENT HEMOGLOBIN A1C LEVEL <7.0%: ICD-10-PCS | Mod: CPTII,S$GLB,, | Performed by: ALLERGY & IMMUNOLOGY

## 2023-09-27 PROCEDURE — 99213 OFFICE O/P EST LOW 20 MIN: CPT | Mod: S$GLB,,, | Performed by: ALLERGY & IMMUNOLOGY

## 2023-09-27 PROCEDURE — 99213 PR OFFICE/OUTPT VISIT, EST, LEVL III, 20-29 MIN: ICD-10-PCS | Mod: S$GLB,,, | Performed by: ALLERGY & IMMUNOLOGY

## 2023-09-27 PROCEDURE — 1160F PR REVIEW ALL MEDS BY PRESCRIBER/CLIN PHARMACIST DOCUMENTED: ICD-10-PCS | Mod: CPTII,S$GLB,, | Performed by: ALLERGY & IMMUNOLOGY

## 2023-09-27 PROCEDURE — 3074F SYST BP LT 130 MM HG: CPT | Mod: CPTII,S$GLB,, | Performed by: ALLERGY & IMMUNOLOGY

## 2023-09-27 PROCEDURE — 3074F PR MOST RECENT SYSTOLIC BLOOD PRESSURE < 130 MM HG: ICD-10-PCS | Mod: CPTII,S$GLB,, | Performed by: ALLERGY & IMMUNOLOGY

## 2023-09-27 PROCEDURE — 1159F MED LIST DOCD IN RCRD: CPT | Mod: CPTII,S$GLB,, | Performed by: ALLERGY & IMMUNOLOGY

## 2023-09-27 PROCEDURE — 3044F HG A1C LEVEL LT 7.0%: CPT | Mod: CPTII,S$GLB,, | Performed by: ALLERGY & IMMUNOLOGY

## 2023-09-27 PROCEDURE — 1159F PR MEDICATION LIST DOCUMENTED IN MEDICAL RECORD: ICD-10-PCS | Mod: CPTII,S$GLB,, | Performed by: ALLERGY & IMMUNOLOGY

## 2023-09-27 PROCEDURE — 3078F DIAST BP <80 MM HG: CPT | Mod: CPTII,S$GLB,, | Performed by: ALLERGY & IMMUNOLOGY

## 2023-09-27 PROCEDURE — 1160F RVW MEDS BY RX/DR IN RCRD: CPT | Mod: CPTII,S$GLB,, | Performed by: ALLERGY & IMMUNOLOGY

## 2023-09-27 RX ORDER — MONTELUKAST SODIUM 10 MG/1
10 TABLET ORAL DAILY
Qty: 90 TABLET | Refills: 4 | Status: SHIPPED | OUTPATIENT
Start: 2023-09-27

## 2023-09-27 RX ORDER — TIOTROPIUM BROMIDE INHALATION SPRAY 1.56 UG/1
2 SPRAY, METERED RESPIRATORY (INHALATION) DAILY
Qty: 4 G | Refills: 3 | Status: SHIPPED | OUTPATIENT
Start: 2023-09-27 | End: 2024-01-15

## 2023-09-27 RX ORDER — PREDNISONE 10 MG/1
10 TABLET ORAL DAILY
Qty: 5 TABLET | Refills: 0 | Status: SHIPPED | OUTPATIENT
Start: 2023-09-27 | End: 2024-01-15

## 2023-09-27 RX ORDER — AZELASTINE 1 MG/ML
SPRAY, METERED NASAL
Qty: 90 ML | Refills: 4 | Status: SHIPPED | OUTPATIENT
Start: 2023-09-27

## 2023-09-27 NOTE — LETTER
September 27, 2023        Juliano Ramos III, MD  [address]  [address]  [place] [address]             [facility]  [address]  [address]  [place] [address]  Phone: [phone]  Fax: [phone]   Patient: Kaia Nelson   MR Number: [id number]   YOB: 1960   Date of Visit: 9/27/2023       Dear Dr. Ramos:    Thank you for referring Kaia Nelson to me for evaluation. Below are the relevant portions of my assessment and plan of care.            If you have questions, please do not hesitate to call me. I look forward to following Kaia along with you.    Sincerely,      Diana Corado MD           CC  No Recipients

## 2023-09-27 NOTE — PROGRESS NOTES
Subjective:       Patient ID: Kaia Nelson is a 62 y.o. female.    Chief Complaint: Small airways disease (Patient is doing good. Only doing azelastine and montelukast and flovent. )      Pt has small airway disease now with cough that is improved     Her last visit 9/2022,  - proair, flovent 220 mcg, flonase, azelastine, montelkuast 10 mg po qday.       She has been well taking her medications.     Since her last visit,     She has been hospitalized for pancreatitis.   She also had her gallbladder removed.     She is improved however from a pulmonary and rhinitis standpoint    She is able to use flovent 110 mcg 2 puffs qday.     She does bring a sheet that shows igg hypogam.   550  This was during the time of her pancreatitis.     Discussed repeating in October when her thyroid lab work will also be rechecked  She is on synthroid currently.     Pt states cough is better with spiriva   Tx: at her last visit, started on flovent, spiriva   Using 2 puffs of spiriva in the morning, montelukast 10 mg po qday- no change in mental status.   tx: asmanex 2 bid was changed to flovent 220 2 puffs qhs . symbicort sample 80 2 puffs bid given. Not much difference with the symbicort 2 puffs BID 80 mcg.   Rescue inhaler: ventolin   briana frequ: not had to use.   Azelastine 1 spray per nare qam magic mouth wash - didn't help.   Saline: once per day at night.     Concern that going outside and start with her cough as her last exacerbation occurred while riding a bike.     Prior tx:   augmentin- 6/2019   Steroids 6/2019  azithromycin 10 day course and chest xray showed streakiness and concern for atypical mycoplasma and was tx x 10 days.         General: neg unexpected weight changes, fevers, chills, night sweats, malaise  HEENT: see hpi, Neg eye pain, vision changes, ear drainage, nose bleeds, throat tightness, sores in the mouth  CV: Neg chest pain, palpitations, swelling  Resp: see hpi, neg hemoptysis  GI: see hpi, neg dysphagia,  night abdominal pain, reflux, chronic diarrhea, chronic constipation  Derm: See Hpi, neg new rash, neg flushing  Mu/sk: Neg joint pain, joint swelling   Psych: Neg anxiety  neuro: neg chronic headaches, muscle weakness  Endo: neg heat/cold intolerance, chronic fatigue     Objective:       Vitals:    09/27/23 1002   BP: 114/72   Pulse: 73   Temp: 98 °F (36.7 °C)     No peak flow due to pandemic     Physical Exam      General: no acute distress, well developed well nourished   Chest: full respiratory excursion no abnormal chest abnormality  Resp: clear to ascultation bilaterally      Assessment:       1. Hypogammaglobulinemia    2. Small airways disease            Plan:       Hypogammaglobulinemia  -     Immunoglobulins (IgG, IgA, IgM) Quantitative; Future  -     Streptococcus pneumoniae Antibody (IgG) (23 Serotypes); Future; Expected date: 09/27/2023    Small airways disease  -     tiotropium bromide (SPIRIVA RESPIMAT) 1.25 mcg/actuation inhaler; Inhale 2 puffs into the lungs once daily. Controller  Dispense: 4 g; Refill: 3  -     predniSONE (DELTASONE) 10 MG tablet; Take 1 tablet (10 mg total) by mouth once daily.  Dispense: 5 tablet; Refill: 0              Small airway disease   - improved on current regimen  - continue flovent 110 mcg 2 puffs qhs.   - Continue spiriva 2 puffs daily prn   No exacerbation in 12 months.     s/p 10 day course of azithro. - 12/2019  Not improved on steroids. 12/2019  flovent 2 puffs bid- in illness: if peak flow down, increase to 4 puffs bid x 2 weeks then back down to baseline dosing.   symbicort 80 2 puffs bid did not help.   magic mouthwash prn q 3-6 hours. Not helpful.   Chest radiograph showed streakiness and tx for atypical. 6/2019    Allergic rhinitis:  Continue flonase and azelastine prn   Discussed allergy shots and risk and benefit- declines at this time.   Continue fluticasone, consider changing flonase to xhance , vs xlear, vs budesonide in saline rinse  Pt will let me  know  For out side increase frequency of meds.     6/2019- Face was significantly swollen on the left- augmentin 875 mg BID, due to chills and fever feeling and how pale she is today will treat for flu like symptoms - 6/2019.     [facility]   9/22:  Nystatin mouth qid   Use spacer.     Follow up in 12 months, sooner if needed.                 Diana Corado M.D.  Allergy/Immunology  [facility]   [phone] phone  [phone] fax

## 2023-09-27 NOTE — PATIENT INSTRUCTIONS
If you start with cough , especially wet, start spiriva 2 puffs daily     If you have uncontrollable cough, take 1 prednisone daily     INcrease flovent to 2 puffs twice per day x 2 weeks if exacerbated, if all good  Stay at 2 puffs daily       IgG repeat October non fasting   Tsh is non-fasting as well :o)        Dec 1st , I will no longer be with [facility].

## 2023-10-11 ENCOUNTER — PATIENT MESSAGE (OUTPATIENT)
Dept: FAMILY MEDICINE | Facility: CLINIC | Age: 63
End: 2023-10-11

## 2023-10-12 ENCOUNTER — PATIENT MESSAGE (OUTPATIENT)
Dept: FAMILY MEDICINE | Facility: CLINIC | Age: 63
End: 2023-10-12
Payer: COMMERCIAL

## 2023-10-13 DIAGNOSIS — E03.9 HYPOTHYROIDISM, UNSPECIFIED TYPE: Primary | ICD-10-CM

## 2023-10-17 LAB
ALBUMIN SERPL-MCNC: 4.5 G/DL (ref 3.6–5.1)
ALBUMIN/GLOB SERPL: 1.8 (CALC) (ref 1–2.5)
ALP SERPL-CCNC: 58 U/L (ref 37–153)
ALT SERPL-CCNC: 11 U/L (ref 6–29)
AST SERPL-CCNC: 14 U/L (ref 10–35)
BASOPHILS # BLD AUTO: 60 CELLS/UL (ref 0–200)
BASOPHILS NFR BLD AUTO: 0.7 %
BILIRUB SERPL-MCNC: 0.4 MG/DL (ref 0.2–1.2)
BUN SERPL-MCNC: 14 MG/DL (ref 7–25)
BUN/CREAT SERPL: NORMAL (CALC) (ref 6–22)
CALCIUM SERPL-MCNC: 10 MG/DL (ref 8.6–10.4)
CHLORIDE SERPL-SCNC: 103 MMOL/L (ref 98–110)
CHOLEST SERPL-MCNC: 218 MG/DL
CHOLEST/HDLC SERPL: 4.3 (CALC)
CO2 SERPL-SCNC: 27 MMOL/L (ref 20–32)
CREAT SERPL-MCNC: 0.77 MG/DL (ref 0.5–1.05)
EGFR: 88 ML/MIN/1.73M2
EOSINOPHIL # BLD AUTO: 289 CELLS/UL (ref 15–500)
EOSINOPHIL NFR BLD AUTO: 3.4 %
ERYTHROCYTE [DISTWIDTH] IN BLOOD BY AUTOMATED COUNT: 13.1 % (ref 11–15)
GLOBULIN SER CALC-MCNC: 2.5 G/DL (CALC) (ref 1.9–3.7)
GLUCOSE SERPL-MCNC: 96 MG/DL (ref 65–99)
HCT VFR BLD AUTO: 41.3 % (ref 35–45)
HDLC SERPL-MCNC: 51 MG/DL
HGB BLD-MCNC: 13.1 G/DL (ref 11.7–15.5)
LDLC SERPL CALC-MCNC: 146 MG/DL (CALC)
LYMPHOCYTES # BLD AUTO: 1930 CELLS/UL (ref 850–3900)
LYMPHOCYTES NFR BLD AUTO: 22.7 %
MCH RBC QN AUTO: 28 PG (ref 27–33)
MCHC RBC AUTO-ENTMCNC: 31.7 G/DL (ref 32–36)
MCV RBC AUTO: 88.2 FL (ref 80–100)
MONOCYTES # BLD AUTO: 425 CELLS/UL (ref 200–950)
MONOCYTES NFR BLD AUTO: 5 %
NEUTROPHILS # BLD AUTO: 5797 CELLS/UL (ref 1500–7800)
NEUTROPHILS NFR BLD AUTO: 68.2 %
NONHDLC SERPL-MCNC: 167 MG/DL (CALC)
PLATELET # BLD AUTO: 295 THOUSAND/UL (ref 140–400)
PMV BLD REES-ECKER: 10.5 FL (ref 7.5–12.5)
POTASSIUM SERPL-SCNC: 4.7 MMOL/L (ref 3.5–5.3)
PROT SERPL-MCNC: 7 G/DL (ref 6.1–8.1)
RBC # BLD AUTO: 4.68 MILLION/UL (ref 3.8–5.1)
SODIUM SERPL-SCNC: 139 MMOL/L (ref 135–146)
TRIGL SERPL-MCNC: 100 MG/DL
TSH SERPL-ACNC: 3.6 MIU/L (ref 0.4–4.5)
WBC # BLD AUTO: 8.5 THOUSAND/UL (ref 3.8–10.8)

## 2023-10-18 NOTE — PROGRESS NOTES
Total cholesterol is higher than previous. I recommend a heart healthy diet rich in fiber, fresh vegetables and fruit and low in saturated fats (fried foods, red meat, etc.).  I also recommend regular exercise.  Remaining labs including thyroid are normal. Follow up as recommended.

## 2023-10-19 ENCOUNTER — TELEPHONE (OUTPATIENT)
Dept: FAMILY MEDICINE | Facility: CLINIC | Age: 63
End: 2023-10-19
Payer: COMMERCIAL

## 2023-10-19 DIAGNOSIS — Z13.220 SCREENING FOR HYPERLIPIDEMIA: Primary | ICD-10-CM

## 2023-10-19 LAB
IGA SERPL-MCNC: 233 MG/DL (ref 70–320)
IGG SERPL-MCNC: 722 MG/DL (ref 600–1540)
IGM SERPL-MCNC: 68 MG/DL (ref 50–300)
S PN DA SERO 19F IGG SER-MCNC: 3.4 UG/ML
S PNEUM DA 1 IGG SER-MCNC: 56.7 UG/ML
S PNEUM DA 10A IGG SER-MCNC: 2.2 UG/ML
S PNEUM DA 11A IGG SER-MCNC: 15.2 UG/ML
S PNEUM DA 12F IGG SER-MCNC: 1.6 UG/ML
S PNEUM DA 14 IGG SER-MCNC: <0.3
S PNEUM DA 15B IGG SER-MCNC: 13.3 UG/ML
S PNEUM DA 17F IGG SER-MCNC: 0.9 UG/ML
S PNEUM DA 18C IGG SER-MCNC: 1.6
S PNEUM DA 19A IGG SER-MCNC: 22.5 UG/ML
S PNEUM DA 2 IGG SER-MCNC: <0.3 UG/ML
S PNEUM DA 20A IGG SER-MCNC: 0.7 UG/ML
S PNEUM DA 22F IGG SER-MCNC: 1.9 UG/ML
S PNEUM DA 23F IGG SER-MCNC: 18.3 UG/ML
S PNEUM DA 3 IGG SER-MCNC: 0.4 UG/ML
S PNEUM DA 33F IGG SER-MCNC: 0.9 UG/ML
S PNEUM DA 4 IGG SER-MCNC: 10.3 UG/ML
S PNEUM DA 5 IGG SER-MCNC: 2.3 UG/ML
S PNEUM DA 6B IGG SER-MCNC: 7.6 UG/ML
S PNEUM DA 7F IGG SER-MCNC: 10.1 UG/ML
S PNEUM DA 8 IGG SER-MCNC: 14.6 UG/ML
S PNEUM DA 9N IGG SER-MCNC: <0.3 UG/ML
S PNEUM DA 9V IGG SER-MCNC: 5.6 UG/ML

## 2023-10-19 NOTE — TELEPHONE ENCOUNTER
----- Message from Patricia Tse NP sent at 10/18/2023  2:36 PM CDT -----  Total cholesterol is higher than previous. I recommend a heart healthy diet rich in fiber, fresh vegetables and fruit and low in saturated fats (fried foods, red meat, etc.).  I also recommend regular exercise.  Remaining labs including thyroid are normal. Follow up as recommended.

## 2023-12-19 LAB — TSH SERPL-ACNC: 3.24 MIU/L (ref 0.4–4.5)

## 2023-12-29 DIAGNOSIS — E03.9 HYPOTHYROIDISM, UNSPECIFIED TYPE: ICD-10-CM

## 2024-01-02 RX ORDER — LEVOTHYROXINE SODIUM 50 UG/1
50 TABLET ORAL
Qty: 90 TABLET | Refills: 1 | OUTPATIENT
Start: 2024-01-02

## 2024-01-12 ENCOUNTER — OFFICE VISIT (OUTPATIENT)
Dept: FAMILY MEDICINE | Facility: CLINIC | Age: 64
End: 2024-01-12
Payer: COMMERCIAL

## 2024-01-12 VITALS
TEMPERATURE: 99 F | DIASTOLIC BLOOD PRESSURE: 70 MMHG | HEART RATE: 71 BPM | BODY MASS INDEX: 26.63 KG/M2 | HEIGHT: 65 IN | OXYGEN SATURATION: 100 % | SYSTOLIC BLOOD PRESSURE: 112 MMHG | WEIGHT: 159.81 LBS

## 2024-01-12 DIAGNOSIS — K85.90 ACUTE PANCREATITIS, UNSPECIFIED COMPLICATION STATUS, UNSPECIFIED PANCREATITIS TYPE: ICD-10-CM

## 2024-01-12 DIAGNOSIS — J45.20 MILD INTERMITTENT ASTHMA, UNSPECIFIED WHETHER COMPLICATED: ICD-10-CM

## 2024-01-12 DIAGNOSIS — D80.1 HYPOGAMMAGLOBULINEMIA: ICD-10-CM

## 2024-01-12 DIAGNOSIS — Z00.00 ENCOUNTER FOR PREVENTIVE CARE: ICD-10-CM

## 2024-01-12 DIAGNOSIS — E03.9 HYPOTHYROIDISM, UNSPECIFIED TYPE: Primary | ICD-10-CM

## 2024-01-12 PROCEDURE — 1160F RVW MEDS BY RX/DR IN RCRD: CPT | Mod: CPTII,S$GLB,, | Performed by: FAMILY MEDICINE

## 2024-01-12 PROCEDURE — 3078F DIAST BP <80 MM HG: CPT | Mod: CPTII,S$GLB,, | Performed by: FAMILY MEDICINE

## 2024-01-12 PROCEDURE — 99396 PREV VISIT EST AGE 40-64: CPT | Mod: S$GLB,,, | Performed by: FAMILY MEDICINE

## 2024-01-12 PROCEDURE — 1159F MED LIST DOCD IN RCRD: CPT | Mod: CPTII,S$GLB,, | Performed by: FAMILY MEDICINE

## 2024-01-12 PROCEDURE — 99999 PR PBB SHADOW E&M-EST. PATIENT-LVL IV: CPT | Mod: PBBFAC,,, | Performed by: FAMILY MEDICINE

## 2024-01-12 PROCEDURE — 3074F SYST BP LT 130 MM HG: CPT | Mod: CPTII,S$GLB,, | Performed by: FAMILY MEDICINE

## 2024-01-12 PROCEDURE — 3008F BODY MASS INDEX DOCD: CPT | Mod: CPTII,S$GLB,, | Performed by: FAMILY MEDICINE

## 2024-01-12 RX ORDER — LEVOTHYROXINE SODIUM 75 UG/1
75 TABLET ORAL
Qty: 90 TABLET | Refills: 1 | Status: SHIPPED | OUTPATIENT
Start: 2024-01-12

## 2024-01-15 PROBLEM — D80.1 HYPOGAMMAGLOBULINEMIA: Status: ACTIVE | Noted: 2024-01-15

## 2024-01-15 PROBLEM — J45.20 MILD INTERMITTENT ASTHMA: Status: ACTIVE | Noted: 2024-01-15

## 2024-01-15 PROBLEM — E03.9 HYPOTHYROIDISM: Status: ACTIVE | Noted: 2024-01-15

## 2024-01-15 NOTE — PROGRESS NOTES
Subjective:       Patient ID: Kaia Nelson is a 63 y.o. female.    Chief Complaint: Hypothyroidism    Here for new patient visit.  Saw Dr. Kiran previously.    Social history nonsmoker no alcohol intake of significance.  Retired.  Worked at [employer].    Family history mother asthma hypertension.  Father diabetes mellitus 1 sister with melanoma.  Paternal grandmother ovarian cancer.    Immunizations needs RSV vaccine.      Past medical history.  Prior cholecystectomy last year.  History of pancreatitis.  Hypothyroidism.  Diagnosed a year ago.  Hypogammaglobulinemia.  Asthma mild intermittent Flovent and rare albuterol use.  Alopecia on minoxidil Aldactone and Avodart.   0 para 0 A/B 0.       Review of Systems   Constitutional: Negative.    HENT: Negative.     Eyes: Negative.    Respiratory: Negative.     Cardiovascular: Negative.    Gastrointestinal: Negative.    Endocrine: Negative.    Genitourinary: Negative.    Musculoskeletal: Negative.    Skin: Negative.    Allergic/Immunologic: Negative.    Neurological: Negative.    Hematological: Negative.    Psychiatric/Behavioral: Negative.     All other systems reviewed and are negative.      Objective:      Physical Exam  Vitals and nursing note reviewed.   Constitutional:       Appearance: Normal appearance. She is well-developed and normal weight.   HENT:      Head: Normocephalic and atraumatic.      Right Ear: Tympanic membrane normal.      Left Ear: Tympanic membrane normal.      Nose: Nose normal.      Mouth/Throat:      Mouth: Mucous membranes are moist.   Eyes:      Conjunctiva/sclera: Conjunctivae normal.      Pupils: Pupils are equal, round, and reactive to light.   Neck:      Vascular: No carotid bruit.   Cardiovascular:      Rate and Rhythm: Normal rate and regular rhythm.      Pulses: Normal pulses.      Heart sounds: Normal heart sounds. No murmur heard.     No gallop.   Pulmonary:      Effort: Pulmonary effort is normal.      Breath sounds: Normal  breath sounds.   Abdominal:      General: Bowel sounds are normal.      Palpations: Abdomen is soft.      Tenderness: There is no abdominal tenderness.   Musculoskeletal:         General: Normal range of motion.      Cervical back: Normal range of motion.      Right lower leg: No edema.      Left lower leg: No edema.   Lymphadenopathy:      Cervical: No cervical adenopathy.   Skin:     General: Skin is warm and dry.      Comments: Some frontal hair thinning.   Neurological:      General: No focal deficit present.      Mental Status: She is alert and oriented to person, place, and time.   Psychiatric:         Behavior: Behavior normal.         Thought Content: Thought content normal.         Judgment: Judgment normal.         Assessment:       1. Hypothyroidism, unspecified type    2. Acute pancreatitis, unspecified complication status, unspecified pancreatitis type    3. Mild intermittent asthma, unspecified whether complicated    4. BMI 26.0-26.9,adult    5. Hypogammaglobulinemia    6. Encounter for preventive care        Plan:       Hypothyroidism, unspecified type  -     TSH; Future; Expected date: 04/01/2024  -     Lipid Panel; Future; Expected date: 04/01/2024    Acute pancreatitis, unspecified complication status, unspecified pancreatitis type    Mild intermittent asthma, unspecified whether complicated    BMI 26.0-26.9,adult    Hypogammaglobulinemia    Encounter for preventive care    Other orders  -     levothyroxine (SYNTHROID) 75 MCG tablet; Take 1 tablet (75 mcg total) by mouth before breakfast.  Dispense: 90 tablet; Refill: 1    RSV vaccine.  TSH is 3.25 so will increase her levothyroxine to 75 per day.  Follow-up in 3 months with a TSH and lipids.

## 2024-04-04 LAB
CHOLEST SERPL-MCNC: 188 MG/DL
CHOLEST/HDLC SERPL: 4.1 (CALC)
HDLC SERPL-MCNC: 46 MG/DL
LDLC SERPL CALC-MCNC: 117 MG/DL (CALC)
NONHDLC SERPL-MCNC: 142 MG/DL (CALC)
TRIGL SERPL-MCNC: 130 MG/DL
TSH SERPL-ACNC: 1.67 MIU/L (ref 0.4–4.5)

## 2024-04-12 ENCOUNTER — OFFICE VISIT (OUTPATIENT)
Dept: FAMILY MEDICINE | Facility: CLINIC | Age: 64
End: 2024-04-12
Payer: COMMERCIAL

## 2024-04-12 VITALS
DIASTOLIC BLOOD PRESSURE: 80 MMHG | HEIGHT: 65 IN | WEIGHT: 167.31 LBS | OXYGEN SATURATION: 98 % | SYSTOLIC BLOOD PRESSURE: 118 MMHG | HEART RATE: 67 BPM | RESPIRATION RATE: 18 BRPM | TEMPERATURE: 97 F | BODY MASS INDEX: 27.88 KG/M2

## 2024-04-12 DIAGNOSIS — E03.9 HYPOTHYROIDISM, UNSPECIFIED TYPE: Primary | ICD-10-CM

## 2024-04-12 DIAGNOSIS — L64.9 ANDROGENIC ALOPECIA: Chronic | ICD-10-CM

## 2024-04-12 DIAGNOSIS — D80.1 HYPOGAMMAGLOBULINEMIA: ICD-10-CM

## 2024-04-12 PROCEDURE — 99214 OFFICE O/P EST MOD 30 MIN: CPT | Mod: S$GLB,,, | Performed by: FAMILY MEDICINE

## 2024-04-12 PROCEDURE — G2211 COMPLEX E/M VISIT ADD ON: HCPCS | Mod: S$GLB,,, | Performed by: FAMILY MEDICINE

## 2024-04-12 PROCEDURE — 3074F SYST BP LT 130 MM HG: CPT | Mod: CPTII,S$GLB,, | Performed by: FAMILY MEDICINE

## 2024-04-12 PROCEDURE — 99999 PR PBB SHADOW E&M-EST. PATIENT-LVL IV: CPT | Mod: PBBFAC,,, | Performed by: FAMILY MEDICINE

## 2024-04-12 PROCEDURE — 3008F BODY MASS INDEX DOCD: CPT | Mod: CPTII,S$GLB,, | Performed by: FAMILY MEDICINE

## 2024-04-12 PROCEDURE — 1160F RVW MEDS BY RX/DR IN RCRD: CPT | Mod: CPTII,S$GLB,, | Performed by: FAMILY MEDICINE

## 2024-04-12 PROCEDURE — 3079F DIAST BP 80-89 MM HG: CPT | Mod: CPTII,S$GLB,, | Performed by: FAMILY MEDICINE

## 2024-04-12 PROCEDURE — 1159F MED LIST DOCD IN RCRD: CPT | Mod: CPTII,S$GLB,, | Performed by: FAMILY MEDICINE

## 2024-04-12 RX ORDER — LEVOTHYROXINE SODIUM 88 UG/1
88 TABLET ORAL
Qty: 90 TABLET | Refills: 1 | Status: SHIPPED | OUTPATIENT
Start: 2024-04-12

## 2024-04-14 NOTE — PROGRESS NOTES
Subjective:       Patient ID: Kaia Nelson is a 63 y.o. female.    Chief Complaint: Follow-up (3 month)    Follow-up hypothyroidism TSH 1.67.  Cardiovascular.   no  chest pain palpitations PND or orthopnea.  BMI of 27 up slightly.  Hypogammaglobulinemia.  IgG check is current.  Due again in September.  Cholesterol 188 down from 218 HDL 46  down from 146.  BMI of 27.  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    Physical examination.  Vital signs noted.  No acute distress.  Neck without bruit no adenopathy.  Chest clear.  Heart regular rate and rhythm.  Abdomen bowel sounds positive soft nontender no guarding or rebound.  Extremities without edema positive pedal pulses.      Objective:        Assessment:       1. Hypothyroidism, unspecified type    2. Hypogammaglobulinemia    3. BMI 27.0-27.9,adult    4. Androgenic alopecia        Plan:       Hypothyroidism, unspecified type  -     TSH; Future; Expected date: 10/12/2024    Hypogammaglobulinemia    BMI 27.0-27.9,adult    Androgenic alopecia    Other orders  -     levothyroxine (SYNTHROID) 88 MCG tablet; Take 1 tablet (88 mcg total) by mouth before breakfast.  Dispense: 90 tablet; Refill: 1    Increase her exercise.  Increase levothyroxine to 88 mcg daily.  Ninety with a refill.  Follow-up in 6 months with a TSH.  Discussed RSV vaccine.  In September get IgG IgA IgM TSH CBC and CMP.

## 2024-04-16 ENCOUNTER — PATIENT MESSAGE (OUTPATIENT)
Dept: FAMILY MEDICINE | Facility: CLINIC | Age: 64
End: 2024-04-16
Payer: COMMERCIAL

## 2024-07-12 ENCOUNTER — PATIENT MESSAGE (OUTPATIENT)
Dept: FAMILY MEDICINE | Facility: CLINIC | Age: 64
End: 2024-07-12
Payer: COMMERCIAL

## 2024-07-22 RX ORDER — LEVOTHYROXINE SODIUM 88 UG/1
88 TABLET ORAL
Qty: 90 TABLET | Refills: 2 | Status: SHIPPED | OUTPATIENT
Start: 2024-07-22

## 2024-07-22 NOTE — TELEPHONE ENCOUNTER
No care due was identified.  [facility] Embedded Care Due Messages. Reference number: [id number].   7/22/2024 10:14:49 AM CDT

## 2024-07-23 NOTE — TELEPHONE ENCOUNTER
Refill Decision Note   Kaia Nelson  is requesting a refill authorization.  Brief Assessment and Rationale for Refill:  Approve     Medication Therapy Plan:         Comments:     Note composed:7:26 PM 07/22/2024

## 2024-07-30 ENCOUNTER — HOSPITAL ENCOUNTER (OUTPATIENT)
Dept: RADIOLOGY | Facility: HOSPITAL | Age: 64
Discharge: HOME | End: 2024-07-30
Attending: FAMILY MEDICINE
Payer: COMMERCIAL

## 2024-07-30 DIAGNOSIS — Z12.31 ENCOUNTER FOR SCREENING MAMMOGRAM FOR BREAST CANCER: ICD-10-CM

## 2024-07-30 PROCEDURE — 77063 BREAST TOMOSYNTHESIS BI: CPT | Mod: 26,,, | Performed by: RADIOLOGY

## 2024-07-30 PROCEDURE — 77067 SCR MAMMO BI INCL CAD: CPT | Mod: TC

## 2024-07-30 PROCEDURE — 77063 BREAST TOMOSYNTHESIS BI: CPT | Mod: TC

## 2024-07-30 PROCEDURE — 77067 SCR MAMMO BI INCL CAD: CPT | Mod: 26,,, | Performed by: RADIOLOGY

## 2024-09-03 ENCOUNTER — PATIENT MESSAGE (OUTPATIENT)
Dept: FAMILY MEDICINE | Facility: CLINIC | Age: 64
End: 2024-09-03
Payer: COMMERCIAL

## 2024-09-03 DIAGNOSIS — E03.9 HYPOTHYROIDISM, UNSPECIFIED TYPE: ICD-10-CM

## 2024-09-03 DIAGNOSIS — D80.1 HYPOGAMMAGLOBULINEMIA: Primary | ICD-10-CM

## 2024-10-14 ENCOUNTER — OFFICE VISIT (OUTPATIENT)
Dept: FAMILY MEDICINE | Facility: CLINIC | Age: 64
End: 2024-10-14
Payer: COMMERCIAL

## 2024-10-14 VITALS
DIASTOLIC BLOOD PRESSURE: 70 MMHG | BODY MASS INDEX: 28.79 KG/M2 | OXYGEN SATURATION: 95 % | SYSTOLIC BLOOD PRESSURE: 118 MMHG | HEIGHT: 65 IN | TEMPERATURE: 98 F | WEIGHT: 172.81 LBS | HEART RATE: 73 BPM

## 2024-10-14 DIAGNOSIS — Z90.49 S/P CHOLECYSTECTOMY: ICD-10-CM

## 2024-10-14 DIAGNOSIS — E03.9 HYPOTHYROIDISM, UNSPECIFIED TYPE: ICD-10-CM

## 2024-10-14 DIAGNOSIS — J45.20 MILD INTERMITTENT ASTHMA, UNSPECIFIED WHETHER COMPLICATED: Primary | ICD-10-CM

## 2024-10-14 DIAGNOSIS — D80.1 HYPOGAMMAGLOBULINEMIA: ICD-10-CM

## 2024-10-14 DIAGNOSIS — L25.9 CONTACT DERMATITIS, UNSPECIFIED CONTACT DERMATITIS TYPE, UNSPECIFIED TRIGGER: ICD-10-CM

## 2024-10-14 DIAGNOSIS — R14.2 BELCHING: ICD-10-CM

## 2024-10-14 DIAGNOSIS — L64.9 ANDROGENIC ALOPECIA: Chronic | ICD-10-CM

## 2024-10-14 PROCEDURE — 3008F BODY MASS INDEX DOCD: CPT | Mod: CPTII,S$GLB,, | Performed by: FAMILY MEDICINE

## 2024-10-14 PROCEDURE — 99999 PR PBB SHADOW E&M-EST. PATIENT-LVL IV: CPT | Mod: PBBFAC,,, | Performed by: FAMILY MEDICINE

## 2024-10-14 PROCEDURE — 3078F DIAST BP <80 MM HG: CPT | Mod: CPTII,S$GLB,, | Performed by: FAMILY MEDICINE

## 2024-10-14 PROCEDURE — 99214 OFFICE O/P EST MOD 30 MIN: CPT | Mod: S$GLB,,, | Performed by: FAMILY MEDICINE

## 2024-10-14 PROCEDURE — 3074F SYST BP LT 130 MM HG: CPT | Mod: CPTII,S$GLB,, | Performed by: FAMILY MEDICINE

## 2024-10-14 PROCEDURE — 1159F MED LIST DOCD IN RCRD: CPT | Mod: CPTII,S$GLB,, | Performed by: FAMILY MEDICINE

## 2024-10-14 PROCEDURE — 1160F RVW MEDS BY RX/DR IN RCRD: CPT | Mod: CPTII,S$GLB,, | Performed by: FAMILY MEDICINE

## 2024-10-14 RX ORDER — PANTOPRAZOLE SODIUM 40 MG/1
40 TABLET, DELAYED RELEASE ORAL DAILY
Qty: 30 TABLET | Refills: 0 | Status: SHIPPED | OUTPATIENT
Start: 2024-10-14

## 2024-10-14 RX ORDER — PANTOPRAZOLE SODIUM 40 MG/1
40 TABLET, DELAYED RELEASE ORAL DAILY
COMMUNITY
End: 2024-10-14 | Stop reason: SDUPTHER

## 2024-10-14 NOTE — PROGRESS NOTES
SCRIBE #1 NOTE: I, Derek Hutton, am scribing for, and in the presence of,  Juliano Ramos III, MD. I have scribed the entire note.     Subjective:       Patient ID: Kaia Nelson is a 64 y.o. female.    Chief Complaint: Follow-up    Ms. Nelson is here for a 6 month follow up after her last appointment on April 12th. Contact dermatitis. States last week her neck was red, and then two days after this it was itchy. She then put cortisone cream on it. States after she eats she will keep burping, but she has no heartburn and no dysphagia. She has no trouble with hoarseness or coughing. States she has a fat-free diet. S/P cholecystectomy. BMI of 28.8. Cardiovascular good. No chest pain. No palpitations. Blood pressure is 118/70. Hypothyroidism. TSH is 2.68. Hypogammaglobulinemia. IgA is 268. IgG serum is 721. IgM is 77. Androgenic alopecia. On Aldactone, Avodart, and Minoxidil. Mild intermittent asthma. Uses Flovent 1x a day. She has not used Albuterol and may need it refilled. CBC is okay. CMP is okay. Mammogram is up to date as of July.     Review of Systems   Constitutional:  Negative for chills and fever.   HENT:  Negative for congestion, sore throat and trouble swallowing.    Eyes:  Negative for visual disturbance.   Respiratory:  Negative for cough, chest tightness and shortness of breath.    Cardiovascular:  Negative for chest pain.   Gastrointestinal:  Negative for nausea.   Endocrine: Negative for polydipsia and polyuria.   Genitourinary:  Negative for dysuria and flank pain.   Musculoskeletal:  Negative for back pain, neck pain and neck stiffness.   Skin:  Negative for rash.   Neurological:  Negative for weakness.   Hematological:  Does not bruise/bleed easily.   Psychiatric/Behavioral:  Negative for behavioral problems.    All other systems reviewed and are negative.      Objective:      Physical examination: Vital signs noted. No acute distress. No carotid bruit. Regular heart rate and rhythm. Lungs clear to  auscultation bilaterally. Abdomen bowel sounds positive soft and nontender. Extremities without edema. 2+ pedal pulses.      Assessment:       1. Mild intermittent asthma, unspecified whether complicated    2. Hypothyroidism, unspecified type    3. Androgenic alopecia    4. BMI 28.0-28.9,adult    5. Hypogammaglobulinemia    6. Contact dermatitis, unspecified contact dermatitis type, unspecified trigger    7. Belching    8. S/P cholecystectomy        Plan:       Mild intermittent asthma, unspecified whether complicated    Hypothyroidism, unspecified type    Androgenic alopecia    BMI 28.0-28.9,adult    Hypogammaglobulinemia    Contact dermatitis, unspecified contact dermatitis type, unspecified trigger    Belching    S/P cholecystectomy    Other orders  -     pantoprazole (PROTONIX) 40 MG tablet; Take 1 tablet (40 mg total) by mouth once daily.  Dispense: 30 tablet; Refill: 0    Continue same levothyroxine dose.  Protonix 40 mg daily 30 pills.  See if this resolves the GI issue.  Continue her regular medications.  Follow-up in 6 months.  All care gaps addressed or discussed.     I, Juliano Ramos III, MD, personally performed the services described in this documentation. All medical record entries made by the scribe were at my direction and in my presence. I have reviewed the chart and agree that the record reflects my personal performance and is accurate and complete.

## 2024-10-15 PROBLEM — Z90.49 S/P CHOLECYSTECTOMY: Status: ACTIVE | Noted: 2024-10-15

## 2024-10-30 ENCOUNTER — PATIENT MESSAGE (OUTPATIENT)
Dept: FAMILY MEDICINE | Facility: CLINIC | Age: 64
End: 2024-10-30
Payer: COMMERCIAL

## 2024-11-05 RX ORDER — PANTOPRAZOLE SODIUM 40 MG/1
40 TABLET, DELAYED RELEASE ORAL
Qty: 90 TABLET | Refills: 1 | Status: SHIPPED | OUTPATIENT
Start: 2024-11-05

## 2024-11-05 NOTE — TELEPHONE ENCOUNTER
No care due was identified.  [facility] Embedded Care Due Messages. Reference number: [id number].   11/05/2024 10:33:27 AM CST

## 2024-11-05 NOTE — TELEPHONE ENCOUNTER
Refill Routing Note   Medication(s) are not appropriate for processing by [facility] for the following reason(s):        New or recently adjusted medication    [facility] action(s):  Defer               Appointments  past 12m or future 3m with PCP    Date Provider   Last Visit   10/14/2024 Juliano Ramos III, MD   Next Visit   4/14/2025 Juliano Ramos III, MD   ED visits in past 90 days: 0        Note composed:2:35 PM 11/05/2024

## 2025-01-05 ENCOUNTER — OFFICE VISIT (OUTPATIENT)
Dept: URGENT CARE | Facility: CLINIC | Age: 65
End: 2025-01-05
Payer: COMMERCIAL

## 2025-01-05 VITALS
TEMPERATURE: 98 F | DIASTOLIC BLOOD PRESSURE: 71 MMHG | WEIGHT: 173.38 LBS | HEIGHT: 65 IN | SYSTOLIC BLOOD PRESSURE: 117 MMHG | HEART RATE: 83 BPM | OXYGEN SATURATION: 99 % | BODY MASS INDEX: 28.89 KG/M2 | RESPIRATION RATE: 17 BRPM

## 2025-01-05 DIAGNOSIS — J06.9 VIRAL URI WITH COUGH: ICD-10-CM

## 2025-01-05 DIAGNOSIS — R05.9 COUGH, UNSPECIFIED TYPE: Primary | ICD-10-CM

## 2025-01-05 PROCEDURE — 99214 OFFICE O/P EST MOD 30 MIN: CPT | Mod: S$GLB,,, | Performed by: NURSE PRACTITIONER

## 2025-01-05 RX ORDER — BROMPHENIRAMINE MALEATE, PSEUDOEPHEDRINE HYDROCHLORIDE, AND DEXTROMETHORPHAN HYDROBROMIDE 2; 30; 10 MG/5ML; MG/5ML; MG/5ML
5-10 SYRUP ORAL EVERY 6 HOURS PRN
Qty: 118 ML | Refills: 0 | Status: SHIPPED | OUTPATIENT
Start: 2025-01-05 | End: 2025-01-15

## 2025-01-05 RX ORDER — CLOTRIMAZOLE 1 %
CREAM (GRAM) TOPICAL 2 TIMES DAILY
Qty: 28 G | Refills: 0 | Status: SHIPPED | OUTPATIENT
Start: 2025-01-05 | End: 2025-01-08

## 2025-01-05 RX ORDER — AZITHROMYCIN 250 MG/1
TABLET, FILM COATED ORAL
Qty: 6 TABLET | Refills: 0 | Status: SHIPPED | OUTPATIENT
Start: 2025-01-05 | End: 2025-01-10

## 2025-01-05 NOTE — PROGRESS NOTES
Subjective:      Patient ID: Kaia Nelson is a 64 y.o. female.    Vitals:  vitals were not taken for this visit.     Chief Complaint: Cough    Cough  The current episode started in the past 7 days (x6 days). The problem has been unchanged. The cough is Non-productive. Associated symptoms include a rash. Pertinent negatives include no fever or sore throat. She has tried OTC cough suppressant for the symptoms. The treatment provided no relief.       Constitution: Negative for fever.   HENT:  Negative for sinus pain, sinus pressure and sore throat.    Respiratory:  Positive for cough. Negative for sputum production.    Gastrointestinal:  Negative for abdominal pain, nausea and diarrhea.   Skin:  Positive for rash.      Objective:     Past Medical History:   Diagnosis Date    Acute allergic rhinitis due to pollen     Allergy to dust     Chronic cough     Chronic rhinitis     Other pulmonary insufficiency, not elsewhere classified        Past Surgical History:   Procedure Laterality Date    ROBOT-ASSISTED CHOLECYSTECTOMY N/A 6/7/2023    Procedure: ROBOTIC CHOLECYSTECTOMY;  Surgeon: Larry Barba MD;  Location: [facility];  Service: General;  Laterality: N/A;       Family History   Problem Relation Name Age of Onset    Asthma Mother      Emphysema Mother      Hypertension Mother      Diabetes Father      Kidney disease Father      Melanoma Sister      Melanoma Sister Yadira     Heart failure Maternal Grandmother      Hypertension Maternal Grandmother      Diabetes Maternal Grandfather      Heart failure Maternal Grandfather      Cancer Paternal Grandmother          ovarian    Hip fracture Paternal Grandfather         Social History     Socioeconomic History    Marital status:    Occupational History    Occupation: Retired   Tobacco Use    Smoking status: Never    Smokeless tobacco: Never   Substance and Sexual Activity    Alcohol use: No    Drug use: No    Sexual activity: Yes     Partners: Male     Social Drivers  of Health     Financial Resource Strain: Patient Declined (5/1/2023)    Overall Financial Resource Strain (CARDIA)     Difficulty of Paying Living Expenses: Patient declined   Food Insecurity: No Food Insecurity (5/1/2023)    Hunger Vital Sign     Worried About Running Out of Food in the Last Year: Never true     Ran Out of Food in the Last Year: Never true   Transportation Needs: No Transportation Needs (5/1/2023)    PRAPARE - Transportation     Lack of Transportation (Medical): No     Lack of Transportation (Non-Medical): No   Physical Activity: Patient Declined (5/1/2023)    Exercise Vital Sign     Days of Exercise per Week: Patient declined     Minutes of Exercise per Session: Patient declined   Stress: Patient Declined (5/1/2023)    [place] [employer] - Occupational Stress Questionnaire     Feeling of Stress : Patient declined   Housing Stability: Unknown (5/1/2023)    Housing Stability Vital Sign     Unable to Pay for Housing in the Last Year: No     Unstable Housing in the Last Year: No       Current Outpatient Medications   Medication Sig Dispense Refill    azelastine (ASTELIN) 137 mcg (0.1 %) nasal spray INSTILL ONE SPRAY INTO EACH NOSTRIL TWICE DAILY 90 mL 4    azithromycin (Z-ANA) 250 MG tablet Take 2 tablets by mouth on day 1; Take 1 tablet by mouth on days 2-5 6 tablet 0    brompheniramine-pseudoeph-DM (BROMFED DM) 2-30-10 mg/5 mL Syrp Take 5-10 mLs by mouth every 6 (six) hours as needed (cough). 118 mL 0    clotrimazole (LOTRIMIN) 1 % cream Apply topically 2 (two) times daily. for 3 days 28 g 0    dutasteride (AVODART) 0.5 mg capsule Take 0.5 mg by mouth once daily.      fluticasone propionate (FLOVENT HFA) 220 mcg/actuation inhaler 2 puffs twice per day (Patient taking differently: Inhale 2 puffs into the lungs 2 (two) times a day. 2 puffs twice per day) 36 g 3    levothyroxine (SYNTHROID) 88 MCG tablet TAKE 1 TABLET BY MOUTH BEFORE BREAKFAST. 90 tablet 2    minoxidiL (LONITEN)  2.5 MG tablet Take 1.25 mg by mouth every evening.      montelukast (SINGULAIR) 10 mg tablet Take 1 tablet (10 mg total) by mouth once daily. 90 tablet 4    pantoprazole (PROTONIX) 40 MG tablet TAKE 1 TABLET BY MOUTH EVERY DAY 90 tablet 1    spironolactone (ALDACTONE) 100 MG tablet Take 100 mg by mouth once daily.      triamcinolone acetonide 0.1% (KENALOG) 0.1 % cream Aaa bid x 2-3 wks prn rash 15 g 0    vitamin D (VITAMIN D3) 1000 units Tab Take 2,000 Units by mouth once daily.       No current facility-administered medications for this visit.       Review of patient's allergies indicates:   Allergen Reactions    Benzonatate Other (See Comments)     dizziness       Physical Exam   Constitutional: She is oriented to person, place, and time.   HENT:   Head: Normocephalic.   Ears:   Right Ear: Tympanic membrane and ear canal normal.   Left Ear: Tympanic membrane and ear canal normal.   Mouth/Throat: Posterior oropharyngeal erythema present. No oropharyngeal exudate.   Eyes: Conjunctivae are normal.   Cardiovascular: Normal rate.   Pulmonary/Chest: Effort normal and breath sounds normal.   Abdominal: Normal appearance.   Musculoskeletal: Normal range of motion.         General: Normal range of motion.   Neurological: She is alert and oriented to person, place, and time.   Skin: Skin is no rash.   Psychiatric: Her behavior is normal. Mood, judgment and thought content normal.   Nursing note and vitals reviewed.      Assessment:     1. Cough, unspecified type    2. Viral URI with cough        Plan:       Cough, unspecified type    Viral URI with cough    Other orders  -     clotrimazole (LOTRIMIN) 1 % cream; Apply topically 2 (two) times daily. for 3 days  Dispense: 28 g; Refill: 0  -     azithromycin (Z-ANA) 250 MG tablet; Take 2 tablets by mouth on day 1; Take 1 tablet by mouth on days 2-5  Dispense: 6 tablet; Refill: 0  -     brompheniramine-pseudoeph-DM (BROMFED DM) 2-30-10 mg/5 mL Syrp; Take 5-10 mLs by mouth every  6 (six) hours as needed (cough).  Dispense: 118 mL; Refill: 0      Pt presents with approx 6 days cough with minimal relief from OTC cough medication which gave her a rash. She discontinued cough medication (Robitussin) and symptoms resolved. She states ill contact with similar symptoms who was flu and covid (-) prior to her symptoms. Offered and deferred on Covid and Flu swabs. Benign exam without wheezing or further indication to suggest pneumonia. Discussed continued symptomatic treatment provided. Further discussed if symptoms continue Azithromycin was provided to start on approx day 10. Return precautions discussed.

## 2025-04-04 ENCOUNTER — PATIENT MESSAGE (OUTPATIENT)
Dept: FAMILY MEDICINE | Facility: CLINIC | Age: 65
End: 2025-04-04
Payer: COMMERCIAL

## 2025-04-04 DIAGNOSIS — Z13.1 SCREENING FOR DIABETES MELLITUS: ICD-10-CM

## 2025-04-04 DIAGNOSIS — E03.9 HYPOTHYROIDISM, UNSPECIFIED TYPE: Primary | ICD-10-CM

## 2025-04-04 DIAGNOSIS — Z13.220 SCREENING FOR HYPERLIPIDEMIA: ICD-10-CM

## 2025-04-14 ENCOUNTER — OFFICE VISIT (OUTPATIENT)
Dept: FAMILY MEDICINE | Facility: CLINIC | Age: 65
End: 2025-04-14
Payer: COMMERCIAL

## 2025-04-14 VITALS
SYSTOLIC BLOOD PRESSURE: 120 MMHG | TEMPERATURE: 98 F | HEART RATE: 83 BPM | DIASTOLIC BLOOD PRESSURE: 78 MMHG | HEIGHT: 65 IN | WEIGHT: 176.81 LBS | BODY MASS INDEX: 29.46 KG/M2 | OXYGEN SATURATION: 99 %

## 2025-04-14 DIAGNOSIS — D80.1 HYPOGAMMAGLOBULINEMIA: ICD-10-CM

## 2025-04-14 DIAGNOSIS — R73.03 PREDIABETES: ICD-10-CM

## 2025-04-14 DIAGNOSIS — R94.31 ABNORMAL EKG: ICD-10-CM

## 2025-04-14 DIAGNOSIS — R06.02 SOB (SHORTNESS OF BREATH): ICD-10-CM

## 2025-04-14 DIAGNOSIS — J45.20 MILD INTERMITTENT ASTHMA, UNSPECIFIED WHETHER COMPLICATED: ICD-10-CM

## 2025-04-14 DIAGNOSIS — Z12.4 CERVICAL CANCER SCREENING: ICD-10-CM

## 2025-04-14 DIAGNOSIS — E03.9 HYPOTHYROIDISM, UNSPECIFIED TYPE: Primary | ICD-10-CM

## 2025-04-14 DIAGNOSIS — R14.2 BELCHING: ICD-10-CM

## 2025-04-14 DIAGNOSIS — E78.5 HYPERLIPIDEMIA, UNSPECIFIED HYPERLIPIDEMIA TYPE: ICD-10-CM

## 2025-04-14 PROCEDURE — 1160F RVW MEDS BY RX/DR IN RCRD: CPT | Mod: CPTII,S$GLB,, | Performed by: FAMILY MEDICINE

## 2025-04-14 PROCEDURE — 99999 PR PBB SHADOW E&M-EST. PATIENT-LVL V: CPT | Mod: PBBFAC,,, | Performed by: FAMILY MEDICINE

## 2025-04-14 PROCEDURE — 3044F HG A1C LEVEL LT 7.0%: CPT | Mod: CPTII,S$GLB,, | Performed by: FAMILY MEDICINE

## 2025-04-14 PROCEDURE — 99214 OFFICE O/P EST MOD 30 MIN: CPT | Mod: S$GLB,,, | Performed by: FAMILY MEDICINE

## 2025-04-14 PROCEDURE — 3078F DIAST BP <80 MM HG: CPT | Mod: CPTII,S$GLB,, | Performed by: FAMILY MEDICINE

## 2025-04-14 PROCEDURE — 3074F SYST BP LT 130 MM HG: CPT | Mod: CPTII,S$GLB,, | Performed by: FAMILY MEDICINE

## 2025-04-14 PROCEDURE — 1159F MED LIST DOCD IN RCRD: CPT | Mod: CPTII,S$GLB,, | Performed by: FAMILY MEDICINE

## 2025-04-14 PROCEDURE — 3008F BODY MASS INDEX DOCD: CPT | Mod: CPTII,S$GLB,, | Performed by: FAMILY MEDICINE

## 2025-04-14 NOTE — PROGRESS NOTES
SCRIBE #1 NOTE: I, Derek Hutton, am scribing for, and in the presence of,  Juliano Ramos III, MD. I have scribed the entire note.     Subjective:       Patient ID: Kaia Nelson is a 64 y.o. female.    Chief Complaint: Follow-up (6 month )    Ms. Nelson is here for a follow up with her last appointment being here on October 14, 2024. Accompanied by . Not exercising much due to helping her mother after her fall and upcoming surgery. BMI of 29.42. Cardiovascular good. No chest pain. No palpitations. Blood pressure is 120/78. Hyperlipidemia. She wants to hold off on medication for this. States she limits her fat intake and fried food since she had pancreatitis. Cholesterol is 234. HDL is 55. LDL is 160. Prediabetes. A1C is 5.8. Blood glucose is 100. Mild intermittent asthma. On Flovent, but she is getting a new prescription for Asmanex that she has not started yet. She has not had to use her rescue inhaler. Hypothyroidism. On Levothyroxine 88mcg. TSH is 2.53. Hypogammaglobulinemia. Belching. She stopped taking Protonix after 2 weeks since she was having stomach pain and diarrhea. Reports the medication did not help with the belching. States she will also have some belching with exercising consistently. Reports moving after eating will also cause belching. She will have to stay still after she eats, and reports this does not seem to be related to the quantity that she eats. She can eat the same thing 2x in a row, but she will belch one day but not the other. No diarrhea currently or pain. She does get short of breath with exertion. Reports Skittles helps with the belching. Sees Dr. Miles.Abnormal EKG. Pap smear is past due. Mammogram is current. Cologuard is up to date as of August 2023 and due in August 2026. CMP and CBC are okay.     Review of Systems   Constitutional:  Negative for chills and fever.   HENT:  Negative for congestion and sore throat.    Eyes:  Negative for visual disturbance.   Respiratory:   Positive for shortness of breath. Negative for chest tightness.    Cardiovascular:  Negative for chest pain.   Gastrointestinal:  Negative for nausea.        Belching   Endocrine: Negative for polydipsia and polyuria.   Genitourinary:  Negative for dysuria and flank pain.   Musculoskeletal:  Negative for back pain, neck pain and neck stiffness.   Skin:  Negative for rash.   Neurological:  Negative for weakness.   Hematological:  Does not bruise/bleed easily.   Psychiatric/Behavioral:  Negative for behavioral problems.    All other systems reviewed and are negative.      Objective:      Physical examination: Vital signs noted. No acute distress. No carotid bruit. Regular heart rate and rhythm. Lungs clear to auscultation bilaterally. Abdomen bowel sounds positive soft and nontender. Extremities without edema. 2+ pedal pulses.      Assessment:       1. Hypothyroidism, unspecified type    2. Mild intermittent asthma, unspecified whether complicated    3. BMI 29.0-29.9,adult    4. Hypogammaglobulinemia    5. Hyperlipidemia, unspecified hyperlipidemia type    6. Prediabetes    7. Belching    8. SOB (shortness of breath)    9. Cervical cancer screening    10. Abnormal EKG        Plan:       Hypothyroidism, unspecified type    Mild intermittent asthma, unspecified whether complicated    BMI 29.0-29.9,adult    Hypogammaglobulinemia    Hyperlipidemia, unspecified hyperlipidemia type  -     Lipid Panel; Future; Expected date: 07/14/2025    Prediabetes    Belching    SOB (shortness of breath)  -     Echo; Future  -     IN OFFICE EKG 12-LEAD (to Muse)    Cervical cancer screening  -     Ambulatory referral/consult to Obstetrics / Gynecology; Future; Expected date: 04/21/2025    Abnormal EKG  -     Nuclear Stress - Cardiology Interpreted; Future    Refer for Pap smear.  Follow-up with her gastroenterologist Dr. Sims regarding the belching.  Will get an echocardiogram and a stress Myoview due to the abnormal EKG in the  association of the belching with exertion.  In combined shortness of breath.  EKG shows some abnormalities.  Low-fat diet discussed in detail.  Lipids in 3 months and follow-up then.  All care gaps addressed or discussed.     I, Juliano Ramos III, MD, personally performed the services described in this documentation. All medical record entries made by the scribe were at my direction and in my presence. I have reviewed the chart and agree that the record reflects my personal performance and is accurate and complete.

## 2025-04-14 NOTE — PATIENT INSTRUCTIONS
Follow up 3 months with Lipid blood test (fasting 8 hr)     Echo (heart test) - [facility] will call you to schedule       GYN will call you  Treasure Keenan, NP  [address] [facility] [address]  Phone: [phone]  Fax: [phone]

## 2025-04-15 ENCOUNTER — OFFICE VISIT (OUTPATIENT)
Dept: OBSTETRICS AND GYNECOLOGY | Facility: CLINIC | Age: 65
End: 2025-04-15
Payer: COMMERCIAL

## 2025-04-15 VITALS
BODY MASS INDEX: 29.38 KG/M2 | OXYGEN SATURATION: 99 % | HEART RATE: 65 BPM | SYSTOLIC BLOOD PRESSURE: 122 MMHG | WEIGHT: 176.38 LBS | DIASTOLIC BLOOD PRESSURE: 70 MMHG | HEIGHT: 65 IN

## 2025-04-15 DIAGNOSIS — Z12.4 CERVICAL CANCER SCREENING: ICD-10-CM

## 2025-04-15 DIAGNOSIS — Z12.31 BREAST CANCER SCREENING BY MAMMOGRAM: ICD-10-CM

## 2025-04-15 DIAGNOSIS — Z01.419 WELL WOMAN EXAM WITH ROUTINE GYNECOLOGICAL EXAM: Primary | ICD-10-CM

## 2025-04-15 PROCEDURE — 99999 PR PBB SHADOW E&M-EST. PATIENT-LVL IV: CPT | Mod: PBBFAC,,, | Performed by: FAMILY MEDICINE

## 2025-04-15 PROCEDURE — 88175 CYTOPATH C/V AUTO FLUID REDO: CPT | Performed by: FAMILY MEDICINE

## 2025-04-15 PROCEDURE — 3074F SYST BP LT 130 MM HG: CPT | Mod: CPTII,S$GLB,, | Performed by: FAMILY MEDICINE

## 2025-04-15 PROCEDURE — 3044F HG A1C LEVEL LT 7.0%: CPT | Mod: CPTII,S$GLB,, | Performed by: FAMILY MEDICINE

## 2025-04-15 PROCEDURE — 3008F BODY MASS INDEX DOCD: CPT | Mod: CPTII,S$GLB,, | Performed by: FAMILY MEDICINE

## 2025-04-15 PROCEDURE — 3078F DIAST BP <80 MM HG: CPT | Mod: CPTII,S$GLB,, | Performed by: FAMILY MEDICINE

## 2025-04-15 PROCEDURE — 87624 HPV HI-RISK TYP POOLED RSLT: CPT | Performed by: FAMILY MEDICINE

## 2025-04-15 PROCEDURE — 1160F RVW MEDS BY RX/DR IN RCRD: CPT | Mod: CPTII,S$GLB,, | Performed by: FAMILY MEDICINE

## 2025-04-15 PROCEDURE — 99396 PREV VISIT EST AGE 40-64: CPT | Mod: S$GLB,,, | Performed by: FAMILY MEDICINE

## 2025-04-15 PROCEDURE — 1159F MED LIST DOCD IN RCRD: CPT | Mod: CPTII,S$GLB,, | Performed by: FAMILY MEDICINE

## 2025-04-15 NOTE — PROGRESS NOTES
"  HISTORY OF THE PRESENT ILLNESS    04/15/2025  Kaia Nelson is a 64 y.o. here for No chief complaint on file.  .    G'sP's: No obstetric history on file.  LMP: No LMP recorded. Patient is postmenopausal.  Relationship:   Contraception:      PAP'S: no h/o abnormals  PAP: PAP neg / HPV neg / Date: 2020    Last Mammogram: 2024 Results:  negative    HRT: denies   BLEEDING: denies    Last colonoscopy: 2023     LABS & RADS   Lab Results   Component Value Date    WBC 8.4 2025    HGB 13.0 2025    HCT 40.6 2025     2025    MCV 88.1 2025      Lab Results   Component Value Date    TSH 2.53 2025      No results found for: "LABURIN"  Lab Results   Component Value Date    HEPCAB 0.1 2020        GYNECOLOGIC HISTORY    Age of Menarche:13  Age at first pregnancy:    Age at first live birth:   Age at Menopause:49       OBSTETRIC HISTORY  OB History    Para Term  AB Living     0      SAB IAB Ectopic Multiple Live Births              PAST MEDICAL HISTORY  -------------------------------------    Acute allergic rhinitis due to pollen    Allergy to dust    Chronic cough    Chronic rhinitis    Other pulmonary insufficiency, not elsewhere classified         PAST SURGICAL HISTORY  ----------------------------    Robot-assisted cholecystectomy    Procedure: ROBOTIC CHOLECYSTECTOMY;  Surgeon: Larry Barba MD;  Location: [facility];  Service: General;  Laterality: N/A;         ALLERGIES  Review of patient's allergies indicates:   Allergen Reactions    Benzonatate Other (See Comments)     dizziness       MEDICATIONS  Current Outpatient Medications   Medication Instructions    azelastine (ASTELIN) 137 mcg (0.1 %) nasal spray INSTILL ONE SPRAY INTO EACH NOSTRIL TWICE DAILY    clotrimazole (LOTRIMIN) 1 % cream Topical (Top), 2 times daily    dutasteride (AVODART) 0.5 mg, Daily    fluticasone propionate (FLOVENT HFA) 220 mcg/actuation inhaler 2 " "puffs twice per day    levothyroxine (SYNTHROID) 88 mcg, Oral, Before breakfast    minoxidiL (LONITEN) 1.25 mg, Nightly    montelukast (SINGULAIR) 10 mg, Oral, Daily    spironolactone (ALDACTONE) 100 mg, Daily    triamcinolone acetonide 0.1% (KENALOG) 0.1 % cream Aaa bid x 2-3 wks prn rash    vitamin D (VITAMIN D3) 2,000 Units, Daily       SOCIAL HISTORY  Social History[1]   Lives with:   Domestic Violence: no  Occupation: retired from [employer]      FAMILY HISTORY  BLEEDING or  CLOTTING DISORDERS: none  BREAST CA: none  UTERINE CA: none  OVARIAN CA: PGM  COLON CA: none    REVIEW OF SYSTEMS  Review of Systems   Constitutional:  Negative for activity change, appetite change and fever.   Respiratory:  Negative for cough and shortness of breath.    Genitourinary:  Negative for dysuria, flank pain, frequency, pelvic pain, urgency and urinary incontinence.   Psychiatric/Behavioral:  Negative for depression.      --------------------------------------------------------------------------------------------------------------    PHYSICAL EXAM  VITALS:  Vitals:    04/15/25 0856   BP: 122/70   BP Location: Left arm   Patient Position: Sitting   Pulse: 65   SpO2: 99%   Weight: 80 kg (176 lb 5.9 oz)   Height: 5' 5" (1.651 m)     Exam conducted with a chaperone present.     Physical Exam:   Constitutional: She is oriented to person, place, and time. She appears well-developed and well-nourished.    HENT:   Head: Normocephalic.    Eyes: Pupils are equal, round, and reactive to light. Conjunctivae and EOM are normal.      Pulmonary/Chest: Effort normal.          Genitourinary:    Inguinal canal, vagina, uterus, right adnexa, left adnexa and rectum normal.      Pelvic exam was performed with patient in the lithotomy position.   The external female genitalia was normal.   Genitalia hair distrobution normal .   Cervix is normal. Vagina was moist.   pap smear completedUterus consistancy normal and Uerus contour normal       "      Musculoskeletal: Normal range of motion and moves all extremeties.       Neurological: She is alert and oriented to person, place, and time.    Skin: Skin is warm and dry.    Psychiatric: She has a normal mood and affect.          ASSESSMENT AND PLAN:  Kaia Nelson 64 y.o.   Diagnoses and all orders for this visit:    Well woman exam with routine gynecological exam / Cervical cancer screening  -     Ambulatory referral/consult to Obstetrics / Gynecology  -     Liquid-Based Pap Smear, Screening    Breast cancer screening by mammogram  -     Mammo Digital Screening Bilat w/ Haile (XPD); Future      Cervical Cancer screening: f/u results of PAP / HPV --> if both negative then next screen in 5 yrs  HPV Vaccine: n/a (>45)    Mammogram: up to date    From a gynecologic standpoint the patient is currently doing well without complaints.     Patient was counseled today on :  Pap guidelines  Recommend periodic pelvic exams with visual inspection and palpation  mammograms starting annually at age 40  Encouraged self breast awareness; RTC for breast concerns  Colonoscopy after the age of 50  Dexa Bone Scan and calcium and vitamin D supplementation in menopause and to see her PCP for other health maintenance.     RTC for periodic GYN exam, sooner prn      Treasure T Griffing     Follow up if symptoms worsen or fail to improve.   Future Appointments   Date Time Provider Department Center   4/21/2025  6:30 AM CV [facility] OP ECHO 2 [facility] OPCARD [id number]   4/21/2025  6:50 AM [facility] NM1 [facility] NUMD [id number]   4/21/2025  7[id number]   4/21/2025  7:30 AM [facility] NM1 [facility] NUMD [id number]   7/17/2025  7:50 AM Juliano Ramos III, MD [facility] FAMMED O at [facility] MOB   7/31/2025  7:45 AM [facility] MAMMO1 [facility] MAMMO [place]         Tests to Keep You Healthy    Mammogram: Met on 7/30/2024  Colon Cancer Screening: Met on 8/16/2023  Cervical Cancer Screening: ORDERED              [1]  Social History  Tobacco Use    Smoking  status: Never    Smokeless tobacco: Never   Substance Use Topics    Alcohol use: No    Drug use: No

## 2025-04-16 LAB
OHS QRS DURATION: 80 MS
OHS QTC CALCULATION: 392 MS

## 2025-04-17 ENCOUNTER — TELEPHONE (OUTPATIENT)
Dept: CARDIOLOGY | Facility: HOSPITAL | Age: 65
End: 2025-04-17

## 2025-04-17 NOTE — TELEPHONE ENCOUNTER
Patient advised, test will be at [facility] ([address]  Will need to register on the first floor at the main entrance.  Patient advised that arrival time is 06:40.  Patient advised that they may be here about 3.5-4 hours, and may want to bring something to occupy their time, as there will be periods of waiting.    Patient advised, they may take their medications prior to testing if you need to. Advised if food is needed to take medications, please keep it light, like toast and juice.    Patient advised to avoid all caffeine 12 hours prior to testing.  This includes chocolate, tea and decaf coffee.    Will provide peanut butter crackers for a snack after stress test.  If patient would prefer something else, please bring a snack from home.    Wear comfortable clothing with tennis shoes.   No lotions, oils, or powders to the upper chest area. May wear deodorant.    No metal jewelry, buttons, or zippers to the upper body.  Patient verbalizes understanding of instructions.

## 2025-04-21 ENCOUNTER — HOSPITAL ENCOUNTER (OUTPATIENT)
Dept: CARDIOLOGY | Facility: HOSPITAL | Age: 65
Discharge: HOME | End: 2025-04-21
Attending: FAMILY MEDICINE
Payer: COMMERCIAL

## 2025-04-21 ENCOUNTER — HOSPITAL ENCOUNTER (OUTPATIENT)
Dept: RADIOLOGY | Facility: HOSPITAL | Age: 65
Discharge: HOME | End: 2025-04-21
Attending: FAMILY MEDICINE
Payer: COMMERCIAL

## 2025-04-21 ENCOUNTER — RESULTS FOLLOW-UP (OUTPATIENT)
Dept: FAMILY MEDICINE | Facility: CLINIC | Age: 65
End: 2025-04-21

## 2025-04-21 VITALS — WEIGHT: 176 LBS | HEIGHT: 65 IN | BODY MASS INDEX: 29.32 KG/M2

## 2025-04-21 DIAGNOSIS — R94.31 ABNORMAL EKG: ICD-10-CM

## 2025-04-21 DIAGNOSIS — R06.02 SOB (SHORTNESS OF BREATH): ICD-10-CM

## 2025-04-21 LAB
CV STRESS BASE HR: 81 BPM
DIASTOLIC BLOOD PRESSURE: 80 MMHG
EJECTION FRACTION- HIGH: 65 %
END DIASTOLIC INDEX-HIGH: 153 ML/M2
END DIASTOLIC INDEX-LOW: 93 ML/M2
END SYSTOLIC INDEX-HIGH: 71 ML/M2
END SYSTOLIC INDEX-LOW: 31 ML/M2
NUC REST DIASTOLIC VOLUME INDEX: 75
NUC REST EJECTION FRACTION: 76
NUC REST SYSTOLIC VOLUME INDEX: 18
NUC STRESS DIASTOLIC VOLUME INDEX: 59
NUC STRESS EJECTION FRACTION: 71 %
NUC STRESS SYSTOLIC VOLUME INDEX: 17
OHS CV CPX 1 MINUTE RECOVERY HEART RATE: 110 BPM
OHS CV CPX 85 PERCENT MAX PREDICTED HEART RATE MALE: 134
OHS CV CPX MAX PREDICTED HEART RATE: 158
OHS CV CPX PATIENT IS FEMALE: 1
OHS CV CPX PATIENT IS MALE: 0
OHS CV CPX PEAK DIASTOLIC BLOOD PRESSURE: 93 MMHG
OHS CV CPX PEAK HEAR RATE: 141 BPM
OHS CV CPX PEAK RATE PRESSURE PRODUCT: NORMAL
OHS CV CPX PEAK SYSTOLIC BLOOD PRESSURE: 194 MMHG
OHS CV CPX PERCENT MAX PREDICTED HEART RATE ACHIEVED: 93
OHS CV CPX RATE PRESSURE PRODUCT PRESENTING: NORMAL
OHS CV INITIAL DOSE: 12.3 MCG/KG/MIN
OHS CV PEAK DOSE: 25.9 MCG/KG/MIN
RETIRED EF AND QEF - SEE NOTES: 53 %
STRESS ECHO POST EXERCISE DUR MIN: 4 MINUTES
STRESS ECHO POST EXERCISE DUR SEC: 14 SECONDS
SYSTOLIC BLOOD PRESSURE: 131 MMHG

## 2025-04-21 PROCEDURE — 93018 CV STRESS TEST I&R ONLY: CPT | Mod: ,,, | Performed by: INTERNAL MEDICINE

## 2025-04-21 PROCEDURE — 78452 HT MUSCLE IMAGE SPECT MULT: CPT | Mod: 26,,, | Performed by: INTERNAL MEDICINE

## 2025-04-21 PROCEDURE — 93306 TTE W/DOPPLER COMPLETE: CPT

## 2025-04-21 PROCEDURE — 78452 HT MUSCLE IMAGE SPECT MULT: CPT

## 2025-04-21 PROCEDURE — 93306 TTE W/DOPPLER COMPLETE: CPT | Mod: 26,,, | Performed by: INTERNAL MEDICINE

## 2025-04-21 PROCEDURE — A9502 TC99M TETROFOSMIN: HCPCS | Performed by: FAMILY MEDICINE

## 2025-04-21 PROCEDURE — 93016 CV STRESS TEST SUPVJ ONLY: CPT | Mod: ,,, | Performed by: INTERNAL MEDICINE

## 2025-04-21 RX ADMIN — TETROFOSMIN 12.3 MILLICURIE: 1.38 INJECTION, POWDER, LYOPHILIZED, FOR SOLUTION INTRAVENOUS at 06:04

## 2025-04-21 RX ADMIN — TETROFOSMIN 25.9 MILLICURIE: 1.38 INJECTION, POWDER, LYOPHILIZED, FOR SOLUTION INTRAVENOUS at 08:04

## 2025-04-22 LAB
AORTIC ROOT ANNULUS: 3.2 CM
AORTIC VALVE CUSP SEPERATION: 1.8 CM
APICAL FOUR CHAMBER EJECTION FRACTION: 66 %
AV INDEX (PROSTH): 0.85
AV MEAN GRADIENT: 4 MMHG
AV PEAK GRADIENT: 7 MMHG
AV VALVE AREA BY VELOCITY RATIO: 2.7 CM²
AV VALVE AREA: 2.7 CM²
AV VELOCITY RATIO: 0.85
BSA FOR ECHO PROCEDURE: 1.91 M2
CV ECHO LV RWT: 0.43 CM
DOP CALC AO PEAK VEL: 1.3 M/S
DOP CALC AO VTI: 28.4 CM
DOP CALC LVOT AREA: 3.1 CM2
DOP CALC LVOT DIAMETER: 2 CM
DOP CALC LVOT PEAK VEL: 1.1 M/S
DOP CALC LVOT STROKE VOLUME: 76 CM3
DOP CALCLVOT PEAK VEL VTI: 24.2 CM
E WAVE DECELERATION TIME: 216 MSEC
E/A RATIO: 1.12
E/E' RATIO: 8 M/S
ECHO LV POSTERIOR WALL: 0.9 CM (ref 0.6–1.1)
FRACTIONAL SHORTENING: 38.1 % (ref 28–44)
INTERVENTRICULAR SEPTUM: 1 CM (ref 0.6–1.1)
IVRT: 100 MSEC
LEFT ATRIUM SIZE: 3.6 CM
LEFT INTERNAL DIMENSION IN SYSTOLE: 2.6 CM (ref 2.1–4)
LEFT VENTRICLE DIASTOLIC VOLUME INDEX: 41.18 ML/M2
LEFT VENTRICLE DIASTOLIC VOLUME: 77 ML
LEFT VENTRICLE END DIASTOLIC VOLUME APICAL 4 CHAMBER: 64.5 ML
LEFT VENTRICLE MASS INDEX: 68.3 G/M2
LEFT VENTRICLE SYSTOLIC VOLUME INDEX: 12.8 ML/M2
LEFT VENTRICLE SYSTOLIC VOLUME: 24 ML
LEFT VENTRICULAR INTERNAL DIMENSION IN DIASTOLE: 4.2 CM (ref 3.5–6)
LEFT VENTRICULAR MASS: 127.8 G
LV LATERAL E/E' RATIO: 5.9 M/S
LV SEPTAL E/E' RATIO: 10.4 M/S
LVED V (TEICH): 77.3 ML
LVES V (TEICH): 23.7 ML
LVOT MG: 3 MMHG
LVOT MV: 0.71 CM/S
MV PEAK A VEL: 0.74 M/S
MV PEAK E VEL: 0.83 M/S
MV STENOSIS PRESSURE HALF TIME: 56 MS
MV VALVE AREA P 1/2 METHOD: 3.93 CM2
OHS CV RV/LV RATIO: 0.38 CM
RA PRESSURE ESTIMATED: 3 MMHG
RIGHT VENTRICLE DIASTOLIC BASEL DIMENSION: 1.6 CM
RIGHT VENTRICULAR END-DIASTOLIC DIMENSION: 1.59 CM
TDI LATERAL: 0.14 M/S
TDI SEPTAL: 0.08 M/S
TDI: 0.11 M/S
Z-SCORE OF LEFT VENTRICULAR DIMENSION IN END DIASTOLE: -2.16
Z-SCORE OF LEFT VENTRICULAR DIMENSION IN END SYSTOLE: -1.68

## 2025-05-12 ENCOUNTER — RESULTS FOLLOW-UP (OUTPATIENT)
Dept: OBSTETRICS AND GYNECOLOGY | Facility: CLINIC | Age: 65
End: 2025-05-12

## 2025-05-12 NOTE — PROGRESS NOTES
Pap smear shows no changes/malignancy,  repeat pap smear in 3 years.  Periodic pelvic exams with visual inspection and palpation is recommended every 1-2 years.

## 2025-05-13 ENCOUNTER — TELEPHONE (OUTPATIENT)
Dept: OBSTETRICS AND GYNECOLOGY | Facility: CLINIC | Age: 65
End: 2025-05-13
Payer: COMMERCIAL

## 2025-05-13 NOTE — TELEPHONE ENCOUNTER
Can we call to see if we can get HPV result for this patient? The report only has PAP results. I ordered both

## 2025-06-28 NOTE — TELEPHONE ENCOUNTER
No care due was identified.  [facility] Embedded Care Due Messages. Reference number: [id number].   6/28/2025 8:26:17 AM CDT

## 2025-06-29 RX ORDER — LEVOTHYROXINE SODIUM 88 UG/1
88 TABLET ORAL
Qty: 90 TABLET | Refills: 3 | Status: SHIPPED | OUTPATIENT
Start: 2025-06-29

## 2025-06-29 NOTE — TELEPHONE ENCOUNTER
Refill Decision Note   Kaia Nelson  is requesting a refill authorization.  Brief Assessment and Rationale for Refill:  Approve     Medication Therapy Plan:         Comments:     Note composed:3:45 PM 06/29/2025

## 2025-07-17 ENCOUNTER — OFFICE VISIT (OUTPATIENT)
Dept: FAMILY MEDICINE | Facility: CLINIC | Age: 65
End: 2025-07-17
Payer: COMMERCIAL

## 2025-07-17 VITALS
OXYGEN SATURATION: 99 % | HEIGHT: 65 IN | DIASTOLIC BLOOD PRESSURE: 70 MMHG | SYSTOLIC BLOOD PRESSURE: 102 MMHG | TEMPERATURE: 98 F | WEIGHT: 179.44 LBS | BODY MASS INDEX: 29.9 KG/M2 | HEART RATE: 59 BPM

## 2025-07-17 DIAGNOSIS — J45.20 MILD INTERMITTENT ASTHMA, UNSPECIFIED WHETHER COMPLICATED: ICD-10-CM

## 2025-07-17 DIAGNOSIS — E03.9 HYPOTHYROIDISM, UNSPECIFIED TYPE: Primary | ICD-10-CM

## 2025-07-17 DIAGNOSIS — L64.9 ANDROGENIC ALOPECIA: Chronic | ICD-10-CM

## 2025-07-17 PROCEDURE — 99999 PR PBB SHADOW E&M-EST. PATIENT-LVL IV: CPT | Mod: PBBFAC,,, | Performed by: FAMILY MEDICINE

## 2025-07-17 PROCEDURE — 3044F HG A1C LEVEL LT 7.0%: CPT | Mod: CPTII,S$GLB,, | Performed by: FAMILY MEDICINE

## 2025-07-17 PROCEDURE — 1159F MED LIST DOCD IN RCRD: CPT | Mod: CPTII,S$GLB,, | Performed by: FAMILY MEDICINE

## 2025-07-17 PROCEDURE — 3074F SYST BP LT 130 MM HG: CPT | Mod: CPTII,S$GLB,, | Performed by: FAMILY MEDICINE

## 2025-07-17 PROCEDURE — 99214 OFFICE O/P EST MOD 30 MIN: CPT | Mod: S$GLB,,, | Performed by: FAMILY MEDICINE

## 2025-07-17 PROCEDURE — 3008F BODY MASS INDEX DOCD: CPT | Mod: CPTII,S$GLB,, | Performed by: FAMILY MEDICINE

## 2025-07-17 PROCEDURE — 1160F RVW MEDS BY RX/DR IN RCRD: CPT | Mod: CPTII,S$GLB,, | Performed by: FAMILY MEDICINE

## 2025-07-17 PROCEDURE — 3078F DIAST BP <80 MM HG: CPT | Mod: CPTII,S$GLB,, | Performed by: FAMILY MEDICINE

## 2025-07-17 NOTE — PROGRESS NOTES
SCRIBE #1 NOTE: I, Derek Hutton, am scribing for, and in the presence of,  Juliano Ramos III, MD. I have scribed the entire note.     Subjective:       Patient ID: Kaia Nelson is a 64 y.o. female.    Chief Complaint: Follow-up (3 month )    Ms. Nelson is here for a follow up with her last appointment being here on April 14, 2025. Exercising some. BMI of 29.86. Cardiovascular good. No chest pain. No palpitations. Blood pressure is 102/70. Mild intermittent asthma. Switched from Flovent to Asmanex due to insurance. She has not needed the Albuterol. Hypothyroidism. TSH is 2.53. Cholesterol is 218, was 234. Triglycerides are 85. HDL is 50. LDL is 149 (actually 140 when adjusted). Stress test was negative. No angina. Mammogram is scheduled for the end of July 2025.  She is on medications for androgenic alopecia.  Including Aldactone and Avodart.  As well as minoxidil.    Review of Systems   Constitutional:  Negative for chills and fever.   HENT:  Negative for congestion and sore throat.    Eyes:  Negative for visual disturbance.   Respiratory:  Negative for chest tightness and shortness of breath.    Cardiovascular:  Negative for chest pain.   Gastrointestinal:  Negative for nausea.   Endocrine: Negative for polydipsia and polyuria.   Genitourinary:  Negative for dysuria and flank pain.   Musculoskeletal:  Negative for back pain, neck pain and neck stiffness.   Skin:  Negative for rash.   Neurological:  Negative for weakness.   Hematological:  Does not bruise/bleed easily.   Psychiatric/Behavioral:  Negative for behavioral problems.    All other systems reviewed and are negative.      Objective:      Physical examination: Vital signs noted. No acute distress. No carotid bruit. Regular heart rate and rhythm. Lungs clear to auscultation bilaterally. Abdomen bowel sounds positive soft and nontender. Extremities without edema. 2+ pedal pulses.      Assessment:       1. Hypothyroidism, unspecified type    2. Mild  intermittent asthma, unspecified whether complicated    3. BMI 29.0-29.9,adult    4. Androgenic alopecia        Plan:       Hypothyroidism, unspecified type  -     Lipid Panel; Future; Expected date: 01/17/2026  -     Comprehensive Metabolic Panel; Future; Expected date: 01/17/2026  -     TSH; Future; Expected date: 01/17/2026    Mild intermittent asthma, unspecified whether complicated    BMI 29.0-29.9,adult    Androgenic alopecia    Continue current medications.  Low-fat diet discussed.  Exercise program.  Follow-up in 6 months with lipids CMP and TSH.  Mammogram this month.  Hypothyroidism is controlled.  Asthma under good control.  All care gaps addressed or discussed.     I, Juliano Ramos III, MD, personally performed the services described in this documentation. All medical record entries made by the scribe were at my direction and in my presence. I have reviewed the chart and agree that the record reflects my personal performance and is accurate and complete.

## 2025-07-31 ENCOUNTER — HOSPITAL ENCOUNTER (OUTPATIENT)
Dept: RADIOLOGY | Facility: HOSPITAL | Age: 65
Discharge: HOME | End: 2025-07-31
Attending: FAMILY MEDICINE
Payer: COMMERCIAL

## 2025-07-31 DIAGNOSIS — Z12.31 BREAST CANCER SCREENING BY MAMMOGRAM: ICD-10-CM

## 2025-07-31 PROCEDURE — 77067 SCR MAMMO BI INCL CAD: CPT | Mod: TC

## 2025-07-31 PROCEDURE — 77067 SCR MAMMO BI INCL CAD: CPT | Mod: 26,,, | Performed by: RADIOLOGY

## 2025-07-31 PROCEDURE — 77063 BREAST TOMOSYNTHESIS BI: CPT | Mod: 26,,, | Performed by: RADIOLOGY

## (undated) DEVICE — BLADE SURG CARBON STEEL SZ11

## (undated) DEVICE — TOWEL OR DISP STRL BLUE 4/PK

## (undated) DEVICE — CANNULA SEAL 12MM

## (undated) DEVICE — DRAPE ARM DAVINCI XI

## (undated) DEVICE — IRRIGATOR ENDOWRIST XI SUCTION

## (undated) DEVICE — ELECTRODE REM PLYHSV RETURN 9

## (undated) DEVICE — SOL ELECTROLUBE ANTI-STIC

## (undated) DEVICE — COVER TABLE 44X90 STERILE

## (undated) DEVICE — SOL CLEARIFY VISUALIZATION LAP

## (undated) DEVICE — PACK CUSTOM ENDO CHOLO SLI

## (undated) DEVICE — APPLICATOR CHLORAPREP ORN 26ML

## (undated) DEVICE — SLEEVE SCD EXPRESS KNEE MEDIUM

## (undated) DEVICE — SET TUBE PNEUMOCLEAR SE HI FLO

## (undated) DEVICE — SUT EASE CROSSBOW CLSR SYS

## (undated) DEVICE — SEAL UNIVERSAL 5MM-8MM XI

## (undated) DEVICE — GLOVE SURG ULTRA TOUCH 6

## (undated) DEVICE — LINER SUCTION 3000CC

## (undated) DEVICE — SUT MONOCRYL 4-0 PS-2

## (undated) DEVICE — UNDERGLOVES BIOGEL PI SZ 6 LF

## (undated) DEVICE — STRAP OR TABLE 5IN X 72IN

## (undated) DEVICE — GLOVE BIOGEL PI MICRO INDIC 7

## (undated) DEVICE — CLIP HEMO-LOK MLX LARGE LF

## (undated) DEVICE — DRAPE ABDOMINAL TIBURON 14X11

## (undated) DEVICE — SOL IRR NACL .9% 3000ML

## (undated) DEVICE — CANNULA REDUCER 12-8MM

## (undated) DEVICE — SUT 0 VICRYL / UR6 (J603)

## (undated) DEVICE — DRAPE COLUMN DAVINCI XI

## (undated) DEVICE — GOWN POLY REINF BRTH SLV LG

## (undated) DEVICE — BAG TISS RETRV MONARCH 10MM

## (undated) DEVICE — NDL SURE-SNAP HYPO SAF 21GX1.5

## (undated) DEVICE — COVER TIP CURVED SCISSORS XI

## (undated) DEVICE — OBTURATOR BLADELESS 8MM XI CLR

## (undated) DEVICE — GOWN POLY REINF X-LONG XL

## (undated) DEVICE — GOWN POLY REINF BRTH SLV XL

## (undated) DEVICE — ADHESIVE DERMABOND ADVANCED

## (undated) DEVICE — GLOVE SURG ULTRA TOUCH 7.5

## (undated) DEVICE — TROCAR ENDO Z THREAD KII 5X100

## (undated) DEVICE — SYR 30CC LUER LOCK